# Patient Record
Sex: MALE | Race: ASIAN | NOT HISPANIC OR LATINO | Employment: OTHER | URBAN - METROPOLITAN AREA
[De-identification: names, ages, dates, MRNs, and addresses within clinical notes are randomized per-mention and may not be internally consistent; named-entity substitution may affect disease eponyms.]

---

## 2018-02-20 ENCOUNTER — AMBULATORY - HEALTHEAST (OUTPATIENT)
Dept: CARDIOLOGY | Facility: CLINIC | Age: 63
End: 2018-02-20

## 2018-02-22 ENCOUNTER — RECORDS - HEALTHEAST (OUTPATIENT)
Dept: ADMINISTRATIVE | Facility: OTHER | Age: 63
End: 2018-02-22

## 2018-03-15 ENCOUNTER — RECORDS - HEALTHEAST (OUTPATIENT)
Dept: ADMINISTRATIVE | Facility: OTHER | Age: 63
End: 2018-03-15

## 2018-03-21 ENCOUNTER — OFFICE VISIT - HEALTHEAST (OUTPATIENT)
Dept: CARDIOLOGY | Facility: CLINIC | Age: 63
End: 2018-03-21

## 2018-03-21 ENCOUNTER — AMBULATORY - HEALTHEAST (OUTPATIENT)
Dept: CARDIOLOGY | Facility: CLINIC | Age: 63
End: 2018-03-21

## 2018-03-21 DIAGNOSIS — I48.91 ATRIAL FIBRILLATION WITH RAPID VENTRICULAR RESPONSE (H): ICD-10-CM

## 2018-03-21 DIAGNOSIS — N17.9 AKI (ACUTE KIDNEY INJURY) (H): ICD-10-CM

## 2018-03-21 DIAGNOSIS — I50.20 SYSTOLIC CONGESTIVE HEART FAILURE, UNSPECIFIED CONGESTIVE HEART FAILURE CHRONICITY: ICD-10-CM

## 2018-03-21 DIAGNOSIS — N18.30 CHRONIC KIDNEY DISEASE (CKD), STAGE III (MODERATE) (H): ICD-10-CM

## 2018-03-21 ASSESSMENT — MIFFLIN-ST. JEOR: SCORE: 1387.28

## 2018-03-22 LAB
ATRIAL RATE - MUSE: 88 BPM
DIASTOLIC BLOOD PRESSURE - MUSE: NORMAL MMHG
INTERPRETATION ECG - MUSE: NORMAL
P AXIS - MUSE: 54 DEGREES
PR INTERVAL - MUSE: 166 MS
QRS DURATION - MUSE: 88 MS
QT - MUSE: 402 MS
QTC - MUSE: 486 MS
R AXIS - MUSE: -9 DEGREES
SYSTOLIC BLOOD PRESSURE - MUSE: NORMAL MMHG
T AXIS - MUSE: 37 DEGREES
VENTRICULAR RATE- MUSE: 88 BPM

## 2018-04-09 ENCOUNTER — HOSPITAL ENCOUNTER (OUTPATIENT)
Dept: CARDIOLOGY | Facility: HOSPITAL | Age: 63
Discharge: HOME | End: 2018-04-09

## 2018-04-09 DIAGNOSIS — I50.20 SYSTOLIC CONGESTIVE HEART FAILURE, UNSPECIFIED CONGESTIVE HEART FAILURE CHRONICITY: ICD-10-CM

## 2018-04-09 ASSESSMENT — MIFFLIN-ST. JEOR: SCORE: 1387.28

## 2018-04-10 LAB
BSA FOR ECHO PROCEDURE: 1.78 M2
CV BLOOD PRESSURE: NORMAL MMHG
CV ECHO HEIGHT: 63 IN
CV ECHO WEIGHT: 157 LBS
EJECTION FRACTION: 17 % (ref 55–75)
LEFT VENTRICLE DIASTOLIC VOLUME INDEX: 71.3 CM3/M2 (ref 34–74)
LEFT VENTRICLE DIASTOLIC VOLUME: 127 CM3 (ref 62–150)
LEFT VENTRICLE HEART RATE: 132 BPM
LEFT VENTRICLE SYSTOLIC VOLUME INDEX: 59.6 CM3/M2 (ref 11–31)
LEFT VENTRICLE SYSTOLIC VOLUME: 106 CM3 (ref 21–61)
NUC REST DIASTOLIC VOLUME INDEX: 2512 LBS
NUC REST SYSTOLIC VOLUME INDEX: 63 IN

## 2018-04-12 ENCOUNTER — COMMUNICATION - HEALTHEAST (OUTPATIENT)
Dept: CARDIOLOGY | Facility: CLINIC | Age: 63
End: 2018-04-12

## 2018-04-12 ENCOUNTER — OFFICE VISIT - HEALTHEAST (OUTPATIENT)
Dept: CARDIOLOGY | Facility: CLINIC | Age: 63
End: 2018-04-12

## 2018-04-12 DIAGNOSIS — I48.91 ATRIAL FIBRILLATION WITH RAPID VENTRICULAR RESPONSE (H): ICD-10-CM

## 2018-04-12 DIAGNOSIS — N28.9 KIDNEY DISEASE: ICD-10-CM

## 2018-04-12 DIAGNOSIS — I42.9 CARDIOMYOPATHY (H): ICD-10-CM

## 2018-04-12 LAB
ANION GAP SERPL CALCULATED.3IONS-SCNC: 8 MMOL/L (ref 5–18)
ATRIAL RATE - MUSE: 344 BPM
BUN SERPL-MCNC: 29 MG/DL (ref 8–22)
CALCIUM SERPL-MCNC: 9.5 MG/DL (ref 8.5–10.5)
CHLORIDE BLD-SCNC: 109 MMOL/L (ref 98–107)
CO2 SERPL-SCNC: 23 MMOL/L (ref 22–31)
CREAT SERPL-MCNC: 2.17 MG/DL (ref 0.7–1.3)
DIASTOLIC BLOOD PRESSURE - MUSE: NORMAL MMHG
ERYTHROCYTE [DISTWIDTH] IN BLOOD BY AUTOMATED COUNT: 13.9 % (ref 11–14.5)
GFR SERPL CREATININE-BSD FRML MDRD: 31 ML/MIN/1.73M2
GLUCOSE BLD-MCNC: 92 MG/DL (ref 70–125)
HCT VFR BLD AUTO: 48.4 % (ref 40–54)
HGB BLD-MCNC: 16 G/DL (ref 14–18)
INR PPP: 1.11 (ref 0.9–1.1)
INTERPRETATION ECG - MUSE: NORMAL
MCH RBC QN AUTO: 31.3 PG (ref 27–34)
MCHC RBC AUTO-ENTMCNC: 33.1 G/DL (ref 32–36)
MCV RBC AUTO: 95 FL (ref 80–100)
P AXIS - MUSE: NORMAL DEGREES
PLATELET # BLD AUTO: 216 THOU/UL (ref 140–440)
PMV BLD AUTO: 11.5 FL (ref 8.5–12.5)
POTASSIUM BLD-SCNC: 4.1 MMOL/L (ref 3.5–5)
PR INTERVAL - MUSE: NORMAL MS
QRS DURATION - MUSE: 94 MS
QT - MUSE: 354 MS
QTC - MUSE: 520 MS
R AXIS - MUSE: 5 DEGREES
RBC # BLD AUTO: 5.12 MILL/UL (ref 4.4–6.2)
SODIUM SERPL-SCNC: 140 MMOL/L (ref 136–145)
SYSTOLIC BLOOD PRESSURE - MUSE: NORMAL MMHG
T AXIS - MUSE: 33 DEGREES
TROPONIN I SERPL-MCNC: 0.08 NG/ML (ref 0–0.29)
VENTRICULAR RATE- MUSE: 130 BPM
WBC: 7.7 THOU/UL (ref 4–11)

## 2018-04-12 ASSESSMENT — MIFFLIN-ST. JEOR
SCORE: 1394.08
SCORE: 1372.42

## 2018-04-13 ASSESSMENT — MIFFLIN-ST. JEOR
SCORE: 1392.26

## 2018-04-14 ASSESSMENT — MIFFLIN-ST. JEOR
SCORE: 1372

## 2018-04-15 ASSESSMENT — MIFFLIN-ST. JEOR
SCORE: 1377

## 2018-04-16 ASSESSMENT — MIFFLIN-ST. JEOR
SCORE: 1349

## 2018-04-17 ENCOUNTER — ANESTHESIA - HEALTHEAST (OUTPATIENT)
Dept: INTENSIVE CARE | Facility: CLINIC | Age: 63
End: 2018-04-17

## 2018-04-17 ENCOUNTER — RECORDS - HEALTHEAST (OUTPATIENT)
Dept: INTENSIVE CARE | Facility: CLINIC | Age: 63
End: 2018-04-17

## 2018-04-17 ASSESSMENT — MIFFLIN-ST. JEOR
SCORE: 1343.27

## 2018-04-18 ENCOUNTER — SURGERY - HEALTHEAST (OUTPATIENT)
Dept: CARDIOLOGY | Facility: CLINIC | Age: 63
End: 2018-04-18

## 2018-04-18 ASSESSMENT — MIFFLIN-ST. JEOR
SCORE: 1345.09

## 2018-04-19 ASSESSMENT — MIFFLIN-ST. JEOR
SCORE: 1356.88

## 2018-04-20 ENCOUNTER — SURGERY - HEALTHEAST (OUTPATIENT)
Dept: CARDIOLOGY | Facility: CLINIC | Age: 63
End: 2018-04-20

## 2018-04-20 ENCOUNTER — AMBULATORY - HEALTHEAST (OUTPATIENT)
Dept: CARDIOLOGY | Facility: CLINIC | Age: 63
End: 2018-04-20

## 2018-04-20 ENCOUNTER — RECORDS - HEALTHEAST (OUTPATIENT)
Dept: ADMINISTRATIVE | Facility: OTHER | Age: 63
End: 2018-04-20

## 2018-04-20 ENCOUNTER — ANESTHESIA - HEALTHEAST (OUTPATIENT)
Dept: SURGERY | Facility: CLINIC | Age: 63
End: 2018-04-20

## 2018-04-20 ASSESSMENT — MIFFLIN-ST. JEOR
SCORE: 1365.05

## 2018-04-21 ASSESSMENT — MIFFLIN-ST. JEOR
SCORE: 1345.99

## 2018-04-22 ENCOUNTER — SURGERY - HEALTHEAST (OUTPATIENT)
Dept: SURGERY | Facility: CLINIC | Age: 63
End: 2018-04-22

## 2018-04-22 ASSESSMENT — MIFFLIN-ST. JEOR
SCORE: 1385.91

## 2018-04-23 ENCOUNTER — SURGERY - HEALTHEAST (OUTPATIENT)
Dept: CARDIOLOGY | Facility: CLINIC | Age: 63
End: 2018-04-23

## 2018-04-24 ENCOUNTER — HOME CARE/HOSPICE - HEALTHEAST (OUTPATIENT)
Dept: HOME HEALTH SERVICES | Facility: HOME HEALTH | Age: 63
End: 2018-04-24

## 2018-04-24 ASSESSMENT — MIFFLIN-ST. JEOR
SCORE: 1381.83

## 2018-04-25 ASSESSMENT — MIFFLIN-ST. JEOR
SCORE: 1383.19

## 2018-04-27 ENCOUNTER — HOME CARE/HOSPICE - HEALTHEAST (OUTPATIENT)
Dept: HOME HEALTH SERVICES | Facility: HOME HEALTH | Age: 63
End: 2018-04-27

## 2018-04-28 ENCOUNTER — HOME CARE/HOSPICE - HEALTHEAST (OUTPATIENT)
Dept: HOME HEALTH SERVICES | Facility: HOME HEALTH | Age: 63
End: 2018-04-28

## 2018-04-29 ENCOUNTER — HOME CARE/HOSPICE - HEALTHEAST (OUTPATIENT)
Dept: HOME HEALTH SERVICES | Facility: HOME HEALTH | Age: 63
End: 2018-04-29

## 2018-05-01 ENCOUNTER — RECORDS - HEALTHEAST (OUTPATIENT)
Dept: ADMINISTRATIVE | Facility: OTHER | Age: 63
End: 2018-05-01

## 2018-05-01 ENCOUNTER — HOME CARE/HOSPICE - HEALTHEAST (OUTPATIENT)
Dept: HOME HEALTH SERVICES | Facility: HOME HEALTH | Age: 63
End: 2018-05-01

## 2018-05-02 ENCOUNTER — HOME CARE/HOSPICE - HEALTHEAST (OUTPATIENT)
Dept: HOME HEALTH SERVICES | Facility: HOME HEALTH | Age: 63
End: 2018-05-02

## 2018-05-03 ENCOUNTER — HOME CARE/HOSPICE - HEALTHEAST (OUTPATIENT)
Dept: HOME HEALTH SERVICES | Facility: HOME HEALTH | Age: 63
End: 2018-05-03

## 2018-05-04 ENCOUNTER — HOME CARE/HOSPICE - HEALTHEAST (OUTPATIENT)
Dept: HOME HEALTH SERVICES | Facility: HOME HEALTH | Age: 63
End: 2018-05-04

## 2018-05-06 ENCOUNTER — HOME CARE/HOSPICE - HEALTHEAST (OUTPATIENT)
Dept: HOME HEALTH SERVICES | Facility: HOME HEALTH | Age: 63
End: 2018-05-06

## 2018-05-07 ENCOUNTER — HOME CARE/HOSPICE - HEALTHEAST (OUTPATIENT)
Dept: HOME HEALTH SERVICES | Facility: HOME HEALTH | Age: 63
End: 2018-05-07

## 2018-05-07 ASSESSMENT — MIFFLIN-ST. JEOR: SCORE: 1355.97

## 2018-05-08 ENCOUNTER — HOME CARE/HOSPICE - HEALTHEAST (OUTPATIENT)
Dept: HOME HEALTH SERVICES | Facility: HOME HEALTH | Age: 63
End: 2018-05-08

## 2018-05-10 ENCOUNTER — HOME CARE/HOSPICE - HEALTHEAST (OUTPATIENT)
Dept: HOME HEALTH SERVICES | Facility: HOME HEALTH | Age: 63
End: 2018-05-10

## 2018-05-10 ENCOUNTER — RECORDS - HEALTHEAST (OUTPATIENT)
Dept: ADMINISTRATIVE | Facility: OTHER | Age: 63
End: 2018-05-10

## 2018-05-10 ENCOUNTER — AMBULATORY - HEALTHEAST (OUTPATIENT)
Dept: CARDIOLOGY | Facility: CLINIC | Age: 63
End: 2018-05-10

## 2018-05-14 ENCOUNTER — HOME CARE/HOSPICE - HEALTHEAST (OUTPATIENT)
Dept: HOME HEALTH SERVICES | Facility: HOME HEALTH | Age: 63
End: 2018-05-14

## 2018-05-16 ENCOUNTER — HOME CARE/HOSPICE - HEALTHEAST (OUTPATIENT)
Dept: HOME HEALTH SERVICES | Facility: HOME HEALTH | Age: 63
End: 2018-05-16

## 2018-05-17 ENCOUNTER — HOME CARE/HOSPICE - HEALTHEAST (OUTPATIENT)
Dept: HOME HEALTH SERVICES | Facility: HOME HEALTH | Age: 63
End: 2018-05-17

## 2018-05-18 ENCOUNTER — HOME CARE/HOSPICE - HEALTHEAST (OUTPATIENT)
Dept: HOME HEALTH SERVICES | Facility: HOME HEALTH | Age: 63
End: 2018-05-18

## 2018-05-21 ENCOUNTER — HOME CARE/HOSPICE - HEALTHEAST (OUTPATIENT)
Dept: HOME HEALTH SERVICES | Facility: HOME HEALTH | Age: 63
End: 2018-05-21

## 2018-05-22 ENCOUNTER — OFFICE VISIT - HEALTHEAST (OUTPATIENT)
Dept: CARDIOLOGY | Facility: CLINIC | Age: 63
End: 2018-05-22

## 2018-05-22 DIAGNOSIS — I42.0 DILATED CARDIOMYOPATHY (H): ICD-10-CM

## 2018-05-22 DIAGNOSIS — I48.19 PERSISTENT ATRIAL FIBRILLATION (H): ICD-10-CM

## 2018-05-22 LAB
ANION GAP SERPL CALCULATED.3IONS-SCNC: 13 MMOL/L (ref 5–18)
BUN SERPL-MCNC: 41 MG/DL (ref 8–22)
CALCIUM SERPL-MCNC: 9.3 MG/DL (ref 8.5–10.5)
CHLORIDE BLD-SCNC: 103 MMOL/L (ref 98–107)
CO2 SERPL-SCNC: 25 MMOL/L (ref 22–31)
CREAT SERPL-MCNC: 2.09 MG/DL (ref 0.7–1.3)
GFR SERPL CREATININE-BSD FRML MDRD: 32 ML/MIN/1.73M2
GLUCOSE BLD-MCNC: 108 MG/DL (ref 70–125)
MAGNESIUM SERPL-MCNC: 1.7 MG/DL (ref 1.8–2.6)
POTASSIUM BLD-SCNC: 4 MMOL/L (ref 3.5–5)
SODIUM SERPL-SCNC: 141 MMOL/L (ref 136–145)

## 2018-05-22 ASSESSMENT — MIFFLIN-ST. JEOR: SCORE: 1318.81

## 2018-05-23 ENCOUNTER — HOME CARE/HOSPICE - HEALTHEAST (OUTPATIENT)
Dept: HOME HEALTH SERVICES | Facility: HOME HEALTH | Age: 63
End: 2018-05-23

## 2018-05-24 ENCOUNTER — HOME CARE/HOSPICE - HEALTHEAST (OUTPATIENT)
Dept: HOME HEALTH SERVICES | Facility: HOME HEALTH | Age: 63
End: 2018-05-24

## 2018-05-25 ENCOUNTER — AMBULATORY - HEALTHEAST (OUTPATIENT)
Dept: CARDIOLOGY | Facility: CLINIC | Age: 63
End: 2018-05-25

## 2018-05-25 DIAGNOSIS — Z95.810 ICD (IMPLANTABLE CARDIOVERTER-DEFIBRILLATOR) IN PLACE: ICD-10-CM

## 2018-05-25 ASSESSMENT — MIFFLIN-ST. JEOR: SCORE: 1316.97

## 2018-05-29 LAB
HCC DEVICE COMMENTS: NORMAL
HCC DEVICE IMPLANTING PROVIDER: NORMAL
HCC DEVICE MANUFACTURE: NORMAL
HCC DEVICE MODEL: NORMAL
HCC DEVICE SERIAL NUMBER: NORMAL
HCC DEVICE TYPE: NORMAL

## 2018-05-31 ENCOUNTER — HOME CARE/HOSPICE - HEALTHEAST (OUTPATIENT)
Dept: HOME HEALTH SERVICES | Facility: HOME HEALTH | Age: 63
End: 2018-05-31

## 2018-06-03 ENCOUNTER — HOME CARE/HOSPICE - HEALTHEAST (OUTPATIENT)
Dept: HOME HEALTH SERVICES | Facility: HOME HEALTH | Age: 63
End: 2018-06-03

## 2018-06-04 ENCOUNTER — HOME CARE/HOSPICE - HEALTHEAST (OUTPATIENT)
Dept: HOME HEALTH SERVICES | Facility: HOME HEALTH | Age: 63
End: 2018-06-04

## 2018-06-04 ENCOUNTER — COMMUNICATION - HEALTHEAST (OUTPATIENT)
Dept: CARDIOLOGY | Facility: CLINIC | Age: 63
End: 2018-06-04

## 2018-06-04 ENCOUNTER — AMBULATORY - HEALTHEAST (OUTPATIENT)
Dept: CARDIOLOGY | Facility: CLINIC | Age: 63
End: 2018-06-04

## 2018-06-04 DIAGNOSIS — Z95.810 ICD (IMPLANTABLE CARDIOVERTER-DEFIBRILLATOR) IN PLACE: ICD-10-CM

## 2018-06-09 ENCOUNTER — AMBULATORY - HEALTHEAST (OUTPATIENT)
Dept: CARDIOLOGY | Facility: CLINIC | Age: 63
End: 2018-06-09

## 2018-06-09 DIAGNOSIS — Z95.810 ICD (IMPLANTABLE CARDIOVERTER-DEFIBRILLATOR) IN PLACE: ICD-10-CM

## 2018-06-11 ENCOUNTER — HOME CARE/HOSPICE - HEALTHEAST (OUTPATIENT)
Dept: HOME HEALTH SERVICES | Facility: HOME HEALTH | Age: 63
End: 2018-06-11

## 2018-06-11 ENCOUNTER — COMMUNICATION - HEALTHEAST (OUTPATIENT)
Dept: CARDIOLOGY | Facility: CLINIC | Age: 63
End: 2018-06-11

## 2018-06-18 ENCOUNTER — HOME CARE/HOSPICE - HEALTHEAST (OUTPATIENT)
Dept: HOME HEALTH SERVICES | Facility: HOME HEALTH | Age: 63
End: 2018-06-18

## 2018-06-18 ENCOUNTER — AMBULATORY - HEALTHEAST (OUTPATIENT)
Dept: CARDIOLOGY | Facility: CLINIC | Age: 63
End: 2018-06-18

## 2018-06-18 DIAGNOSIS — Z95.810 ICD (IMPLANTABLE CARDIOVERTER-DEFIBRILLATOR) IN PLACE: ICD-10-CM

## 2018-06-20 ENCOUNTER — RECORDS - HEALTHEAST (OUTPATIENT)
Dept: ADMINISTRATIVE | Facility: OTHER | Age: 63
End: 2018-06-20

## 2018-06-21 ENCOUNTER — AMBULATORY - HEALTHEAST (OUTPATIENT)
Dept: CARDIOLOGY | Facility: CLINIC | Age: 63
End: 2018-06-21

## 2018-06-21 ENCOUNTER — OFFICE VISIT - HEALTHEAST (OUTPATIENT)
Dept: CARDIOLOGY | Facility: CLINIC | Age: 63
End: 2018-06-21

## 2018-06-21 DIAGNOSIS — N18.30 CKD (CHRONIC KIDNEY DISEASE) STAGE 3, GFR 30-59 ML/MIN (H): ICD-10-CM

## 2018-06-21 DIAGNOSIS — I25.10 CORONARY ARTERY DISEASE INVOLVING NATIVE CORONARY ARTERY OF NATIVE HEART WITHOUT ANGINA PECTORIS: ICD-10-CM

## 2018-06-21 DIAGNOSIS — Z95.810 ICD (IMPLANTABLE CARDIOVERTER-DEFIBRILLATOR) IN PLACE: ICD-10-CM

## 2018-06-21 DIAGNOSIS — I48.0 PAROXYSMAL ATRIAL FIBRILLATION (H): ICD-10-CM

## 2018-06-21 DIAGNOSIS — E78.5 DYSLIPIDEMIA, GOAL LDL BELOW 70: ICD-10-CM

## 2018-06-21 DIAGNOSIS — I50.22 CHRONIC SYSTOLIC HEART FAILURE (H): ICD-10-CM

## 2018-06-21 DIAGNOSIS — I10 ESSENTIAL HYPERTENSION: ICD-10-CM

## 2018-06-21 DIAGNOSIS — I25.5 ISCHEMIC CARDIOMYOPATHY: ICD-10-CM

## 2018-06-21 ASSESSMENT — MIFFLIN-ST. JEOR: SCORE: 1388.63

## 2018-06-25 ENCOUNTER — HOME CARE/HOSPICE - HEALTHEAST (OUTPATIENT)
Dept: HOME HEALTH SERVICES | Facility: HOME HEALTH | Age: 63
End: 2018-06-25

## 2018-06-26 ENCOUNTER — COMMUNICATION - HEALTHEAST (OUTPATIENT)
Dept: CARDIOLOGY | Facility: CLINIC | Age: 63
End: 2018-06-26

## 2018-06-28 ENCOUNTER — HOME CARE/HOSPICE - HEALTHEAST (OUTPATIENT)
Dept: HOME HEALTH SERVICES | Facility: HOME HEALTH | Age: 63
End: 2018-06-28

## 2018-06-30 ENCOUNTER — AMBULATORY - HEALTHEAST (OUTPATIENT)
Dept: CARDIOLOGY | Facility: CLINIC | Age: 63
End: 2018-06-30

## 2018-06-30 DIAGNOSIS — Z95.810 ICD (IMPLANTABLE CARDIOVERTER-DEFIBRILLATOR) IN PLACE: ICD-10-CM

## 2018-07-02 ENCOUNTER — HOME CARE/HOSPICE - HEALTHEAST (OUTPATIENT)
Dept: HOME HEALTH SERVICES | Facility: HOME HEALTH | Age: 63
End: 2018-07-02

## 2018-07-03 ENCOUNTER — COMMUNICATION - HEALTHEAST (OUTPATIENT)
Dept: OTHER | Facility: CLINIC | Age: 63
End: 2018-07-03

## 2018-07-03 ENCOUNTER — HOME CARE/HOSPICE - HEALTHEAST (OUTPATIENT)
Dept: HOME HEALTH SERVICES | Facility: HOME HEALTH | Age: 63
End: 2018-07-03

## 2018-07-05 ENCOUNTER — HOME CARE/HOSPICE - HEALTHEAST (OUTPATIENT)
Dept: HOME HEALTH SERVICES | Facility: HOME HEALTH | Age: 63
End: 2018-07-05

## 2018-07-10 ENCOUNTER — OFFICE VISIT - HEALTHEAST (OUTPATIENT)
Dept: CARDIOLOGY | Facility: CLINIC | Age: 63
End: 2018-07-10

## 2018-07-10 DIAGNOSIS — I48.19 PERSISTENT ATRIAL FIBRILLATION (H): ICD-10-CM

## 2018-07-10 DIAGNOSIS — I25.10 CAD (CORONARY ARTERY DISEASE): ICD-10-CM

## 2018-07-10 DIAGNOSIS — Z95.5 S/P CORONARY ARTERY STENT PLACEMENT: ICD-10-CM

## 2018-07-10 DIAGNOSIS — I42.0 DILATED CARDIOMYOPATHY (H): ICD-10-CM

## 2018-07-10 ASSESSMENT — MIFFLIN-ST. JEOR: SCORE: 1380.47

## 2018-07-11 ENCOUNTER — HOSPITAL ENCOUNTER (OUTPATIENT)
Dept: CARDIOLOGY | Facility: HOSPITAL | Age: 63
Discharge: HOME | End: 2018-07-11
Attending: INTERNAL MEDICINE

## 2018-07-11 DIAGNOSIS — I25.5 ISCHEMIC CARDIOMYOPATHY: ICD-10-CM

## 2018-07-11 ASSESSMENT — MIFFLIN-ST. JEOR: SCORE: 1380.47

## 2018-07-12 LAB
BSA FOR ECHO PROCEDURE: 1.76 M2
CV BLOOD PRESSURE: NORMAL MMHG
CV ECHO HEIGHT: 64 IN
CV ECHO WEIGHT: 152 LBS
DOP CALC LVOT AREA: 3.14 CM2
DOP CALC LVOT DIAMETER: 2 CM
DOP CALC LVOT PEAK VEL: 96.8 CM/S
DOP CALC LVOT STROKE VOLUME: 49.6 CM3
DOP CALCLVOT PEAK VEL VTI: 15.8 CM
FRACTIONAL SHORTENING: 23 % (ref 28–44)
INTERVENTRICULAR SEPTUM IN END DIASTOLE: 1.26 CM (ref 0.6–1)
IVS/PW RATIO: 1
LA AREA 1: 18.5 CM2
LA AREA 2: 15.5 CM2
LEFT ATRIUM LENGTH: 5.5 CM
LEFT ATRIUM VOLUME INDEX: 25.2 ML/M2
LEFT ATRIUM VOLUME: 44.3 ML
LEFT VENTRICLE MASS INDEX: 157.1 G/M2
LEFT VENTRICULAR INTERNAL DIMENSION IN DIASTOLE: 5.25 CM (ref 4.2–5.8)
LEFT VENTRICULAR INTERNAL DIMENSION IN SYSTOLE: 4.04 CM (ref 2.5–4)
LEFT VENTRICULAR MASS: 276.6 G
LEFT VENTRICULAR OUTFLOW TRACT MEAN GRADIENT: 2 MMHG
LEFT VENTRICULAR OUTFLOW TRACT MEAN VELOCITY: 70.3 CM/S
LEFT VENTRICULAR OUTFLOW TRACT PEAK GRADIENT: 4 MMHG
LEFT VENTRICULAR POSTERIOR WALL IN END DIASTOLE: 1.3 CM (ref 0.6–1)
LV STROKE VOLUME INDEX: 28.2 ML/M2
MITRAL VALVE E/A RATIO: 2.8
MV AVERAGE E/E' RATIO: 10.5 CM/S
MV DECELERATION TIME: 215 MS
MV E'TISSUE VEL-LAT: 9.96 CM/S
MV E'TISSUE VEL-MED: 7.74 CM/S
MV LATERAL E/E' RATIO: 9.3
MV MEDIAL E/E' RATIO: 12
MV PEAK A VELOCITY: 32.5 CM/S
MV PEAK E VELOCITY: 92.6 CM/S
NUC REST DIASTOLIC VOLUME INDEX: 2432 LBS
NUC REST SYSTOLIC VOLUME INDEX: 64 IN
TRICUSPID REGURGITATION PEAK PRESSURE GRADIENT: 23.8 MMHG
TRICUSPID VALVE ANULAR PLANE SYSTOLIC EXCURSION: 1.8 CM
TRICUSPID VALVE PEAK REGURGITANT VELOCITY: 244 CM/S

## 2018-07-16 ENCOUNTER — HOME CARE/HOSPICE - HEALTHEAST (OUTPATIENT)
Dept: HOME HEALTH SERVICES | Facility: HOME HEALTH | Age: 63
End: 2018-07-16

## 2018-07-19 ENCOUNTER — HOME CARE/HOSPICE - HEALTHEAST (OUTPATIENT)
Dept: HOME HEALTH SERVICES | Facility: HOME HEALTH | Age: 63
End: 2018-07-19

## 2018-07-24 ENCOUNTER — HOME CARE/HOSPICE - HEALTHEAST (OUTPATIENT)
Dept: HOME HEALTH SERVICES | Facility: HOME HEALTH | Age: 63
End: 2018-07-24

## 2018-07-30 ENCOUNTER — HOME CARE/HOSPICE - HEALTHEAST (OUTPATIENT)
Dept: HOME HEALTH SERVICES | Facility: HOME HEALTH | Age: 63
End: 2018-07-30

## 2018-08-13 ENCOUNTER — HOME CARE/HOSPICE - HEALTHEAST (OUTPATIENT)
Dept: HOME HEALTH SERVICES | Facility: HOME HEALTH | Age: 63
End: 2018-08-13

## 2018-08-14 ENCOUNTER — HOME CARE/HOSPICE - HEALTHEAST (OUTPATIENT)
Dept: HOME HEALTH SERVICES | Facility: HOME HEALTH | Age: 63
End: 2018-08-14

## 2018-08-16 ENCOUNTER — AMBULATORY - HEALTHEAST (OUTPATIENT)
Dept: CARDIOLOGY | Facility: CLINIC | Age: 63
End: 2018-08-16

## 2018-08-16 ENCOUNTER — RECORDS - HEALTHEAST (OUTPATIENT)
Dept: ADMINISTRATIVE | Facility: OTHER | Age: 63
End: 2018-08-16

## 2018-08-17 ENCOUNTER — OFFICE VISIT - HEALTHEAST (OUTPATIENT)
Dept: CARDIOLOGY | Facility: CLINIC | Age: 63
End: 2018-08-17

## 2018-08-17 ENCOUNTER — AMBULATORY - HEALTHEAST (OUTPATIENT)
Dept: CARDIOLOGY | Facility: CLINIC | Age: 63
End: 2018-08-17

## 2018-08-17 DIAGNOSIS — I50.20 SYSTOLIC CONGESTIVE HEART FAILURE, UNSPECIFIED CONGESTIVE HEART FAILURE CHRONICITY: ICD-10-CM

## 2018-08-17 DIAGNOSIS — Z95.810 ICD (IMPLANTABLE CARDIOVERTER-DEFIBRILLATOR) IN PLACE: ICD-10-CM

## 2018-08-17 DIAGNOSIS — I48.19 PERSISTENT ATRIAL FIBRILLATION (H): ICD-10-CM

## 2018-08-17 DIAGNOSIS — I47.20 VENTRICULAR TACHYCARDIA (H): ICD-10-CM

## 2018-08-17 DIAGNOSIS — I10 ESSENTIAL HYPERTENSION: ICD-10-CM

## 2018-08-17 DIAGNOSIS — I25.10 CAD (CORONARY ARTERY DISEASE): ICD-10-CM

## 2018-08-17 DIAGNOSIS — Z95.5 S/P CORONARY ARTERY STENT PLACEMENT: ICD-10-CM

## 2018-08-17 LAB
ANION GAP SERPL CALCULATED.3IONS-SCNC: 7 MMOL/L (ref 5–18)
BUN SERPL-MCNC: 32 MG/DL (ref 8–22)
CALCIUM SERPL-MCNC: 9 MG/DL (ref 8.5–10.5)
CHLORIDE BLD-SCNC: 111 MMOL/L (ref 98–107)
CHOLEST SERPL-MCNC: 156 MG/DL
CO2 SERPL-SCNC: 25 MMOL/L (ref 22–31)
CREAT SERPL-MCNC: 1.78 MG/DL (ref 0.7–1.3)
FASTING STATUS PATIENT QL REPORTED: ABNORMAL
GFR SERPL CREATININE-BSD FRML MDRD: 39 ML/MIN/1.73M2
GLUCOSE BLD-MCNC: 77 MG/DL (ref 70–125)
HCC DEVICE COMMENTS: NORMAL
HCC DEVICE IMPLANTING PROVIDER: NORMAL
HCC DEVICE MANUFACTURE: NORMAL
HCC DEVICE MODEL: NORMAL
HCC DEVICE SERIAL NUMBER: NORMAL
HCC DEVICE TYPE: NORMAL
HDLC SERPL-MCNC: 43 MG/DL
LDLC SERPL CALC-MCNC: 73 MG/DL
MAGNESIUM SERPL-MCNC: 2.2 MG/DL (ref 1.8–2.6)
POTASSIUM BLD-SCNC: 4.4 MMOL/L (ref 3.5–5)
SODIUM SERPL-SCNC: 143 MMOL/L (ref 136–145)
TRIGL SERPL-MCNC: 201 MG/DL
TSH SERPL DL<=0.005 MIU/L-ACNC: 0.39 UIU/ML (ref 0.3–5)

## 2018-08-17 ASSESSMENT — MIFFLIN-ST. JEOR: SCORE: 1443.97

## 2018-08-20 ENCOUNTER — COMMUNICATION - HEALTHEAST (OUTPATIENT)
Dept: CARDIOLOGY | Facility: CLINIC | Age: 63
End: 2018-08-20

## 2018-08-20 LAB
ATRIAL RATE - MUSE: 70 BPM
DIASTOLIC BLOOD PRESSURE - MUSE: NORMAL MMHG
INTERPRETATION ECG - MUSE: NORMAL
P AXIS - MUSE: -19 DEGREES
PR INTERVAL - MUSE: 122 MS
QRS DURATION - MUSE: 94 MS
QT - MUSE: 436 MS
QTC - MUSE: 470 MS
R AXIS - MUSE: -16 DEGREES
SYSTOLIC BLOOD PRESSURE - MUSE: NORMAL MMHG
T AXIS - MUSE: 5 DEGREES
VENTRICULAR RATE- MUSE: 70 BPM

## 2018-08-23 ENCOUNTER — HOME CARE/HOSPICE - HEALTHEAST (OUTPATIENT)
Dept: HOME HEALTH SERVICES | Facility: HOME HEALTH | Age: 63
End: 2018-08-23

## 2018-08-23 ENCOUNTER — COMMUNICATION - HEALTHEAST (OUTPATIENT)
Dept: HOME HEALTH SERVICES | Facility: HOME HEALTH | Age: 63
End: 2018-08-23

## 2018-08-23 ENCOUNTER — RECORDS - HEALTHEAST (OUTPATIENT)
Dept: ADMINISTRATIVE | Facility: OTHER | Age: 63
End: 2018-08-23

## 2018-08-23 DIAGNOSIS — I25.10 CAD (CORONARY ARTERY DISEASE): ICD-10-CM

## 2018-08-23 RX ORDER — LEVOTHYROXINE SODIUM 88 UG/1
88 TABLET ORAL DAILY
Status: SHIPPED | COMMUNITY
Start: 2018-08-23

## 2018-08-24 ENCOUNTER — HOME CARE/HOSPICE - HEALTHEAST (OUTPATIENT)
Dept: HOME HEALTH SERVICES | Facility: HOME HEALTH | Age: 63
End: 2018-08-24

## 2018-08-24 RX ORDER — NITROGLYCERIN 0.4 MG/1
0.4 TABLET SUBLINGUAL EVERY 5 MIN PRN
Qty: 1 BOTTLE | Refills: 3 | Status: SHIPPED | OUTPATIENT
Start: 2018-08-24 | End: 2021-12-21

## 2018-08-27 ENCOUNTER — HOME CARE/HOSPICE - HEALTHEAST (OUTPATIENT)
Dept: HOME HEALTH SERVICES | Facility: HOME HEALTH | Age: 63
End: 2018-08-27

## 2018-08-28 ENCOUNTER — HOME CARE/HOSPICE - HEALTHEAST (OUTPATIENT)
Dept: HOME HEALTH SERVICES | Facility: HOME HEALTH | Age: 63
End: 2018-08-28

## 2018-08-29 ENCOUNTER — AMBULATORY - HEALTHEAST (OUTPATIENT)
Dept: CARDIOLOGY | Facility: CLINIC | Age: 63
End: 2018-08-29

## 2018-08-29 DIAGNOSIS — Z95.810 ICD (IMPLANTABLE CARDIOVERTER-DEFIBRILLATOR) IN PLACE: ICD-10-CM

## 2018-08-30 ENCOUNTER — HOSPITAL ENCOUNTER (OUTPATIENT)
Dept: ULTRASOUND IMAGING | Facility: HOSPITAL | Age: 63
Discharge: HOME | End: 2018-08-30
Attending: INTERNAL MEDICINE

## 2018-08-30 DIAGNOSIS — I10 ESSENTIAL (PRIMARY) HYPERTENSION: ICD-10-CM

## 2018-08-30 DIAGNOSIS — N18.30 CHRONIC KIDNEY DISEASE, STAGE III (MODERATE) (H): ICD-10-CM

## 2018-08-30 DIAGNOSIS — I48.20 CHRONIC A-FIB (H): ICD-10-CM

## 2018-09-10 ENCOUNTER — HOME CARE/HOSPICE - HEALTHEAST (OUTPATIENT)
Dept: HOME HEALTH SERVICES | Facility: HOME HEALTH | Age: 63
End: 2018-09-10

## 2018-09-23 ENCOUNTER — AMBULATORY - HEALTHEAST (OUTPATIENT)
Dept: CARDIOLOGY | Facility: CLINIC | Age: 63
End: 2018-09-23

## 2018-09-23 DIAGNOSIS — Z95.810 ICD (IMPLANTABLE CARDIOVERTER-DEFIBRILLATOR) IN PLACE: ICD-10-CM

## 2018-09-24 ENCOUNTER — COMMUNICATION - HEALTHEAST (OUTPATIENT)
Dept: CARDIOLOGY | Facility: CLINIC | Age: 63
End: 2018-09-24

## 2018-09-24 ENCOUNTER — HOME CARE/HOSPICE - HEALTHEAST (OUTPATIENT)
Dept: HOME HEALTH SERVICES | Facility: HOME HEALTH | Age: 63
End: 2018-09-24

## 2018-09-25 ENCOUNTER — HOME CARE/HOSPICE - HEALTHEAST (OUTPATIENT)
Dept: HOME HEALTH SERVICES | Facility: HOME HEALTH | Age: 63
End: 2018-09-25

## 2018-09-27 ENCOUNTER — HOME CARE/HOSPICE - HEALTHEAST (OUTPATIENT)
Dept: HOME HEALTH SERVICES | Facility: HOME HEALTH | Age: 63
End: 2018-09-27

## 2018-09-30 ENCOUNTER — AMBULATORY - HEALTHEAST (OUTPATIENT)
Dept: CARDIOLOGY | Facility: CLINIC | Age: 63
End: 2018-09-30

## 2018-09-30 DIAGNOSIS — Z95.810 ICD (IMPLANTABLE CARDIOVERTER-DEFIBRILLATOR) IN PLACE: ICD-10-CM

## 2018-10-01 ENCOUNTER — AMBULATORY - HEALTHEAST (OUTPATIENT)
Dept: CARDIOLOGY | Facility: CLINIC | Age: 63
End: 2018-10-01

## 2018-10-04 ENCOUNTER — AMBULATORY - HEALTHEAST (OUTPATIENT)
Dept: CARDIOLOGY | Facility: CLINIC | Age: 63
End: 2018-10-04

## 2018-10-04 DIAGNOSIS — I48.19 PERSISTENT ATRIAL FIBRILLATION (H): ICD-10-CM

## 2018-10-04 DIAGNOSIS — I25.10 CAD (CORONARY ARTERY DISEASE): ICD-10-CM

## 2018-10-04 DIAGNOSIS — I42.0 DILATED CARDIOMYOPATHY (H): ICD-10-CM

## 2018-10-04 DIAGNOSIS — Z95.810 ICD (IMPLANTABLE CARDIOVERTER-DEFIBRILLATOR) IN PLACE: ICD-10-CM

## 2018-10-04 DIAGNOSIS — I50.22 CHRONIC SYSTOLIC HEART FAILURE (H): ICD-10-CM

## 2018-10-04 DIAGNOSIS — I47.20 VENTRICULAR TACHYCARDIA (H): ICD-10-CM

## 2018-10-04 ASSESSMENT — MIFFLIN-ST. JEOR: SCORE: 1425.83

## 2018-10-08 ENCOUNTER — HOME CARE/HOSPICE - HEALTHEAST (OUTPATIENT)
Dept: HOME HEALTH SERVICES | Facility: HOME HEALTH | Age: 63
End: 2018-10-08

## 2018-10-10 ENCOUNTER — AMBULATORY - HEALTHEAST (OUTPATIENT)
Dept: CARDIOLOGY | Facility: CLINIC | Age: 63
End: 2018-10-10

## 2018-10-10 DIAGNOSIS — Z95.810 ICD (IMPLANTABLE CARDIOVERTER-DEFIBRILLATOR) IN PLACE: ICD-10-CM

## 2018-10-15 ENCOUNTER — AMBULATORY - HEALTHEAST (OUTPATIENT)
Dept: CARDIOLOGY | Facility: CLINIC | Age: 63
End: 2018-10-15

## 2018-10-15 DIAGNOSIS — Z95.810 ICD (IMPLANTABLE CARDIOVERTER-DEFIBRILLATOR) IN PLACE: ICD-10-CM

## 2018-10-18 ENCOUNTER — OFFICE VISIT - HEALTHEAST (OUTPATIENT)
Dept: CARDIOLOGY | Facility: CLINIC | Age: 63
End: 2018-10-18

## 2018-10-18 DIAGNOSIS — I50.22 CHRONIC SYSTOLIC HEART FAILURE (H): ICD-10-CM

## 2018-10-18 DIAGNOSIS — E78.5 DYSLIPIDEMIA, GOAL LDL BELOW 70: ICD-10-CM

## 2018-10-18 DIAGNOSIS — Z95.810 ICD (IMPLANTABLE CARDIOVERTER-DEFIBRILLATOR) IN PLACE: ICD-10-CM

## 2018-10-18 DIAGNOSIS — I42.0 DILATED CARDIOMYOPATHY (H): ICD-10-CM

## 2018-10-18 DIAGNOSIS — I25.10 CORONARY ARTERY DISEASE INVOLVING NATIVE CORONARY ARTERY OF NATIVE HEART WITHOUT ANGINA PECTORIS: ICD-10-CM

## 2018-10-18 ASSESSMENT — MIFFLIN-ST. JEOR: SCORE: 1412.22

## 2018-10-22 ENCOUNTER — HOME CARE/HOSPICE - HEALTHEAST (OUTPATIENT)
Dept: HOME HEALTH SERVICES | Facility: HOME HEALTH | Age: 63
End: 2018-10-22

## 2018-10-31 ENCOUNTER — AMBULATORY - HEALTHEAST (OUTPATIENT)
Dept: CARDIOLOGY | Facility: CLINIC | Age: 63
End: 2018-10-31

## 2018-10-31 ENCOUNTER — COMMUNICATION - HEALTHEAST (OUTPATIENT)
Dept: CARDIOLOGY | Facility: CLINIC | Age: 63
End: 2018-10-31

## 2018-10-31 DIAGNOSIS — Z95.810 ICD (IMPLANTABLE CARDIOVERTER-DEFIBRILLATOR) IN PLACE: ICD-10-CM

## 2018-11-05 ENCOUNTER — HOME CARE/HOSPICE - HEALTHEAST (OUTPATIENT)
Dept: HOME HEALTH SERVICES | Facility: HOME HEALTH | Age: 63
End: 2018-11-05

## 2018-11-06 ENCOUNTER — HOME CARE/HOSPICE - HEALTHEAST (OUTPATIENT)
Dept: HOME HEALTH SERVICES | Facility: HOME HEALTH | Age: 63
End: 2018-11-06

## 2018-11-07 ENCOUNTER — COMMUNICATION - HEALTHEAST (OUTPATIENT)
Dept: CARDIOLOGY | Facility: CLINIC | Age: 63
End: 2018-11-07

## 2018-11-07 ENCOUNTER — HOME CARE/HOSPICE - HEALTHEAST (OUTPATIENT)
Dept: HOME HEALTH SERVICES | Facility: HOME HEALTH | Age: 63
End: 2018-11-07

## 2018-11-09 ENCOUNTER — HOME CARE/HOSPICE - HEALTHEAST (OUTPATIENT)
Dept: HOME HEALTH SERVICES | Facility: HOME HEALTH | Age: 63
End: 2018-11-09

## 2018-11-19 ENCOUNTER — HOME CARE/HOSPICE - HEALTHEAST (OUTPATIENT)
Dept: HOME HEALTH SERVICES | Facility: HOME HEALTH | Age: 63
End: 2018-11-19

## 2018-11-20 ENCOUNTER — RECORDS - HEALTHEAST (OUTPATIENT)
Dept: ADMINISTRATIVE | Facility: OTHER | Age: 63
End: 2018-11-20

## 2018-11-20 ENCOUNTER — AMBULATORY - HEALTHEAST (OUTPATIENT)
Dept: CARDIOLOGY | Facility: CLINIC | Age: 63
End: 2018-11-20

## 2018-11-21 ENCOUNTER — OFFICE VISIT - HEALTHEAST (OUTPATIENT)
Dept: CARDIOLOGY | Facility: CLINIC | Age: 63
End: 2018-11-21

## 2018-11-21 DIAGNOSIS — N18.30 CHRONIC KIDNEY DISEASE (CKD), STAGE III (MODERATE) (H): ICD-10-CM

## 2018-11-21 DIAGNOSIS — I48.19 PERSISTENT ATRIAL FIBRILLATION (H): ICD-10-CM

## 2018-11-21 DIAGNOSIS — I42.0 DILATED CARDIOMYOPATHY (H): ICD-10-CM

## 2018-11-21 DIAGNOSIS — I50.22 CHRONIC SYSTOLIC HEART FAILURE (H): ICD-10-CM

## 2018-11-21 ASSESSMENT — MIFFLIN-ST. JEOR: SCORE: 1425.83

## 2018-12-03 ENCOUNTER — HOME CARE/HOSPICE - HEALTHEAST (OUTPATIENT)
Dept: HOME HEALTH SERVICES | Facility: HOME HEALTH | Age: 63
End: 2018-12-03

## 2018-12-06 ENCOUNTER — AMBULATORY - HEALTHEAST (OUTPATIENT)
Dept: CARDIOLOGY | Facility: CLINIC | Age: 63
End: 2018-12-06

## 2018-12-06 DIAGNOSIS — I49.01 VF (VENTRICULAR FIBRILLATION) (H): ICD-10-CM

## 2018-12-06 DIAGNOSIS — N18.30 CHRONIC KIDNEY DISEASE (CKD), STAGE III (MODERATE) (H): ICD-10-CM

## 2018-12-06 DIAGNOSIS — I48.19 PERSISTENT ATRIAL FIBRILLATION (H): ICD-10-CM

## 2018-12-06 DIAGNOSIS — I42.0 DILATED CARDIOMYOPATHY (H): ICD-10-CM

## 2018-12-06 DIAGNOSIS — Z95.810 ICD (IMPLANTABLE CARDIOVERTER-DEFIBRILLATOR) IN PLACE: ICD-10-CM

## 2018-12-06 ASSESSMENT — MIFFLIN-ST. JEOR: SCORE: 1425.83

## 2018-12-17 ENCOUNTER — HOME CARE/HOSPICE - HEALTHEAST (OUTPATIENT)
Dept: HOME HEALTH SERVICES | Facility: HOME HEALTH | Age: 63
End: 2018-12-17

## 2018-12-20 ENCOUNTER — HOME CARE/HOSPICE - HEALTHEAST (OUTPATIENT)
Dept: HOME HEALTH SERVICES | Facility: HOME HEALTH | Age: 63
End: 2018-12-20

## 2018-12-31 ENCOUNTER — HOME CARE/HOSPICE - HEALTHEAST (OUTPATIENT)
Dept: HOME HEALTH SERVICES | Facility: HOME HEALTH | Age: 63
End: 2018-12-31

## 2019-01-14 ENCOUNTER — HOME CARE/HOSPICE - HEALTHEAST (OUTPATIENT)
Dept: HOME HEALTH SERVICES | Facility: HOME HEALTH | Age: 64
End: 2019-01-14

## 2019-01-16 ENCOUNTER — OFFICE VISIT - HEALTHEAST (OUTPATIENT)
Dept: CARDIOLOGY | Facility: CLINIC | Age: 64
End: 2019-01-16

## 2019-01-16 DIAGNOSIS — N18.30 CHRONIC KIDNEY DISEASE (CKD), STAGE III (MODERATE) (H): ICD-10-CM

## 2019-01-16 DIAGNOSIS — I48.19 PERSISTENT ATRIAL FIBRILLATION (H): ICD-10-CM

## 2019-01-16 DIAGNOSIS — I50.22 CHRONIC SYSTOLIC HEART FAILURE (H): ICD-10-CM

## 2019-01-16 ASSESSMENT — MIFFLIN-ST. JEOR: SCORE: 1407.69

## 2019-01-17 ENCOUNTER — AMBULATORY - HEALTHEAST (OUTPATIENT)
Dept: CARDIOLOGY | Facility: CLINIC | Age: 64
End: 2019-01-17

## 2019-01-22 ENCOUNTER — HOME CARE/HOSPICE - HEALTHEAST (OUTPATIENT)
Dept: HOME HEALTH SERVICES | Facility: HOME HEALTH | Age: 64
End: 2019-01-22

## 2019-01-28 ENCOUNTER — HOME CARE/HOSPICE - HEALTHEAST (OUTPATIENT)
Dept: HOME HEALTH SERVICES | Facility: HOME HEALTH | Age: 64
End: 2019-01-28

## 2019-02-04 ENCOUNTER — HOME CARE/HOSPICE - HEALTHEAST (OUTPATIENT)
Dept: HOME HEALTH SERVICES | Facility: HOME HEALTH | Age: 64
End: 2019-02-04

## 2019-02-05 ENCOUNTER — HOME CARE/HOSPICE - HEALTHEAST (OUTPATIENT)
Dept: HOME HEALTH SERVICES | Facility: HOME HEALTH | Age: 64
End: 2019-02-05

## 2019-02-11 ENCOUNTER — HOME CARE/HOSPICE - HEALTHEAST (OUTPATIENT)
Dept: HOME HEALTH SERVICES | Facility: HOME HEALTH | Age: 64
End: 2019-02-11

## 2019-02-19 ENCOUNTER — HOME CARE/HOSPICE - HEALTHEAST (OUTPATIENT)
Dept: HOME HEALTH SERVICES | Facility: HOME HEALTH | Age: 64
End: 2019-02-19

## 2019-02-25 ENCOUNTER — HOME CARE/HOSPICE - HEALTHEAST (OUTPATIENT)
Dept: HOME HEALTH SERVICES | Facility: HOME HEALTH | Age: 64
End: 2019-02-25

## 2019-03-07 ENCOUNTER — HOME CARE/HOSPICE - HEALTHEAST (OUTPATIENT)
Dept: HOME HEALTH SERVICES | Facility: HOME HEALTH | Age: 64
End: 2019-03-07

## 2019-03-11 ENCOUNTER — HOME CARE/HOSPICE - HEALTHEAST (OUTPATIENT)
Dept: HOME HEALTH SERVICES | Facility: HOME HEALTH | Age: 64
End: 2019-03-11

## 2019-03-14 ENCOUNTER — HOME CARE/HOSPICE - HEALTHEAST (OUTPATIENT)
Dept: HOME HEALTH SERVICES | Facility: HOME HEALTH | Age: 64
End: 2019-03-14

## 2019-03-15 ENCOUNTER — HOME CARE/HOSPICE - HEALTHEAST (OUTPATIENT)
Dept: HOME HEALTH SERVICES | Facility: HOME HEALTH | Age: 64
End: 2019-03-15

## 2019-03-15 ENCOUNTER — AMBULATORY - HEALTHEAST (OUTPATIENT)
Dept: CARDIOLOGY | Facility: CLINIC | Age: 64
End: 2019-03-15

## 2019-03-15 ENCOUNTER — COMMUNICATION - HEALTHEAST (OUTPATIENT)
Dept: CARDIOLOGY | Facility: CLINIC | Age: 64
End: 2019-03-15

## 2019-03-15 DIAGNOSIS — Z95.810 ICD (IMPLANTABLE CARDIOVERTER-DEFIBRILLATOR) IN PLACE: ICD-10-CM

## 2019-03-15 RX ORDER — ALLOPURINOL 300 MG/1
300 TABLET ORAL DAILY
Status: SHIPPED | COMMUNITY
Start: 2019-03-15

## 2019-03-18 ENCOUNTER — HOME CARE/HOSPICE - HEALTHEAST (OUTPATIENT)
Dept: HOME HEALTH SERVICES | Facility: HOME HEALTH | Age: 64
End: 2019-03-18

## 2019-03-20 ENCOUNTER — HOME CARE/HOSPICE - HEALTHEAST (OUTPATIENT)
Dept: HOME HEALTH SERVICES | Facility: HOME HEALTH | Age: 64
End: 2019-03-20

## 2019-03-21 ENCOUNTER — HOME CARE/HOSPICE - HEALTHEAST (OUTPATIENT)
Dept: HOME HEALTH SERVICES | Facility: HOME HEALTH | Age: 64
End: 2019-03-21

## 2019-03-25 ENCOUNTER — HOME CARE/HOSPICE - HEALTHEAST (OUTPATIENT)
Dept: HOME HEALTH SERVICES | Facility: HOME HEALTH | Age: 64
End: 2019-03-25

## 2019-03-26 ENCOUNTER — HOME CARE/HOSPICE - HEALTHEAST (OUTPATIENT)
Dept: HOME HEALTH SERVICES | Facility: HOME HEALTH | Age: 64
End: 2019-03-26

## 2019-03-28 ENCOUNTER — HOME CARE/HOSPICE - HEALTHEAST (OUTPATIENT)
Dept: HOME HEALTH SERVICES | Facility: HOME HEALTH | Age: 64
End: 2019-03-28

## 2019-04-01 ENCOUNTER — HOME CARE/HOSPICE - HEALTHEAST (OUTPATIENT)
Dept: HOME HEALTH SERVICES | Facility: HOME HEALTH | Age: 64
End: 2019-04-01

## 2019-04-08 ENCOUNTER — HOME CARE/HOSPICE - HEALTHEAST (OUTPATIENT)
Dept: HOME HEALTH SERVICES | Facility: HOME HEALTH | Age: 64
End: 2019-04-08

## 2019-04-19 ENCOUNTER — HOME CARE/HOSPICE - HEALTHEAST (OUTPATIENT)
Dept: HOME HEALTH SERVICES | Facility: HOME HEALTH | Age: 64
End: 2019-04-19

## 2019-04-21 ENCOUNTER — HOME CARE/HOSPICE - HEALTHEAST (OUTPATIENT)
Dept: HOME HEALTH SERVICES | Facility: HOME HEALTH | Age: 64
End: 2019-04-21

## 2019-04-23 ENCOUNTER — HOME CARE/HOSPICE - HEALTHEAST (OUTPATIENT)
Dept: HOME HEALTH SERVICES | Facility: HOME HEALTH | Age: 64
End: 2019-04-23

## 2019-04-30 ENCOUNTER — HOME CARE/HOSPICE - HEALTHEAST (OUTPATIENT)
Dept: HOME HEALTH SERVICES | Facility: HOME HEALTH | Age: 64
End: 2019-04-30

## 2019-05-03 ENCOUNTER — HOME CARE/HOSPICE - HEALTHEAST (OUTPATIENT)
Dept: HOME HEALTH SERVICES | Facility: HOME HEALTH | Age: 64
End: 2019-05-03

## 2019-05-04 ENCOUNTER — HOME CARE/HOSPICE - HEALTHEAST (OUTPATIENT)
Dept: HOME HEALTH SERVICES | Facility: HOME HEALTH | Age: 64
End: 2019-05-04

## 2019-05-08 ENCOUNTER — RECORDS - HEALTHEAST (OUTPATIENT)
Dept: ADMINISTRATIVE | Facility: OTHER | Age: 64
End: 2019-05-08

## 2019-05-10 ENCOUNTER — HOSPITAL ENCOUNTER (OUTPATIENT)
Dept: RADIOLOGY | Facility: HOSPITAL | Age: 64
Discharge: HOME | End: 2019-05-10
Attending: FAMILY MEDICINE

## 2019-05-10 DIAGNOSIS — R09.89 OTHER SPECIFIED SYMPTOMS AND SIGNS INVOLVING THE CIRCULATORY AND RESPIRATORY SYSTEMS: ICD-10-CM

## 2019-05-10 DIAGNOSIS — R09.89 RESPIRATORY CRACKLES: ICD-10-CM

## 2019-05-17 ENCOUNTER — HOME CARE/HOSPICE - HEALTHEAST (OUTPATIENT)
Dept: HOME HEALTH SERVICES | Facility: HOME HEALTH | Age: 64
End: 2019-05-17

## 2019-05-21 ENCOUNTER — RECORDS - HEALTHEAST (OUTPATIENT)
Dept: ADMINISTRATIVE | Facility: OTHER | Age: 64
End: 2019-05-21

## 2019-05-24 ENCOUNTER — AMBULATORY - HEALTHEAST (OUTPATIENT)
Dept: CARDIOLOGY | Facility: CLINIC | Age: 64
End: 2019-05-24

## 2019-05-24 ENCOUNTER — OFFICE VISIT - HEALTHEAST (OUTPATIENT)
Dept: CARDIOLOGY | Facility: CLINIC | Age: 64
End: 2019-05-24

## 2019-05-24 DIAGNOSIS — E78.5 DYSLIPIDEMIA, GOAL LDL BELOW 70: ICD-10-CM

## 2019-05-24 DIAGNOSIS — I25.10 CORONARY ARTERY DISEASE INVOLVING NATIVE CORONARY ARTERY OF NATIVE HEART WITHOUT ANGINA PECTORIS: ICD-10-CM

## 2019-05-24 DIAGNOSIS — I42.0 DILATED CARDIOMYOPATHY (H): ICD-10-CM

## 2019-05-24 DIAGNOSIS — I50.22 CHRONIC SYSTOLIC HEART FAILURE (H): ICD-10-CM

## 2019-05-24 DIAGNOSIS — Z95.810 ICD (IMPLANTABLE CARDIOVERTER-DEFIBRILLATOR) IN PLACE: ICD-10-CM

## 2019-05-24 DIAGNOSIS — I25.10 CAD (CORONARY ARTERY DISEASE): ICD-10-CM

## 2019-05-24 DIAGNOSIS — I48.19 PERSISTENT ATRIAL FIBRILLATION (H): ICD-10-CM

## 2019-05-24 DIAGNOSIS — N18.30 CHRONIC KIDNEY DISEASE (CKD), STAGE III (MODERATE) (H): ICD-10-CM

## 2019-05-24 ASSESSMENT — MIFFLIN-ST. JEOR: SCORE: 1407.69

## 2019-05-29 ENCOUNTER — COMMUNICATION - HEALTHEAST (OUTPATIENT)
Dept: CARDIOLOGY | Facility: CLINIC | Age: 64
End: 2019-05-29

## 2019-05-31 ENCOUNTER — HOME CARE/HOSPICE - HEALTHEAST (OUTPATIENT)
Dept: HOME HEALTH SERVICES | Facility: HOME HEALTH | Age: 64
End: 2019-05-31

## 2019-06-12 ENCOUNTER — HOSPITAL ENCOUNTER (OUTPATIENT)
Dept: CARDIOLOGY | Facility: HOSPITAL | Age: 64
Discharge: HOME | End: 2019-06-12
Attending: INTERNAL MEDICINE

## 2019-06-12 DIAGNOSIS — I25.10 CAD (CORONARY ARTERY DISEASE): ICD-10-CM

## 2019-06-13 LAB
AORTIC ROOT: 3.9 CM
ASCENDING AORTA: 3.9 CM
DOP CALC LVOT AREA: 2.83 CM2
DOP CALC LVOT DIAMETER: 1.9 CM
DOP CALC LVOT PEAK VEL: 60.6 CM/S
DOP CALC LVOT STROKE VOLUME: 28.9 CM3
DOP CALCLVOT PEAK VEL VTI: 10.2 CM
EJECTION FRACTION: 33 % (ref 55–75)
FRACTIONAL SHORTENING: 31.7 % (ref 28–44)
INTERVENTRICULAR SEPTUM IN END DIASTOLE: 1.03 CM (ref 0.6–1)
IVS/PW RATIO: 1.1
LA AREA 1: 18 CM2
LA AREA 2: 20 CM2
LEFT ATRIUM LENGTH: 5 CM
LEFT ATRIUM SIZE: 3.8 CM
LEFT ATRIUM VOLUME: 61.2 ML
LEFT VENTRICLE DIASTOLIC VOLUME: 73.6 CM3 (ref 62–150)
LEFT VENTRICLE SYSTOLIC VOLUME: 49.3 CM3 (ref 21–61)
LEFT VENTRICULAR INTERNAL DIMENSION IN DIASTOLE: 5.37 CM (ref 4.2–5.8)
LEFT VENTRICULAR INTERNAL DIMENSION IN SYSTOLE: 3.67 CM (ref 2.5–4)
LEFT VENTRICULAR MASS: 203.5 G
LEFT VENTRICULAR OUTFLOW TRACT MEAN GRADIENT: 1 MMHG
LEFT VENTRICULAR OUTFLOW TRACT MEAN VELOCITY: 36.8 CM/S
LEFT VENTRICULAR OUTFLOW TRACT PEAK GRADIENT: 1 MMHG
LEFT VENTRICULAR POSTERIOR WALL IN END DIASTOLE: 0.96 CM (ref 0.6–1)
MITRAL REGURGITANT VELOCITY TIME INTEGRAL: 129 CM
MITRAL VALVE DECELERATION SLOPE: 6850 MM/S2
MITRAL VALVE PRESSURE HALF-TIME: 42 MS
MR FLOW: 20 CM3
MR MEAN GRADIENT: 41 MMHG
MR MEAN VELOCITY: 337 CM/S
MR PEAK GRADIENT: 79.6 MMHG
MR PISA EROA: 0.1 CM2
MR PISA RADIUS: 0.5 CM
MR PISA VN NYQUIST: 38.5 CM/S
MV AVERAGE E/E' RATIO: 11.4 CM/S
MV DECELERATION TIME: 145 MS
MV E'TISSUE VEL-LAT: 11 CM/S
MV E'TISSUE VEL-MED: 6.42 CM/S
MV LATERAL E/E' RATIO: 9
MV MEDIAL E/E' RATIO: 15.5
MV PEAK E VELOCITY: 99.3 CM/S
MV REGURGITANT VOLUME: 17.5 CC
MV VALVE AREA PRESSURE 1/2 METHOD: 5.2 CM2
PISA MR PEAK VEL: 446 CM/S
PR MAX PG: 4 MMHG
PR PEAK VELOCITY: 96.5 CM/S
TRICUSPID REGURGITATION PEAK PRESSURE GRADIENT: 24.4 MMHG
TRICUSPID VALVE ANULAR PLANE SYSTOLIC EXCURSION: 1.7 CM
TRICUSPID VALVE PEAK REGURGITANT VELOCITY: 247 CM/S

## 2019-06-14 ENCOUNTER — HOME CARE/HOSPICE - HEALTHEAST (OUTPATIENT)
Dept: HOME HEALTH SERVICES | Facility: HOME HEALTH | Age: 64
End: 2019-06-14

## 2019-06-15 ENCOUNTER — HOME CARE/HOSPICE - HEALTHEAST (OUTPATIENT)
Dept: HOME HEALTH SERVICES | Facility: HOME HEALTH | Age: 64
End: 2019-06-15

## 2019-06-20 ENCOUNTER — HOME CARE/HOSPICE - HEALTHEAST (OUTPATIENT)
Dept: HOME HEALTH SERVICES | Facility: HOME HEALTH | Age: 64
End: 2019-06-20

## 2019-06-27 ENCOUNTER — HOME CARE/HOSPICE - HEALTHEAST (OUTPATIENT)
Dept: HOME HEALTH SERVICES | Facility: HOME HEALTH | Age: 64
End: 2019-06-27

## 2019-07-11 ENCOUNTER — HOME CARE/HOSPICE - HEALTHEAST (OUTPATIENT)
Dept: HOME HEALTH SERVICES | Facility: HOME HEALTH | Age: 64
End: 2019-07-11

## 2019-07-19 ENCOUNTER — OFFICE VISIT - HEALTHEAST (OUTPATIENT)
Dept: CARDIOLOGY | Facility: CLINIC | Age: 64
End: 2019-07-19

## 2019-07-19 DIAGNOSIS — I25.10 CORONARY ARTERY DISEASE INVOLVING NATIVE CORONARY ARTERY OF NATIVE HEART WITHOUT ANGINA PECTORIS: ICD-10-CM

## 2019-07-19 DIAGNOSIS — I42.0 DILATED CARDIOMYOPATHY (H): ICD-10-CM

## 2019-07-19 DIAGNOSIS — I50.22 CHRONIC SYSTOLIC HEART FAILURE (H): ICD-10-CM

## 2019-07-19 DIAGNOSIS — I48.19 PERSISTENT ATRIAL FIBRILLATION (H): ICD-10-CM

## 2019-07-19 ASSESSMENT — MIFFLIN-ST. JEOR: SCORE: 1439.85

## 2019-07-25 ENCOUNTER — HOME CARE/HOSPICE - HEALTHEAST (OUTPATIENT)
Dept: HOME HEALTH SERVICES | Facility: HOME HEALTH | Age: 64
End: 2019-07-25

## 2019-08-07 ENCOUNTER — HOME CARE/HOSPICE - HEALTHEAST (OUTPATIENT)
Dept: HOME HEALTH SERVICES | Facility: HOME HEALTH | Age: 64
End: 2019-08-07

## 2019-08-19 ENCOUNTER — HOME CARE/HOSPICE - HEALTHEAST (OUTPATIENT)
Dept: HOME HEALTH SERVICES | Facility: HOME HEALTH | Age: 64
End: 2019-08-19

## 2019-08-26 ENCOUNTER — AMBULATORY - HEALTHEAST (OUTPATIENT)
Dept: CARDIOLOGY | Facility: CLINIC | Age: 64
End: 2019-08-26

## 2019-08-26 DIAGNOSIS — Z95.810 ICD (IMPLANTABLE CARDIOVERTER-DEFIBRILLATOR) IN PLACE: ICD-10-CM

## 2019-09-03 ENCOUNTER — HOME CARE/HOSPICE - HEALTHEAST (OUTPATIENT)
Dept: HOME HEALTH SERVICES | Facility: HOME HEALTH | Age: 64
End: 2019-09-03

## 2019-09-10 ENCOUNTER — OFFICE VISIT - HEALTHEAST (OUTPATIENT)
Dept: CARDIOLOGY | Facility: CLINIC | Age: 64
End: 2019-09-10

## 2019-09-10 ENCOUNTER — COMMUNICATION - HEALTHEAST (OUTPATIENT)
Dept: CARDIOLOGY | Facility: CLINIC | Age: 64
End: 2019-09-10

## 2019-09-10 DIAGNOSIS — I42.0 DILATED CARDIOMYOPATHY (H): ICD-10-CM

## 2019-09-10 DIAGNOSIS — I10 ESSENTIAL HYPERTENSION: ICD-10-CM

## 2019-09-10 DIAGNOSIS — I48.19 PERSISTENT ATRIAL FIBRILLATION (H): ICD-10-CM

## 2019-09-10 DIAGNOSIS — I25.10 CORONARY ARTERY DISEASE INVOLVING NATIVE CORONARY ARTERY OF NATIVE HEART WITHOUT ANGINA PECTORIS: ICD-10-CM

## 2019-09-10 DIAGNOSIS — E78.5 DYSLIPIDEMIA, GOAL LDL BELOW 70: ICD-10-CM

## 2019-09-10 DIAGNOSIS — I25.5 ISCHEMIC CARDIOMYOPATHY: ICD-10-CM

## 2019-09-10 ASSESSMENT — MIFFLIN-ST. JEOR: SCORE: 1425.83

## 2019-09-11 ENCOUNTER — HOME CARE/HOSPICE - HEALTHEAST (OUTPATIENT)
Dept: HOME HEALTH SERVICES | Facility: HOME HEALTH | Age: 64
End: 2019-09-11

## 2019-09-17 ENCOUNTER — HOME CARE/HOSPICE - HEALTHEAST (OUTPATIENT)
Dept: HOME HEALTH SERVICES | Facility: HOME HEALTH | Age: 64
End: 2019-09-17

## 2019-09-18 ENCOUNTER — RECORDS - HEALTHEAST (OUTPATIENT)
Dept: ADMINISTRATIVE | Facility: OTHER | Age: 64
End: 2019-09-18

## 2019-09-30 ENCOUNTER — HOME CARE/HOSPICE - HEALTHEAST (OUTPATIENT)
Dept: HOME HEALTH SERVICES | Facility: HOME HEALTH | Age: 64
End: 2019-09-30

## 2019-10-14 ENCOUNTER — HOME CARE/HOSPICE - HEALTHEAST (OUTPATIENT)
Dept: HOME HEALTH SERVICES | Facility: HOME HEALTH | Age: 64
End: 2019-10-14

## 2019-10-28 ENCOUNTER — AMBULATORY - HEALTHEAST (OUTPATIENT)
Dept: CARDIOLOGY | Facility: CLINIC | Age: 64
End: 2019-10-28

## 2019-10-28 ENCOUNTER — RECORDS - HEALTHEAST (OUTPATIENT)
Dept: ADMINISTRATIVE | Facility: OTHER | Age: 64
End: 2019-10-28

## 2019-10-28 ENCOUNTER — HOME CARE/HOSPICE - HEALTHEAST (OUTPATIENT)
Dept: HOME HEALTH SERVICES | Facility: HOME HEALTH | Age: 64
End: 2019-10-28

## 2019-10-29 ENCOUNTER — OFFICE VISIT - HEALTHEAST (OUTPATIENT)
Dept: CARDIOLOGY | Facility: CLINIC | Age: 64
End: 2019-10-29

## 2019-10-29 DIAGNOSIS — Z95.810 ICD (IMPLANTABLE CARDIOVERTER-DEFIBRILLATOR) IN PLACE: ICD-10-CM

## 2019-10-29 DIAGNOSIS — I50.22 CHRONIC SYSTOLIC HEART FAILURE (H): ICD-10-CM

## 2019-10-29 DIAGNOSIS — I48.19 PERSISTENT ATRIAL FIBRILLATION (H): ICD-10-CM

## 2019-10-29 DIAGNOSIS — I95.9 HYPOTENSION, UNSPECIFIED HYPOTENSION TYPE: ICD-10-CM

## 2019-10-29 DIAGNOSIS — N18.30 CHRONIC KIDNEY DISEASE (CKD), STAGE III (MODERATE) (H): ICD-10-CM

## 2019-10-29 DIAGNOSIS — I42.0 DILATED CARDIOMYOPATHY (H): ICD-10-CM

## 2019-10-29 ASSESSMENT — MIFFLIN-ST. JEOR: SCORE: 1434.9

## 2019-11-11 ENCOUNTER — HOME CARE/HOSPICE - HEALTHEAST (OUTPATIENT)
Dept: HOME HEALTH SERVICES | Facility: HOME HEALTH | Age: 64
End: 2019-11-11

## 2019-11-25 ENCOUNTER — HOME CARE/HOSPICE - HEALTHEAST (OUTPATIENT)
Dept: HOME HEALTH SERVICES | Facility: HOME HEALTH | Age: 64
End: 2019-11-25

## 2019-12-02 ENCOUNTER — AMBULATORY - HEALTHEAST (OUTPATIENT)
Dept: CARDIOLOGY | Facility: CLINIC | Age: 64
End: 2019-12-02

## 2019-12-02 DIAGNOSIS — Z95.810 ICD (IMPLANTABLE CARDIOVERTER-DEFIBRILLATOR) IN PLACE: ICD-10-CM

## 2019-12-02 DIAGNOSIS — I25.10 CORONARY ARTERY DISEASE INVOLVING NATIVE CORONARY ARTERY OF NATIVE HEART WITHOUT ANGINA PECTORIS: ICD-10-CM

## 2019-12-02 DIAGNOSIS — I49.01 VF (VENTRICULAR FIBRILLATION) (H): ICD-10-CM

## 2019-12-02 DIAGNOSIS — I42.0 DILATED CARDIOMYOPATHY (H): ICD-10-CM

## 2019-12-02 DIAGNOSIS — I10 ESSENTIAL HYPERTENSION: ICD-10-CM

## 2019-12-02 DIAGNOSIS — N18.30 CHRONIC KIDNEY DISEASE (CKD), STAGE III (MODERATE) (H): ICD-10-CM

## 2019-12-02 DIAGNOSIS — I48.19 PERSISTENT ATRIAL FIBRILLATION (H): ICD-10-CM

## 2019-12-02 ASSESSMENT — MIFFLIN-ST. JEOR: SCORE: 1471.19

## 2019-12-09 ENCOUNTER — HOME CARE/HOSPICE - HEALTHEAST (OUTPATIENT)
Dept: HOME HEALTH SERVICES | Facility: HOME HEALTH | Age: 64
End: 2019-12-09

## 2019-12-16 ENCOUNTER — HOME CARE/HOSPICE - HEALTHEAST (OUTPATIENT)
Dept: HOME HEALTH SERVICES | Facility: HOME HEALTH | Age: 64
End: 2019-12-16

## 2019-12-23 ENCOUNTER — HOME CARE/HOSPICE - HEALTHEAST (OUTPATIENT)
Dept: HOME HEALTH SERVICES | Facility: HOME HEALTH | Age: 64
End: 2019-12-23

## 2020-01-06 ENCOUNTER — HOME CARE/HOSPICE - HEALTHEAST (OUTPATIENT)
Dept: HOME HEALTH SERVICES | Facility: HOME HEALTH | Age: 65
End: 2020-01-06

## 2020-01-20 ENCOUNTER — HOME CARE/HOSPICE - HEALTHEAST (OUTPATIENT)
Dept: HOME HEALTH SERVICES | Facility: HOME HEALTH | Age: 65
End: 2020-01-20

## 2020-02-03 ENCOUNTER — COMMUNICATION - HEALTHEAST (OUTPATIENT)
Dept: CARDIOLOGY | Facility: CLINIC | Age: 65
End: 2020-02-03

## 2020-02-03 ENCOUNTER — HOME CARE/HOSPICE - HEALTHEAST (OUTPATIENT)
Dept: HOME HEALTH SERVICES | Facility: HOME HEALTH | Age: 65
End: 2020-02-03

## 2020-02-03 DIAGNOSIS — I10 ESSENTIAL HYPERTENSION: ICD-10-CM

## 2020-02-03 DIAGNOSIS — I25.5 ISCHEMIC CARDIOMYOPATHY: ICD-10-CM

## 2020-02-05 ENCOUNTER — HOME CARE/HOSPICE - HEALTHEAST (OUTPATIENT)
Dept: HOME HEALTH SERVICES | Facility: HOME HEALTH | Age: 65
End: 2020-02-05

## 2020-02-06 ENCOUNTER — COMMUNICATION - HEALTHEAST (OUTPATIENT)
Dept: CARDIOLOGY | Facility: CLINIC | Age: 65
End: 2020-02-06

## 2020-02-06 DIAGNOSIS — I48.19 PERSISTENT ATRIAL FIBRILLATION (H): ICD-10-CM

## 2020-02-06 DIAGNOSIS — Z95.810 ICD (IMPLANTABLE CARDIOVERTER-DEFIBRILLATOR) IN PLACE: ICD-10-CM

## 2020-02-06 DIAGNOSIS — I47.20 VENTRICULAR TACHYCARDIA (H): ICD-10-CM

## 2020-02-12 ENCOUNTER — HOME CARE/HOSPICE - HEALTHEAST (OUTPATIENT)
Dept: HOME HEALTH SERVICES | Facility: HOME HEALTH | Age: 65
End: 2020-02-12

## 2020-02-13 ENCOUNTER — RECORDS - HEALTHEAST (OUTPATIENT)
Dept: ADMINISTRATIVE | Facility: OTHER | Age: 65
End: 2020-02-13

## 2020-02-17 ENCOUNTER — HOME CARE/HOSPICE - HEALTHEAST (OUTPATIENT)
Dept: HOME HEALTH SERVICES | Facility: HOME HEALTH | Age: 65
End: 2020-02-17

## 2020-02-18 ENCOUNTER — HOME CARE/HOSPICE - HEALTHEAST (OUTPATIENT)
Dept: HOME HEALTH SERVICES | Facility: HOME HEALTH | Age: 65
End: 2020-02-18

## 2020-02-19 ENCOUNTER — AMBULATORY - HEALTHEAST (OUTPATIENT)
Dept: CARDIOLOGY | Facility: CLINIC | Age: 65
End: 2020-02-19

## 2020-02-19 ENCOUNTER — OFFICE VISIT - HEALTHEAST (OUTPATIENT)
Dept: CARDIOLOGY | Facility: CLINIC | Age: 65
End: 2020-02-19

## 2020-02-19 DIAGNOSIS — I50.22 CHRONIC SYSTOLIC HEART FAILURE (H): ICD-10-CM

## 2020-02-19 DIAGNOSIS — R06.00 DYSPNEA: ICD-10-CM

## 2020-02-19 DIAGNOSIS — Z95.810 ICD (IMPLANTABLE CARDIOVERTER-DEFIBRILLATOR) IN PLACE: ICD-10-CM

## 2020-02-19 DIAGNOSIS — I25.10 CORONARY ARTERY DISEASE INVOLVING NATIVE CORONARY ARTERY OF NATIVE HEART WITHOUT ANGINA PECTORIS: ICD-10-CM

## 2020-02-19 DIAGNOSIS — I42.0 DILATED CARDIOMYOPATHY (H): ICD-10-CM

## 2020-02-19 DIAGNOSIS — N18.30 CHRONIC KIDNEY DISEASE (CKD), STAGE III (MODERATE) (H): ICD-10-CM

## 2020-02-19 DIAGNOSIS — I48.19 PERSISTENT ATRIAL FIBRILLATION (H): ICD-10-CM

## 2020-02-19 DIAGNOSIS — E78.5 DYSLIPIDEMIA, GOAL LDL BELOW 70: ICD-10-CM

## 2020-02-19 DIAGNOSIS — R05.9 COUGH: ICD-10-CM

## 2020-02-19 RX ORDER — CETIRIZINE HYDROCHLORIDE 10 MG/1
1 TABLET ORAL DAILY
Refills: 0 | Status: SHIPPED | COMMUNITY
Start: 2019-12-04

## 2020-02-19 ASSESSMENT — MIFFLIN-ST. JEOR: SCORE: 1457.58

## 2020-02-20 ENCOUNTER — AMBULATORY - HEALTHEAST (OUTPATIENT)
Dept: CARDIOLOGY | Facility: CLINIC | Age: 65
End: 2020-02-20

## 2020-02-20 ENCOUNTER — HOME CARE/HOSPICE - HEALTHEAST (OUTPATIENT)
Dept: HOME HEALTH SERVICES | Facility: HOME HEALTH | Age: 65
End: 2020-02-20

## 2020-02-20 DIAGNOSIS — I42.0 DILATED CARDIOMYOPATHY (H): ICD-10-CM

## 2020-02-20 DIAGNOSIS — I48.19 PERSISTENT ATRIAL FIBRILLATION (H): ICD-10-CM

## 2020-02-20 LAB
ATRIAL RATE - MUSE: 159 BPM
DIASTOLIC BLOOD PRESSURE - MUSE: NORMAL
INTERPRETATION ECG - MUSE: NORMAL
P AXIS - MUSE: NORMAL
PR INTERVAL - MUSE: NORMAL
QRS DURATION - MUSE: 86 MS
QT - MUSE: 394 MS
QTC - MUSE: 449 MS
R AXIS - MUSE: 3 DEGREES
SYSTOLIC BLOOD PRESSURE - MUSE: NORMAL
T AXIS - MUSE: 21 DEGREES
VENTRICULAR RATE- MUSE: 78 BPM

## 2020-03-04 ENCOUNTER — HOME CARE/HOSPICE - HEALTHEAST (OUTPATIENT)
Dept: HOME HEALTH SERVICES | Facility: HOME HEALTH | Age: 65
End: 2020-03-04

## 2020-03-05 ENCOUNTER — RECORDS - HEALTHEAST (OUTPATIENT)
Dept: ADMINISTRATIVE | Facility: OTHER | Age: 65
End: 2020-03-05

## 2020-03-06 ENCOUNTER — HOME CARE/HOSPICE - HEALTHEAST (OUTPATIENT)
Dept: HOME HEALTH SERVICES | Facility: HOME HEALTH | Age: 65
End: 2020-03-06

## 2020-03-08 ENCOUNTER — HOME CARE/HOSPICE - HEALTHEAST (OUTPATIENT)
Dept: HOME HEALTH SERVICES | Facility: HOME HEALTH | Age: 65
End: 2020-03-08

## 2020-03-13 ENCOUNTER — HOME CARE/HOSPICE - HEALTHEAST (OUTPATIENT)
Dept: HOME HEALTH SERVICES | Facility: HOME HEALTH | Age: 65
End: 2020-03-13

## 2020-03-20 ENCOUNTER — HOME CARE/HOSPICE - HEALTHEAST (OUTPATIENT)
Dept: HOME HEALTH SERVICES | Facility: HOME HEALTH | Age: 65
End: 2020-03-20

## 2020-04-03 ENCOUNTER — HOME CARE/HOSPICE - HEALTHEAST (OUTPATIENT)
Dept: HOME HEALTH SERVICES | Facility: HOME HEALTH | Age: 65
End: 2020-04-03

## 2020-04-13 ENCOUNTER — AMBULATORY - HEALTHEAST (OUTPATIENT)
Dept: CARDIOLOGY | Facility: CLINIC | Age: 65
End: 2020-04-13

## 2020-04-13 ENCOUNTER — COMMUNICATION - HEALTHEAST (OUTPATIENT)
Dept: CARDIOLOGY | Facility: CLINIC | Age: 65
End: 2020-04-13

## 2020-04-13 DIAGNOSIS — I48.19 PERSISTENT ATRIAL FIBRILLATION (H): ICD-10-CM

## 2020-04-13 DIAGNOSIS — I42.0 DILATED CARDIOMYOPATHY (H): ICD-10-CM

## 2020-04-13 DIAGNOSIS — Z95.810 ICD (IMPLANTABLE CARDIOVERTER-DEFIBRILLATOR) IN PLACE: ICD-10-CM

## 2020-04-15 ENCOUNTER — AMBULATORY - HEALTHEAST (OUTPATIENT)
Dept: CARDIOLOGY | Facility: CLINIC | Age: 65
End: 2020-04-15

## 2020-04-15 DIAGNOSIS — Z95.810 ICD (IMPLANTABLE CARDIOVERTER-DEFIBRILLATOR) IN PLACE: ICD-10-CM

## 2020-04-15 DIAGNOSIS — I49.01 VF (VENTRICULAR FIBRILLATION) (H): ICD-10-CM

## 2020-04-15 DIAGNOSIS — I48.21 PERMANENT ATRIAL FIBRILLATION (H): ICD-10-CM

## 2020-04-15 DIAGNOSIS — Z59.41 FOOD INSECURITY: ICD-10-CM

## 2020-04-15 DIAGNOSIS — Z73.6 LIMITATION OF ACTIVITIES DUE TO DISABILITY: ICD-10-CM

## 2020-04-15 DIAGNOSIS — I50.22 CHRONIC SYSTOLIC HEART FAILURE (H): ICD-10-CM

## 2020-04-15 DIAGNOSIS — I42.0 DILATED CARDIOMYOPATHY (H): ICD-10-CM

## 2020-04-15 DIAGNOSIS — I47.20 VENTRICULAR TACHYCARDIA (H): ICD-10-CM

## 2020-04-15 SDOH — ECONOMIC STABILITY - FOOD INSECURITY: FOOD INSECURITY: Z59.41

## 2020-04-15 ASSESSMENT — MIFFLIN-ST. JEOR: SCORE: 1459.85

## 2020-04-17 ENCOUNTER — HOME CARE/HOSPICE - HEALTHEAST (OUTPATIENT)
Dept: HOME HEALTH SERVICES | Facility: HOME HEALTH | Age: 65
End: 2020-04-17

## 2020-04-21 ENCOUNTER — COMMUNICATION - HEALTHEAST (OUTPATIENT)
Dept: CARDIOLOGY | Facility: CLINIC | Age: 65
End: 2020-04-21

## 2020-04-27 ENCOUNTER — RECORDS - HEALTHEAST (OUTPATIENT)
Dept: ADMINISTRATIVE | Facility: OTHER | Age: 65
End: 2020-04-27

## 2020-04-29 ENCOUNTER — OFFICE VISIT - HEALTHEAST (OUTPATIENT)
Dept: CARDIOLOGY | Facility: CLINIC | Age: 65
End: 2020-04-29

## 2020-04-29 DIAGNOSIS — I42.0 DILATED CARDIOMYOPATHY (H): ICD-10-CM

## 2020-04-29 DIAGNOSIS — I50.22 CHRONIC SYSTOLIC HEART FAILURE (H): ICD-10-CM

## 2020-04-29 DIAGNOSIS — I25.10 CAD (CORONARY ARTERY DISEASE): ICD-10-CM

## 2020-04-29 DIAGNOSIS — I48.21 PERMANENT ATRIAL FIBRILLATION (H): ICD-10-CM

## 2020-04-29 DIAGNOSIS — I25.10 CORONARY ARTERY DISEASE INVOLVING NATIVE CORONARY ARTERY OF NATIVE HEART WITHOUT ANGINA PECTORIS: ICD-10-CM

## 2020-04-29 DIAGNOSIS — Z95.5 S/P CORONARY ARTERY STENT PLACEMENT: ICD-10-CM

## 2020-04-30 ENCOUNTER — HOME CARE/HOSPICE - HEALTHEAST (OUTPATIENT)
Dept: HOME HEALTH SERVICES | Facility: HOME HEALTH | Age: 65
End: 2020-04-30

## 2020-04-30 ENCOUNTER — AMBULATORY - HEALTHEAST (OUTPATIENT)
Dept: CARDIOLOGY | Facility: CLINIC | Age: 65
End: 2020-04-30

## 2020-04-30 DIAGNOSIS — I42.0 DILATED CARDIOMYOPATHY (H): ICD-10-CM

## 2020-04-30 DIAGNOSIS — Z95.810 ICD (IMPLANTABLE CARDIOVERTER-DEFIBRILLATOR) IN PLACE: ICD-10-CM

## 2020-05-01 ENCOUNTER — OFFICE VISIT - HEALTHEAST (OUTPATIENT)
Dept: CARDIOLOGY | Facility: CLINIC | Age: 65
End: 2020-05-01

## 2020-05-01 ENCOUNTER — COMMUNICATION - HEALTHEAST (OUTPATIENT)
Dept: CARDIOLOGY | Facility: CLINIC | Age: 65
End: 2020-05-01

## 2020-05-01 DIAGNOSIS — Z95.810 DUAL ICD (IMPLANTABLE CARDIOVERTER-DEFIBRILLATOR) IN PLACE: ICD-10-CM

## 2020-05-01 DIAGNOSIS — I25.5 ISCHEMIC CARDIOMYOPATHY: ICD-10-CM

## 2020-05-01 DIAGNOSIS — Z79.01 CURRENT USE OF LONG TERM ANTICOAGULATION: ICD-10-CM

## 2020-05-01 DIAGNOSIS — I48.21 PERMANENT ATRIAL FIBRILLATION (H): ICD-10-CM

## 2020-05-01 DIAGNOSIS — N18.4 STAGE 4 CHRONIC KIDNEY DISEASE (H): ICD-10-CM

## 2020-05-01 DIAGNOSIS — I50.22 CHRONIC SYSTOLIC CONGESTIVE HEART FAILURE (H): ICD-10-CM

## 2020-05-01 ASSESSMENT — MIFFLIN-ST. JEOR: SCORE: 1455.32

## 2020-05-11 ENCOUNTER — COMMUNICATION - HEALTHEAST (OUTPATIENT)
Dept: CARDIOLOGY | Facility: CLINIC | Age: 65
End: 2020-05-11

## 2020-05-11 ENCOUNTER — AMBULATORY - HEALTHEAST (OUTPATIENT)
Dept: CARDIOLOGY | Facility: CLINIC | Age: 65
End: 2020-05-11

## 2020-05-11 DIAGNOSIS — I42.0 DILATED CARDIOMYOPATHY (H): ICD-10-CM

## 2020-05-11 DIAGNOSIS — I48.19 PERSISTENT ATRIAL FIBRILLATION (H): ICD-10-CM

## 2020-05-14 ENCOUNTER — HOME CARE/HOSPICE - HEALTHEAST (OUTPATIENT)
Dept: HOME HEALTH SERVICES | Facility: HOME HEALTH | Age: 65
End: 2020-05-14

## 2020-05-14 ENCOUNTER — COMMUNICATION - HEALTHEAST (OUTPATIENT)
Dept: CARDIOLOGY | Facility: CLINIC | Age: 65
End: 2020-05-14

## 2020-05-14 DIAGNOSIS — I10 ESSENTIAL HYPERTENSION: ICD-10-CM

## 2020-05-14 DIAGNOSIS — I25.5 ISCHEMIC CARDIOMYOPATHY: ICD-10-CM

## 2020-05-28 ENCOUNTER — HOME CARE/HOSPICE - HEALTHEAST (OUTPATIENT)
Dept: HOME HEALTH SERVICES | Facility: HOME HEALTH | Age: 65
End: 2020-05-28

## 2020-05-29 ENCOUNTER — RECORDS - HEALTHEAST (OUTPATIENT)
Dept: ADMINISTRATIVE | Facility: OTHER | Age: 65
End: 2020-05-29

## 2020-06-04 ENCOUNTER — OFFICE VISIT - HEALTHEAST (OUTPATIENT)
Dept: CARDIOLOGY | Facility: CLINIC | Age: 65
End: 2020-06-04

## 2020-06-04 ENCOUNTER — COMMUNICATION - HEALTHEAST (OUTPATIENT)
Dept: CARDIOLOGY | Facility: CLINIC | Age: 65
End: 2020-06-04

## 2020-06-04 DIAGNOSIS — I25.10 CAD (CORONARY ARTERY DISEASE): ICD-10-CM

## 2020-06-05 ENCOUNTER — RECORDS - HEALTHEAST (OUTPATIENT)
Dept: ADMINISTRATIVE | Facility: OTHER | Age: 65
End: 2020-06-05

## 2020-06-05 ENCOUNTER — COMMUNICATION - HEALTHEAST (OUTPATIENT)
Dept: CARDIOLOGY | Facility: CLINIC | Age: 65
End: 2020-06-05

## 2020-06-05 DIAGNOSIS — I25.10 CAD (CORONARY ARTERY DISEASE): ICD-10-CM

## 2020-06-11 ENCOUNTER — HOME CARE/HOSPICE - HEALTHEAST (OUTPATIENT)
Dept: HOME HEALTH SERVICES | Facility: HOME HEALTH | Age: 65
End: 2020-06-11

## 2020-06-11 ENCOUNTER — COMMUNICATION - HEALTHEAST (OUTPATIENT)
Dept: CARDIOLOGY | Facility: CLINIC | Age: 65
End: 2020-06-11

## 2020-06-25 ENCOUNTER — HOME CARE/HOSPICE - HEALTHEAST (OUTPATIENT)
Dept: HOME HEALTH SERVICES | Facility: HOME HEALTH | Age: 65
End: 2020-06-25

## 2020-07-02 ENCOUNTER — HOME CARE/HOSPICE - HEALTHEAST (OUTPATIENT)
Dept: HOME HEALTH SERVICES | Facility: HOME HEALTH | Age: 65
End: 2020-07-02

## 2020-07-15 ENCOUNTER — HOME CARE/HOSPICE - HEALTHEAST (OUTPATIENT)
Dept: HOME HEALTH SERVICES | Facility: HOME HEALTH | Age: 65
End: 2020-07-15

## 2020-07-21 ENCOUNTER — HOSPITAL ENCOUNTER (OUTPATIENT)
Dept: CARDIOLOGY | Facility: HOSPITAL | Age: 65
Discharge: HOME | End: 2020-07-21
Attending: INTERNAL MEDICINE

## 2020-07-21 DIAGNOSIS — R06.00 DYSPNEA: ICD-10-CM

## 2020-07-21 LAB
AORTIC ROOT: 3.7 CM
AORTIC VALVE MEAN VELOCITY: 80.9 CM/S
ASCENDING AORTA: 3.9 CM
AV DIMENSIONLESS INDEX VTI: 0.5
AV MEAN GRADIENT: 3 MMHG
AV PEAK GRADIENT: 5.3 MMHG
AV VALVE AREA: 2 CM2
BSA FOR ECHO PROCEDURE: 1.87 M2
CV BLOOD PRESSURE: ABNORMAL MMHG
CV ECHO HEIGHT: 63 IN
CV ECHO WEIGHT: 174 LBS
DOP CALC AO PEAK VEL: 115 CM/S
DOP CALC AO VTI: 23.4 CM
DOP CALC LVOT AREA: 3.8 CM2
DOP CALC LVOT DIAMETER: 2.2 CM
DOP CALC LVOT STROKE VOLUME: 46.7 CM3
DOP CALCLVOT PEAK VEL VTI: 12.3 CM
EJECTION FRACTION: 53 % (ref 55–75)
FRACTIONAL SHORTENING: 31 % (ref 28–44)
INTERVENTRICULAR SEPTUM IN END DIASTOLE: 1.1 CM (ref 0.6–1)
IVS/PW RATIO: 1.1
LA AREA 1: 21.2 CM2
LA AREA 2: 25.8 CM2
LEFT ATRIUM LENGTH: 5.42 CM
LEFT ATRIUM SIZE: 3.5 CM
LEFT ATRIUM VOLUME INDEX: 45.9 ML/M2
LEFT ATRIUM VOLUME: 85.8 ML
LEFT VENTRICLE DIASTOLIC VOLUME INDEX: 45.5 CM3/M2 (ref 34–74)
LEFT VENTRICLE DIASTOLIC VOLUME: 85 CM3 (ref 62–150)
LEFT VENTRICLE MASS INDEX: 78.6 G/M2
LEFT VENTRICLE SYSTOLIC VOLUME INDEX: 21.4 CM3/M2 (ref 11–31)
LEFT VENTRICLE SYSTOLIC VOLUME: 40 CM3 (ref 21–61)
LEFT VENTRICULAR INTERNAL DIMENSION IN DIASTOLE: 4.2 CM (ref 4.2–5.8)
LEFT VENTRICULAR INTERNAL DIMENSION IN SYSTOLE: 2.9 CM (ref 2.5–4)
LEFT VENTRICULAR MASS: 147 G
LEFT VENTRICULAR OUTFLOW TRACT MEAN GRADIENT: 1 MMHG
LEFT VENTRICULAR OUTFLOW TRACT MEAN VELOCITY: 47.4 CM/S
LEFT VENTRICULAR POSTERIOR WALL IN END DIASTOLE: 1 CM (ref 0.6–1)
LV STROKE VOLUME INDEX: 25 ML/M2
MITRAL VALVE E/A RATIO: 2
MV AVERAGE E/E' RATIO: 10.9 CM/S
MV DECELERATION TIME: 204 MS
MV E'TISSUE VEL-LAT: 9.65 CM/S
MV E'TISSUE VEL-MED: 5.56 CM/S
MV LATERAL E/E' RATIO: 8.6
MV MEDIAL E/E' RATIO: 15
MV PEAK A VELOCITY: 42.4 CM/S
MV PEAK E VELOCITY: 83.2 CM/S
NUC REST DIASTOLIC VOLUME INDEX: 2784 LBS
NUC REST SYSTOLIC VOLUME INDEX: 63 IN
PR MAX PG: 3 MMHG
PR PEAK VELOCITY: 108 CM/S
PR PEAK VELOCITY: 72.7 CM/S
PR PEAK VELOCITY: 90.4 CM/S
TRICUSPID REGURGITATION PEAK PRESSURE GRADIENT: 15.5 MMHG
TRICUSPID VALVE ANULAR PLANE SYSTOLIC EXCURSION: 2.2 CM
TRICUSPID VALVE PEAK REGURGITANT VELOCITY: 197 CM/S

## 2020-07-21 ASSESSMENT — MIFFLIN-ST. JEOR: SCORE: 1464.39

## 2020-07-28 ENCOUNTER — AMBULATORY - HEALTHEAST (OUTPATIENT)
Dept: CARDIOLOGY | Facility: CLINIC | Age: 65
End: 2020-07-28

## 2020-07-28 DIAGNOSIS — I47.20 VENTRICULAR TACHYCARDIA (H): ICD-10-CM

## 2020-07-28 DIAGNOSIS — Z95.810 ICD (IMPLANTABLE CARDIOVERTER-DEFIBRILLATOR) IN PLACE: ICD-10-CM

## 2020-07-28 DIAGNOSIS — I50.22 CHRONIC SYSTOLIC HEART FAILURE (H): ICD-10-CM

## 2020-07-28 DIAGNOSIS — I42.0 DILATED CARDIOMYOPATHY (H): ICD-10-CM

## 2020-07-28 DIAGNOSIS — R06.00 DYSPNEA: ICD-10-CM

## 2020-07-28 DIAGNOSIS — I25.5 ISCHEMIC CARDIOMYOPATHY: ICD-10-CM

## 2020-07-29 ENCOUNTER — COMMUNICATION - HEALTHEAST (OUTPATIENT)
Dept: CARDIOLOGY | Facility: CLINIC | Age: 65
End: 2020-07-29

## 2020-07-30 ENCOUNTER — HOME CARE/HOSPICE - HEALTHEAST (OUTPATIENT)
Dept: HOME HEALTH SERVICES | Facility: HOME HEALTH | Age: 65
End: 2020-07-30

## 2020-08-10 ENCOUNTER — COMMUNICATION - HEALTHEAST (OUTPATIENT)
Dept: CARDIOLOGY | Facility: CLINIC | Age: 65
End: 2020-08-10

## 2020-08-10 DIAGNOSIS — I25.5 ISCHEMIC CARDIOMYOPATHY: ICD-10-CM

## 2020-08-10 DIAGNOSIS — I10 ESSENTIAL HYPERTENSION: ICD-10-CM

## 2020-08-13 ENCOUNTER — HOME CARE/HOSPICE - HEALTHEAST (OUTPATIENT)
Dept: HOME HEALTH SERVICES | Facility: HOME HEALTH | Age: 65
End: 2020-08-13

## 2020-08-13 ENCOUNTER — COMMUNICATION - HEALTHEAST (OUTPATIENT)
Dept: CARDIOLOGY | Facility: CLINIC | Age: 65
End: 2020-08-13

## 2020-08-13 DIAGNOSIS — I25.5 ISCHEMIC CARDIOMYOPATHY: ICD-10-CM

## 2020-08-13 DIAGNOSIS — I10 ESSENTIAL HYPERTENSION: ICD-10-CM

## 2020-08-13 RX ORDER — HYDRALAZINE HYDROCHLORIDE 25 MG/1
TABLET, FILM COATED ORAL
Qty: 270 TABLET | Refills: 2 | Status: SHIPPED | OUTPATIENT
Start: 2020-08-13

## 2020-08-28 ENCOUNTER — HOME CARE/HOSPICE - HEALTHEAST (OUTPATIENT)
Dept: HOME HEALTH SERVICES | Facility: HOME HEALTH | Age: 65
End: 2020-08-28

## 2020-09-11 ENCOUNTER — HOME CARE/HOSPICE - HEALTHEAST (OUTPATIENT)
Dept: HOME HEALTH SERVICES | Facility: HOME HEALTH | Age: 65
End: 2020-09-11

## 2020-09-25 ENCOUNTER — HOME CARE/HOSPICE - HEALTHEAST (OUTPATIENT)
Dept: HOME HEALTH SERVICES | Facility: HOME HEALTH | Age: 65
End: 2020-09-25

## 2020-10-02 ENCOUNTER — OFFICE VISIT - HEALTHEAST (OUTPATIENT)
Dept: INTERPRETER SERVICES | Facility: CLINIC | Age: 65
End: 2020-10-02

## 2020-10-08 ENCOUNTER — RECORDS - HEALTHEAST (OUTPATIENT)
Dept: LAB | Facility: CLINIC | Age: 65
End: 2020-10-08

## 2020-10-08 ENCOUNTER — HOME CARE/HOSPICE - HEALTHEAST (OUTPATIENT)
Dept: HOME HEALTH SERVICES | Facility: HOME HEALTH | Age: 65
End: 2020-10-08

## 2020-10-08 LAB
ALBUMIN SERPL-MCNC: 3.5 G/DL (ref 3.5–5)
ALP SERPL-CCNC: 120 U/L (ref 45–120)
ALT SERPL W P-5'-P-CCNC: 20 U/L (ref 0–45)
ANION GAP SERPL CALCULATED.3IONS-SCNC: 10 MMOL/L (ref 5–18)
AST SERPL W P-5'-P-CCNC: 27 U/L (ref 0–40)
BILIRUB SERPL-MCNC: 0.8 MG/DL (ref 0–1)
BUN SERPL-MCNC: 22 MG/DL (ref 8–22)
CALCIUM SERPL-MCNC: 9 MG/DL (ref 8.5–10.5)
CHLORIDE BLD-SCNC: 111 MMOL/L (ref 98–107)
CHOLEST SERPL-MCNC: 127 MG/DL
CO2 SERPL-SCNC: 22 MMOL/L (ref 22–31)
CREAT SERPL-MCNC: 1.59 MG/DL (ref 0.7–1.3)
FASTING STATUS PATIENT QL REPORTED: ABNORMAL
GFR SERPL CREATININE-BSD FRML MDRD: 44 ML/MIN/1.73M2
GLUCOSE BLD-MCNC: 78 MG/DL (ref 70–125)
HDLC SERPL-MCNC: 38 MG/DL
LDLC SERPL CALC-MCNC: 61 MG/DL
POTASSIUM BLD-SCNC: 3.9 MMOL/L (ref 3.5–5)
PROT SERPL-MCNC: 6.9 G/DL (ref 6–8)
SODIUM SERPL-SCNC: 143 MMOL/L (ref 136–145)
TRIGL SERPL-MCNC: 139 MG/DL

## 2020-10-15 ENCOUNTER — COMMUNICATION - HEALTHEAST (OUTPATIENT)
Dept: CARDIOLOGY | Facility: CLINIC | Age: 65
End: 2020-10-15

## 2020-10-23 ENCOUNTER — HOME CARE/HOSPICE - HEALTHEAST (OUTPATIENT)
Dept: HOME HEALTH SERVICES | Facility: HOME HEALTH | Age: 65
End: 2020-10-23

## 2020-10-28 ENCOUNTER — AMBULATORY - HEALTHEAST (OUTPATIENT)
Dept: CARDIOLOGY | Facility: CLINIC | Age: 65
End: 2020-10-28

## 2020-10-28 DIAGNOSIS — Z95.810 ICD (IMPLANTABLE CARDIOVERTER-DEFIBRILLATOR) IN PLACE: ICD-10-CM

## 2020-10-28 DIAGNOSIS — I25.5 ISCHEMIC CARDIOMYOPATHY: ICD-10-CM

## 2020-10-30 ENCOUNTER — HOME CARE/HOSPICE - HEALTHEAST (OUTPATIENT)
Dept: HOME HEALTH SERVICES | Facility: HOME HEALTH | Age: 65
End: 2020-10-30

## 2020-11-05 ENCOUNTER — COMMUNICATION - HEALTHEAST (OUTPATIENT)
Dept: CARDIOLOGY | Facility: CLINIC | Age: 65
End: 2020-11-05

## 2020-11-05 DIAGNOSIS — I48.19 PERSISTENT ATRIAL FIBRILLATION (H): ICD-10-CM

## 2020-11-05 DIAGNOSIS — I42.0 DILATED CARDIOMYOPATHY (H): ICD-10-CM

## 2020-11-05 RX ORDER — DILTIAZEM HYDROCHLORIDE 180 MG/1
180 CAPSULE, COATED, EXTENDED RELEASE ORAL DAILY
Qty: 90 CAPSULE | Refills: 0 | Status: SHIPPED | OUTPATIENT
Start: 2020-11-05 | End: 2021-07-19

## 2020-11-06 ENCOUNTER — RECORDS - HEALTHEAST (OUTPATIENT)
Dept: ADMINISTRATIVE | Facility: OTHER | Age: 65
End: 2020-11-06

## 2020-11-10 ENCOUNTER — COMMUNICATION - HEALTHEAST (OUTPATIENT)
Dept: CARDIOLOGY | Facility: CLINIC | Age: 65
End: 2020-11-10

## 2020-11-11 ENCOUNTER — OFFICE VISIT - HEALTHEAST (OUTPATIENT)
Dept: CARDIOLOGY | Facility: CLINIC | Age: 65
End: 2020-11-11

## 2020-11-11 DIAGNOSIS — N18.30 CHRONIC KIDNEY DISEASE (CKD), STAGE III (MODERATE) (H): ICD-10-CM

## 2020-11-11 DIAGNOSIS — Z95.810 ICD (IMPLANTABLE CARDIOVERTER-DEFIBRILLATOR) IN PLACE: ICD-10-CM

## 2020-11-11 DIAGNOSIS — I48.21 PERMANENT ATRIAL FIBRILLATION (H): ICD-10-CM

## 2020-11-11 DIAGNOSIS — I47.20 VENTRICULAR TACHYCARDIA (H): ICD-10-CM

## 2020-11-11 DIAGNOSIS — E78.5 DYSLIPIDEMIA, GOAL LDL BELOW 70: ICD-10-CM

## 2020-11-11 DIAGNOSIS — I25.10 CORONARY ARTERY DISEASE INVOLVING NATIVE CORONARY ARTERY OF NATIVE HEART WITHOUT ANGINA PECTORIS: ICD-10-CM

## 2020-11-11 DIAGNOSIS — I25.5 ISCHEMIC CARDIOMYOPATHY: ICD-10-CM

## 2020-11-11 DIAGNOSIS — I42.0 DILATED CARDIOMYOPATHY (H): ICD-10-CM

## 2020-11-11 ASSESSMENT — MIFFLIN-ST. JEOR: SCORE: 1455.32

## 2020-11-13 ENCOUNTER — HOME CARE/HOSPICE - HEALTHEAST (OUTPATIENT)
Dept: HOME HEALTH SERVICES | Facility: HOME HEALTH | Age: 65
End: 2020-11-13

## 2020-11-24 ENCOUNTER — AMBULATORY - HEALTHEAST (OUTPATIENT)
Dept: CARDIOLOGY | Facility: CLINIC | Age: 65
End: 2020-11-24

## 2020-11-27 ENCOUNTER — HOME CARE/HOSPICE - HEALTHEAST (OUTPATIENT)
Dept: HOME HEALTH SERVICES | Facility: HOME HEALTH | Age: 65
End: 2020-11-27

## 2020-12-01 ENCOUNTER — COMMUNICATION - HEALTHEAST (OUTPATIENT)
Dept: CARDIOLOGY | Facility: CLINIC | Age: 65
End: 2020-12-01

## 2020-12-01 ENCOUNTER — AMBULATORY - HEALTHEAST (OUTPATIENT)
Dept: CARDIOLOGY | Facility: CLINIC | Age: 65
End: 2020-12-01

## 2020-12-07 ENCOUNTER — COMMUNICATION - HEALTHEAST (OUTPATIENT)
Dept: CARDIOLOGY | Facility: CLINIC | Age: 65
End: 2020-12-07

## 2020-12-07 DIAGNOSIS — I48.19 PERSISTENT ATRIAL FIBRILLATION (H): ICD-10-CM

## 2020-12-07 DIAGNOSIS — I42.0 DILATED CARDIOMYOPATHY (H): ICD-10-CM

## 2020-12-08 ENCOUNTER — AMBULATORY - HEALTHEAST (OUTPATIENT)
Dept: CARDIOLOGY | Facility: CLINIC | Age: 65
End: 2020-12-08

## 2020-12-08 DIAGNOSIS — I42.0 DILATED CARDIOMYOPATHY (H): ICD-10-CM

## 2020-12-08 DIAGNOSIS — Z95.810 ICD (IMPLANTABLE CARDIOVERTER-DEFIBRILLATOR) IN PLACE: ICD-10-CM

## 2020-12-08 RX ORDER — METOPROLOL SUCCINATE 100 MG/1
TABLET, EXTENDED RELEASE ORAL
Qty: 180 TABLET | Refills: 1 | Status: SHIPPED | OUTPATIENT
Start: 2020-12-08

## 2020-12-09 ENCOUNTER — RECORDS - HEALTHEAST (OUTPATIENT)
Dept: ADMINISTRATIVE | Facility: OTHER | Age: 65
End: 2020-12-09

## 2020-12-09 ENCOUNTER — OFFICE VISIT - HEALTHEAST (OUTPATIENT)
Dept: CARDIOLOGY | Facility: CLINIC | Age: 65
End: 2020-12-09

## 2020-12-09 DIAGNOSIS — Z95.810 ICD (IMPLANTABLE CARDIOVERTER-DEFIBRILLATOR) IN PLACE: ICD-10-CM

## 2020-12-09 DIAGNOSIS — I50.22 CHRONIC SYSTOLIC HEART FAILURE (H): ICD-10-CM

## 2020-12-09 DIAGNOSIS — N18.32 STAGE 3B CHRONIC KIDNEY DISEASE (H): ICD-10-CM

## 2020-12-09 DIAGNOSIS — I48.21 PERMANENT ATRIAL FIBRILLATION (H): ICD-10-CM

## 2020-12-11 ENCOUNTER — HOME CARE/HOSPICE - HEALTHEAST (OUTPATIENT)
Dept: HOME HEALTH SERVICES | Facility: HOME HEALTH | Age: 65
End: 2020-12-11

## 2020-12-14 ENCOUNTER — HOME CARE/HOSPICE - HEALTHEAST (OUTPATIENT)
Dept: HOME HEALTH SERVICES | Facility: HOME HEALTH | Age: 65
End: 2020-12-14

## 2020-12-28 ENCOUNTER — HOME CARE/HOSPICE - HEALTHEAST (OUTPATIENT)
Dept: HOME HEALTH SERVICES | Facility: HOME HEALTH | Age: 65
End: 2020-12-28

## 2020-12-29 ENCOUNTER — AMBULATORY - HEALTHEAST (OUTPATIENT)
Dept: CARDIOLOGY | Facility: CLINIC | Age: 65
End: 2020-12-29

## 2020-12-29 ENCOUNTER — COMMUNICATION - HEALTHEAST (OUTPATIENT)
Dept: CARDIOLOGY | Facility: CLINIC | Age: 65
End: 2020-12-29

## 2021-01-05 ENCOUNTER — COMMUNICATION - HEALTHEAST (OUTPATIENT)
Dept: CARDIOLOGY | Facility: CLINIC | Age: 66
End: 2021-01-05

## 2021-01-05 DIAGNOSIS — I47.20 VENTRICULAR TACHYCARDIA (H): ICD-10-CM

## 2021-01-05 DIAGNOSIS — I48.19 PERSISTENT ATRIAL FIBRILLATION (H): ICD-10-CM

## 2021-01-05 DIAGNOSIS — Z95.810 ICD (IMPLANTABLE CARDIOVERTER-DEFIBRILLATOR) IN PLACE: ICD-10-CM

## 2021-01-05 RX ORDER — RIVAROXABAN 15 MG/1
TABLET, FILM COATED ORAL
Qty: 90 TABLET | Refills: 1 | Status: SHIPPED | OUTPATIENT
Start: 2021-01-05

## 2021-01-12 ENCOUNTER — HOME CARE/HOSPICE - HEALTHEAST (OUTPATIENT)
Dept: HOME HEALTH SERVICES | Facility: HOME HEALTH | Age: 66
End: 2021-01-12

## 2021-01-25 ENCOUNTER — HOME CARE/HOSPICE - HEALTHEAST (OUTPATIENT)
Dept: HOME HEALTH SERVICES | Facility: HOME HEALTH | Age: 66
End: 2021-01-25

## 2021-01-29 ENCOUNTER — OFFICE VISIT - HEALTHEAST (OUTPATIENT)
Dept: FAMILY MEDICINE | Facility: CLINIC | Age: 66
End: 2021-01-29

## 2021-01-29 DIAGNOSIS — Z79.01 LONG TERM CURRENT USE OF ANTICOAGULANT THERAPY: ICD-10-CM

## 2021-01-29 DIAGNOSIS — H72.91 PERFORATION OF RIGHT TYMPANIC MEMBRANE: ICD-10-CM

## 2021-01-29 DIAGNOSIS — I10 ESSENTIAL HYPERTENSION, MALIGNANT: ICD-10-CM

## 2021-02-09 ENCOUNTER — HOME CARE/HOSPICE - HEALTHEAST (OUTPATIENT)
Dept: HOME HEALTH SERVICES | Facility: HOME HEALTH | Age: 66
End: 2021-02-09

## 2021-03-09 ENCOUNTER — AMBULATORY - HEALTHEAST (OUTPATIENT)
Dept: CARDIOLOGY | Facility: CLINIC | Age: 66
End: 2021-03-09

## 2021-03-09 DIAGNOSIS — Z95.810 ICD (IMPLANTABLE CARDIOVERTER-DEFIBRILLATOR) IN PLACE: ICD-10-CM

## 2021-03-09 DIAGNOSIS — I42.0 DILATED CARDIOMYOPATHY (H): ICD-10-CM

## 2021-03-12 ENCOUNTER — HOME CARE/HOSPICE - HEALTHEAST (OUTPATIENT)
Dept: HOME HEALTH SERVICES | Facility: HOME HEALTH | Age: 66
End: 2021-03-12

## 2021-03-25 ENCOUNTER — COMMUNICATION - HEALTHEAST (OUTPATIENT)
Dept: CARDIOLOGY | Facility: CLINIC | Age: 66
End: 2021-03-25

## 2021-03-25 DIAGNOSIS — Z95.5 S/P CORONARY ARTERY STENT PLACEMENT: ICD-10-CM

## 2021-03-25 DIAGNOSIS — N17.9 AKI (ACUTE KIDNEY INJURY) (H): ICD-10-CM

## 2021-03-25 DIAGNOSIS — I25.10 CAD (CORONARY ARTERY DISEASE): ICD-10-CM

## 2021-03-25 RX ORDER — ATORVASTATIN CALCIUM 80 MG/1
80 TABLET, FILM COATED ORAL DAILY
Qty: 90 TABLET | Refills: 0 | Status: SHIPPED | OUTPATIENT
Start: 2021-03-25

## 2021-03-25 RX ORDER — FUROSEMIDE 20 MG
20 TABLET ORAL DAILY
Qty: 90 TABLET | Refills: 0 | Status: SHIPPED | OUTPATIENT
Start: 2021-03-25

## 2021-04-22 ENCOUNTER — AMBULATORY - HEALTHEAST (OUTPATIENT)
Dept: CARDIOLOGY | Facility: CLINIC | Age: 66
End: 2021-04-22

## 2021-04-22 ENCOUNTER — RECORDS - HEALTHEAST (OUTPATIENT)
Dept: ADMINISTRATIVE | Facility: OTHER | Age: 66
End: 2021-04-22

## 2021-04-23 ENCOUNTER — COMMUNICATION - HEALTHEAST (OUTPATIENT)
Dept: CARDIOLOGY | Facility: CLINIC | Age: 66
End: 2021-04-23

## 2021-04-23 DIAGNOSIS — Z95.5 S/P CORONARY ARTERY STENT PLACEMENT: ICD-10-CM

## 2021-04-23 DIAGNOSIS — I25.10 CAD (CORONARY ARTERY DISEASE): ICD-10-CM

## 2021-04-23 RX ORDER — CLOPIDOGREL BISULFATE 75 MG/1
TABLET ORAL
Qty: 90 TABLET | Refills: 2 | Status: SHIPPED | OUTPATIENT
Start: 2021-04-23 | End: 2022-01-18

## 2021-04-26 ENCOUNTER — OFFICE VISIT - HEALTHEAST (OUTPATIENT)
Dept: CARDIOLOGY | Facility: CLINIC | Age: 66
End: 2021-04-26

## 2021-04-26 DIAGNOSIS — Z95.810 ICD (IMPLANTABLE CARDIOVERTER-DEFIBRILLATOR) IN PLACE: ICD-10-CM

## 2021-04-26 DIAGNOSIS — I25.5 ISCHEMIC CARDIOMYOPATHY: ICD-10-CM

## 2021-04-26 DIAGNOSIS — I50.22 CHRONIC SYSTOLIC HEART FAILURE (H): ICD-10-CM

## 2021-04-26 DIAGNOSIS — I48.21 PERMANENT ATRIAL FIBRILLATION (H): ICD-10-CM

## 2021-04-26 DIAGNOSIS — E78.5 DYSLIPIDEMIA, GOAL LDL BELOW 70: ICD-10-CM

## 2021-04-26 RX ORDER — ACETAMINOPHEN 325 MG/1
650 TABLET ORAL EVERY 6 HOURS PRN
Status: SHIPPED | COMMUNITY
Start: 2021-04-26

## 2021-05-26 VITALS
HEART RATE: 80 BPM | TEMPERATURE: 98.9 F | DIASTOLIC BLOOD PRESSURE: 84 MMHG | RESPIRATION RATE: 12 BRPM | OXYGEN SATURATION: 98 % | SYSTOLIC BLOOD PRESSURE: 128 MMHG

## 2021-05-26 VITALS
SYSTOLIC BLOOD PRESSURE: 104 MMHG | OXYGEN SATURATION: 98 % | DIASTOLIC BLOOD PRESSURE: 76 MMHG | RESPIRATION RATE: 18 BRPM | HEART RATE: 77 BPM | TEMPERATURE: 98.3 F

## 2021-05-26 VITALS
HEART RATE: 88 BPM | OXYGEN SATURATION: 96 % | DIASTOLIC BLOOD PRESSURE: 72 MMHG | TEMPERATURE: 98.2 F | RESPIRATION RATE: 18 BRPM | SYSTOLIC BLOOD PRESSURE: 126 MMHG

## 2021-05-26 VITALS
HEART RATE: 68 BPM | SYSTOLIC BLOOD PRESSURE: 121 MMHG | DIASTOLIC BLOOD PRESSURE: 88 MMHG | OXYGEN SATURATION: 98 % | RESPIRATION RATE: 12 BRPM | TEMPERATURE: 98 F

## 2021-05-26 VITALS
TEMPERATURE: 97.5 F | OXYGEN SATURATION: 96 % | HEART RATE: 75 BPM | RESPIRATION RATE: 18 BRPM | SYSTOLIC BLOOD PRESSURE: 130 MMHG | DIASTOLIC BLOOD PRESSURE: 98 MMHG

## 2021-05-26 VITALS
OXYGEN SATURATION: 97 % | HEART RATE: 65 BPM | RESPIRATION RATE: 16 BRPM | TEMPERATURE: 97.8 F | SYSTOLIC BLOOD PRESSURE: 110 MMHG | DIASTOLIC BLOOD PRESSURE: 80 MMHG

## 2021-05-26 VITALS
OXYGEN SATURATION: 98 % | DIASTOLIC BLOOD PRESSURE: 83 MMHG | TEMPERATURE: 98.4 F | SYSTOLIC BLOOD PRESSURE: 124 MMHG | HEART RATE: 79 BPM | RESPIRATION RATE: 12 BRPM

## 2021-05-26 VITALS
DIASTOLIC BLOOD PRESSURE: 78 MMHG | TEMPERATURE: 97.4 F | RESPIRATION RATE: 18 BRPM | OXYGEN SATURATION: 97 % | HEART RATE: 72 BPM | SYSTOLIC BLOOD PRESSURE: 118 MMHG

## 2021-05-26 VITALS
OXYGEN SATURATION: 98 % | SYSTOLIC BLOOD PRESSURE: 145 MMHG | DIASTOLIC BLOOD PRESSURE: 80 MMHG | TEMPERATURE: 95.5 F | RESPIRATION RATE: 12 BRPM | HEART RATE: 68 BPM

## 2021-05-26 VITALS — OXYGEN SATURATION: 97 % | RESPIRATION RATE: 12 BRPM

## 2021-05-26 VITALS
RESPIRATION RATE: 12 BRPM | TEMPERATURE: 98.1 F | SYSTOLIC BLOOD PRESSURE: 123 MMHG | DIASTOLIC BLOOD PRESSURE: 80 MMHG | OXYGEN SATURATION: 98 %

## 2021-05-26 VITALS
DIASTOLIC BLOOD PRESSURE: 81 MMHG | TEMPERATURE: 97.8 F | HEART RATE: 80 BPM | OXYGEN SATURATION: 96 % | SYSTOLIC BLOOD PRESSURE: 120 MMHG | RESPIRATION RATE: 16 BRPM

## 2021-05-26 VITALS
HEART RATE: 72 BPM | DIASTOLIC BLOOD PRESSURE: 82 MMHG | TEMPERATURE: 97.5 F | RESPIRATION RATE: 18 BRPM | OXYGEN SATURATION: 99 % | SYSTOLIC BLOOD PRESSURE: 128 MMHG

## 2021-05-26 VITALS
HEART RATE: 70 BPM | DIASTOLIC BLOOD PRESSURE: 97 MMHG | SYSTOLIC BLOOD PRESSURE: 154 MMHG | TEMPERATURE: 98.6 F | RESPIRATION RATE: 12 BRPM | OXYGEN SATURATION: 98 %

## 2021-05-26 VITALS
DIASTOLIC BLOOD PRESSURE: 93 MMHG | TEMPERATURE: 98.6 F | OXYGEN SATURATION: 98 % | SYSTOLIC BLOOD PRESSURE: 140 MMHG | HEART RATE: 76 BPM | RESPIRATION RATE: 12 BRPM

## 2021-05-26 VITALS
RESPIRATION RATE: 12 BRPM | SYSTOLIC BLOOD PRESSURE: 110 MMHG | DIASTOLIC BLOOD PRESSURE: 85 MMHG | OXYGEN SATURATION: 98 % | TEMPERATURE: 96.8 F | HEART RATE: 78 BPM

## 2021-05-26 VITALS
SYSTOLIC BLOOD PRESSURE: 143 MMHG | HEART RATE: 66 BPM | TEMPERATURE: 96 F | OXYGEN SATURATION: 98 % | DIASTOLIC BLOOD PRESSURE: 99 MMHG | RESPIRATION RATE: 12 BRPM

## 2021-05-26 VITALS
OXYGEN SATURATION: 97 % | SYSTOLIC BLOOD PRESSURE: 117 MMHG | HEART RATE: 74 BPM | RESPIRATION RATE: 12 BRPM | DIASTOLIC BLOOD PRESSURE: 80 MMHG | TEMPERATURE: 98 F

## 2021-05-26 VITALS
DIASTOLIC BLOOD PRESSURE: 90 MMHG | SYSTOLIC BLOOD PRESSURE: 128 MMHG | HEART RATE: 78 BPM | TEMPERATURE: 97.3 F | OXYGEN SATURATION: 96 % | RESPIRATION RATE: 16 BRPM

## 2021-05-26 VITALS
HEART RATE: 100 BPM | TEMPERATURE: 98 F | DIASTOLIC BLOOD PRESSURE: 90 MMHG | RESPIRATION RATE: 12 BRPM | SYSTOLIC BLOOD PRESSURE: 142 MMHG | OXYGEN SATURATION: 98 %

## 2021-05-26 VITALS
SYSTOLIC BLOOD PRESSURE: 142 MMHG | OXYGEN SATURATION: 98 % | DIASTOLIC BLOOD PRESSURE: 90 MMHG | HEART RATE: 70 BPM | TEMPERATURE: 98 F | RESPIRATION RATE: 12 BRPM

## 2021-05-26 VITALS
SYSTOLIC BLOOD PRESSURE: 128 MMHG | RESPIRATION RATE: 12 BRPM | HEART RATE: 71 BPM | TEMPERATURE: 98.7 F | OXYGEN SATURATION: 98 % | DIASTOLIC BLOOD PRESSURE: 88 MMHG

## 2021-05-26 VITALS
RESPIRATION RATE: 14 BRPM | SYSTOLIC BLOOD PRESSURE: 142 MMHG | TEMPERATURE: 96.6 F | DIASTOLIC BLOOD PRESSURE: 97 MMHG | HEART RATE: 60 BPM

## 2021-05-26 VITALS
TEMPERATURE: 99.3 F | DIASTOLIC BLOOD PRESSURE: 93 MMHG | SYSTOLIC BLOOD PRESSURE: 125 MMHG | HEART RATE: 61 BPM | RESPIRATION RATE: 16 BRPM | OXYGEN SATURATION: 96 %

## 2021-05-27 VITALS
SYSTOLIC BLOOD PRESSURE: 128 MMHG | OXYGEN SATURATION: 96 % | RESPIRATION RATE: 16 BRPM | DIASTOLIC BLOOD PRESSURE: 66 MMHG | HEART RATE: 64 BPM | TEMPERATURE: 97.2 F

## 2021-05-27 VITALS
SYSTOLIC BLOOD PRESSURE: 138 MMHG | HEART RATE: 61 BPM | RESPIRATION RATE: 18 BRPM | TEMPERATURE: 97.9 F | OXYGEN SATURATION: 97 % | DIASTOLIC BLOOD PRESSURE: 90 MMHG

## 2021-05-27 VITALS
OXYGEN SATURATION: 97 % | DIASTOLIC BLOOD PRESSURE: 80 MMHG | TEMPERATURE: 97.7 F | SYSTOLIC BLOOD PRESSURE: 134 MMHG | RESPIRATION RATE: 18 BRPM | HEART RATE: 60 BPM

## 2021-05-27 VITALS
TEMPERATURE: 95.8 F | SYSTOLIC BLOOD PRESSURE: 121 MMHG | OXYGEN SATURATION: 98 % | HEART RATE: 64 BPM | DIASTOLIC BLOOD PRESSURE: 80 MMHG | RESPIRATION RATE: 12 BRPM

## 2021-05-27 VITALS
TEMPERATURE: 97.8 F | SYSTOLIC BLOOD PRESSURE: 141 MMHG | OXYGEN SATURATION: 96 % | DIASTOLIC BLOOD PRESSURE: 88 MMHG | HEART RATE: 70 BPM | RESPIRATION RATE: 16 BRPM

## 2021-05-27 VITALS
HEART RATE: 69 BPM | OXYGEN SATURATION: 99 % | RESPIRATION RATE: 16 BRPM | DIASTOLIC BLOOD PRESSURE: 98 MMHG | SYSTOLIC BLOOD PRESSURE: 134 MMHG | TEMPERATURE: 98 F

## 2021-05-27 VITALS
SYSTOLIC BLOOD PRESSURE: 129 MMHG | OXYGEN SATURATION: 98 % | HEART RATE: 70 BPM | DIASTOLIC BLOOD PRESSURE: 97 MMHG | TEMPERATURE: 96 F | RESPIRATION RATE: 12 BRPM

## 2021-05-27 VITALS
OXYGEN SATURATION: 97 % | RESPIRATION RATE: 16 BRPM | DIASTOLIC BLOOD PRESSURE: 86 MMHG | SYSTOLIC BLOOD PRESSURE: 121 MMHG | TEMPERATURE: 98.1 F | HEART RATE: 76 BPM

## 2021-05-27 VITALS
TEMPERATURE: 98.7 F | RESPIRATION RATE: 12 BRPM | DIASTOLIC BLOOD PRESSURE: 80 MMHG | OXYGEN SATURATION: 97 % | SYSTOLIC BLOOD PRESSURE: 110 MMHG | HEART RATE: 70 BPM

## 2021-05-27 VITALS
TEMPERATURE: 98.1 F | RESPIRATION RATE: 14 BRPM | HEART RATE: 78 BPM | OXYGEN SATURATION: 98 % | SYSTOLIC BLOOD PRESSURE: 132 MMHG | DIASTOLIC BLOOD PRESSURE: 84 MMHG

## 2021-05-27 VITALS
HEART RATE: 65 BPM | OXYGEN SATURATION: 97 % | DIASTOLIC BLOOD PRESSURE: 92 MMHG | SYSTOLIC BLOOD PRESSURE: 131 MMHG | TEMPERATURE: 97.9 F | RESPIRATION RATE: 16 BRPM

## 2021-05-27 VITALS
DIASTOLIC BLOOD PRESSURE: 88 MMHG | TEMPERATURE: 96.8 F | OXYGEN SATURATION: 98 % | RESPIRATION RATE: 18 BRPM | HEART RATE: 67 BPM | SYSTOLIC BLOOD PRESSURE: 114 MMHG

## 2021-05-27 VITALS
OXYGEN SATURATION: 97 % | TEMPERATURE: 98.4 F | DIASTOLIC BLOOD PRESSURE: 78 MMHG | SYSTOLIC BLOOD PRESSURE: 124 MMHG | RESPIRATION RATE: 18 BRPM | HEART RATE: 72 BPM

## 2021-05-27 VITALS
OXYGEN SATURATION: 97 % | HEART RATE: 73 BPM | TEMPERATURE: 97.4 F | SYSTOLIC BLOOD PRESSURE: 124 MMHG | RESPIRATION RATE: 18 BRPM | DIASTOLIC BLOOD PRESSURE: 78 MMHG

## 2021-05-27 VITALS
RESPIRATION RATE: 14 BRPM | DIASTOLIC BLOOD PRESSURE: 86 MMHG | SYSTOLIC BLOOD PRESSURE: 118 MMHG | TEMPERATURE: 96.7 F | HEART RATE: 57 BPM

## 2021-05-27 VITALS
SYSTOLIC BLOOD PRESSURE: 125 MMHG | OXYGEN SATURATION: 98 % | RESPIRATION RATE: 12 BRPM | TEMPERATURE: 97.7 F | DIASTOLIC BLOOD PRESSURE: 86 MMHG | HEART RATE: 66 BPM

## 2021-05-27 VITALS
DIASTOLIC BLOOD PRESSURE: 80 MMHG | SYSTOLIC BLOOD PRESSURE: 126 MMHG | OXYGEN SATURATION: 97 % | RESPIRATION RATE: 18 BRPM | TEMPERATURE: 97.3 F | HEART RATE: 60 BPM

## 2021-05-27 VITALS
HEART RATE: 70 BPM | OXYGEN SATURATION: 98 % | RESPIRATION RATE: 18 BRPM | DIASTOLIC BLOOD PRESSURE: 78 MMHG | TEMPERATURE: 98.1 F | SYSTOLIC BLOOD PRESSURE: 118 MMHG

## 2021-05-27 VITALS
HEART RATE: 60 BPM | TEMPERATURE: 97.9 F | SYSTOLIC BLOOD PRESSURE: 136 MMHG | DIASTOLIC BLOOD PRESSURE: 96 MMHG | OXYGEN SATURATION: 98 % | RESPIRATION RATE: 16 BRPM

## 2021-05-27 VITALS
RESPIRATION RATE: 16 BRPM | SYSTOLIC BLOOD PRESSURE: 144 MMHG | DIASTOLIC BLOOD PRESSURE: 100 MMHG | HEART RATE: 80 BPM | TEMPERATURE: 97.3 F | OXYGEN SATURATION: 96 %

## 2021-05-29 NOTE — PROGRESS NOTES
In clinic device check with Dr. Russo.  Please see link for full device report.  Patient was informed of results and next follow up during today's visit.  Type: In clinic ICD check. Seen with Dr. Russo.   Presenting: Atrial fibrillation with VS 70-130s  Lead/Battery Status: Stable.   Atrial Arrhythmias: 2.4K mode switches, burden 100%. V-rates >/=120bpm 40%.   Vent Arrhythmias: 624 NSVT and 1K other untreated episodes. All EGMs show AF with RVR. Most recent treated episode was 4/1/2019 showing AF with RVR 200bpm, ATP x1, unsuccessful.   Anticoagulant: Xarelto.   Comments: Normal device function. No changes made. Dr. Russo made aware of device findings. BK    Reviewed fast heart rates with patient and medication compliance.   Pt reports he often misses his am doses and pm doses of medications.   He didn't seem to realize the importance of them.   Discussed the device treating his RVR and the possibility of resulting in an inappropriate shock.   His zones include a VT zone at 185 with ATP and shocks and a VF zone at 240. His RVR gets up to 200-210bpm at times.       During discussion, he noted his primary MD did a xray for a cough that he had and noted a slightly kinked device wire.     Leads are stable today. Routed to Dr. Rivera for his notification and to review the xray and review of device parameters to see if any therapy zone changes should be made.     Kim Hunt RN BSN PHN  Device Nurse   [facility]

## 2021-05-29 NOTE — PROGRESS NOTES
Reviewed fast heart rates with patient and medication compliance.   Pt reports he often misses his am doses and pm doses of medications.   He didn't seem to realize the importance of them.   Discussed the device treating his RVR and the possibility of resulting in an inappropriate shock.   His zones include a VT zone at 185 with ATP and shocks and a VF zone at 240. His RVR gets up to 200-210bpm at times.     Chest x-raywas reviewed   is satisfactory  Lead function satisfactory  Defer A. fib management to Dr. Russo

## 2021-05-29 NOTE — PROGRESS NOTES
[facility] Progress Note    Assessment:  1.  Ischemic cardiomyopathy functional class II with multivessel stenting in April 2018.  He describes no anginal chest discomfort.  He did have some documentation of improvement in systolic function and we are going to plan repeat echocardiogram to reevaluate left ventricular function.    2.  Atrial fibrillation.  Device interrogation today does demonstrate at times higher heart rates.  There has been some difficulty with him taking the metoprolol on a regular basis.  We discussed this at length.  I am going to ask that my nurse try to contact the nurse that sets out his medication so that we can try to make a more complete solution.  When he was seen in the atrial fibrillation clinic the metoprolol was changed to bedtime dosing secondary to fatigue but need to be taken during the day.    3.  Dyslipidemia.  LDL cholesterol and lipids recently obtained through his primary care physician appeared at goal.  He is currently on 80 mg of atorvastatin.      Plan: As outlined above with follow-up in 1 month and CHF clinic pending echocardiogram and discussion with his nurse who puts out his medications.    1. CAD (coronary artery disease)  Echo Complete   2. Chronic kidney disease (CKD), stage III (moderate) (H)     3. Dilated cardiomyopathy (H)     4. Persistent atrial fibrillation (H)     5. Coronary artery disease involving native coronary artery of native heart without angina pectoris     6. Chronic systolic heart failure (H)     7. Dyslipidemia, goal LDL below 70     8. ICD (implantable cardioverter-defibrillator) in place, dual chamber           An After Visit Summary was printed and given to the patient.    Subjective:    Dillon Pennington is a 64 y.o. male who returned for a planned  follow up visit.  I am pleased to hear that he is been feeling very well.  He states that he has had no complaints of chest discomfort.  He has been active and can climb a flight of  stairs without specific difficulty and reports he is feeling much better than he had been previously.  I have reviewed recent laboratory studies from his primary care physician where his mean was 1.58.  This is improved.  Cholesterol 122 LDL 55, normal CBC, BUN 31, creatinine 1.58, sodium 143, potassium 3.9, chloride 108, CO2 22.    He has a history of ischemic cardia myopathy and underwent prior ICD implantation and coronary artery disease with PCI to diagonal/mid LAD/circumflex in April 2018.  He has been on a combination of Plavix and Xarelto.  He has had no shoes with respect to bleeding.    One primary issue is that he has not been taking his metoprolol as regularly.  This is been changed to evening dosing and we spent some time discussing the importance of taking the metoprolol.    Ice interrogation does demonstrate some occasional more rapid atrial fibrillation events we related this to the reasons why taking the metoprolol is so important.  Reviewed all of this with the help of the .    Review of Systems:   General: WNL  Eyes: WNL  Ears/Nose/Throat: Hearing Loss  Lungs: Cough, Wheezing  Heart: WNL  Stomach: WNL  Bladder: WNL  Muscle/Joints: Joint Pain  Skin: WNL  Nervous System: Daytime Sleepiness  Mental Health: WNL     Blood: WNL      Problem List:    Patient Active Problem List   Diagnosis     Chronic kidney disease (CKD), stage III (moderate) (H)     Chronic systolic heart failure (H)     Dilated cardiomyopathy (H)     Persistent atrial fibrillation (H)     VF (ventricular fibrillation) (H)     Coronary artery disease involving native coronary artery of native heart without angina pectoris     Ventricular tachycardia (H)     Anemia     Dyslipidemia, goal LDL below 70     ICD (implantable cardioverter-defibrillator) in place, dual chamber       Social History     Socioeconomic History     Marital status:      Spouse name: Soto Rivera     Number of children: Not on file     Years of  education: Not on file     Highest education level: Not on file   Occupational History     Not on file   Social Needs     Financial resource strain: Not on file     Food insecurity:     Worry: Not on file     Inability: Not on file     Transportation needs:     Medical: Not on file     Non-medical: Not on file   Tobacco Use     Smoking status: Never Smoker     Smokeless tobacco: Never Used   Substance and Sexual Activity     Alcohol use: Yes     Comment: very rare      Drug use: No     Sexual activity: Not on file   Lifestyle     Physical activity:     Days per week: Not on file     Minutes per session: Not on file     Stress: Not on file   Relationships     Social connections:     Talks on phone: Not on file     Gets together: Not on file     Attends Denominational service: Not on file     Active member of club or organization: Not on file     Attends meetings of clubs or organizations: Not on file     Relationship status: Not on file     Intimate partner violence:     Fear of current or ex partner: Not on file     Emotionally abused: Not on file     Physically abused: Not on file     Forced sexual activity: Not on file   Other Topics Concern     Not on file   Social History Narrative    The patient's son was present to provide the HPI.       No family history on file.    Current Outpatient Medications   Medication Sig Dispense Refill     allopurinol (ZYLOPRIM) 300 MG tablet Take 300 mg by mouth daily.       atorvastatin (LIPITOR) 80 MG tablet Take 1 tablet (80 mg total) by mouth daily. 90 tablet 3     cetirizine (ZYRTEC) 10 MG tablet Take 10 mg by mouth daily.  1     cholecalciferol, vitamin D3, (VITAMIN D3) 5,000 unit Tab Take 5,000 Units by mouth Daily at 5 pm.       clopidogrel (PLAVIX) 75 mg tablet Take 1 tablet (75 mg total) by mouth daily. 90 tablet 3     furosemide (LASIX) 20 MG tablet Take 1 tablet (20 mg total) by mouth daily. 90 tablet 3     hydrALAZINE (APRESOLINE) 50 MG tablet Take 1 tablet (50 mg total) by  "mouth 3 (three) times a day. 270 tablet 3     isosorbide mononitrate (IMDUR) 30 MG 24 hr tablet Take 1 tablet (30 mg total) by mouth daily. 90 tablet 3     levothyroxine (SYNTHROID) 88 MCG tablet Take 88 mcg by mouth Daily at 6:00 am. Indications: hypothyroidism       metoprolol succinate (TOPROL-XL) 100 MG 24 hr tablet Take 3 tablets (300 mg total) by mouth every evening. 270 tablet 3     nitroglycerin (NITROSTAT) 0.4 MG SL tablet Place 1 tablet (0.4 mg total) under the tongue every 5 (five) minutes as needed for chest pain. 1 Bottle 3     rivaroxaban 15 mg Tab Take 1 tablet (15 mg total) by mouth daily with supper. 30 tablet 3     acetaminophen (TYLENOL EXTRA STRENGTH) 500 MG tablet Take 500 mg by mouth every 6 (six) hours as needed for pain. Indications: Pain       acetaminophen-codeine (TYLENOL #3) 300-30 mg per tablet Take 1 tablet by mouth every 6 (six) hours as needed for pain. Take 1 pill every 6 hours as needed for pain.       No current facility-administered medications for this visit.        Objective:     /86 (Patient Site: Left Arm, Patient Position: Sitting, Cuff Size: Adult Regular)   Pulse 62   Resp 22   Ht 5' 2\" (1.575 m)   Wt 165 lb (74.8 kg)   BMI 30.18 kg/m    165 lb (74.8 kg)   160 2018    Physical Exam:    GENERAL APPEARANCE: alert, no apparent distress  HEENT: no scleral icterus or xanthelasma  NECK: jugular venous pressure is within normal limits.  CHEST: symmetric, the lungs are clear to auscultation, device appears well-healed.  CARDIOVASCULAR: Irregular, soft systolic murmur; no carotid bruits  Abdomen: No Organomegaly, masses, bruits, or tenderness. Bowels sounds are present      EXTREMITIES: no cyanosis, clubbing or edema    Cardiac Testing:  Patient Information     Patient Name  Dillon Pennington MRN  [id number] Sex  Male              Age  1955 (64 y.o.)   Indications     Ischemic heart disease   Dx: Ischemic cardiomyopathy [I25.5 (ICD-10-CM)]   Summary "       Left ventricular systolic function is moderately decreased with an LVEF of 30-35%    Normal right ventricular size and systolic function.    ICD lead is present in the ventricle.    Mild to moderate mitral regurgitation.    When compared to the previous study dated 4/20/2018, the left ventricular systolic function has increased from 20 to 30-35% and the degre of mitral regurgitation has decreased   Conclusion          Estimated blood loss was <20 ml.    1st Diag lesion 85% stenosed.    A drug eluting stent was successfully placed.    Mid LAD lesion 99% stenosed.    A drug eluting stent was successfully placed.    Mid Cx lesion 90% stenosed.    A stent was successfully placed.    The LM vessel was moderate.     Pt with ischemic CM CKD with long discussion with hte family today re CABG vs PCI and family wished to have PCI     Procedure performed with Dr. Cheung and myself     Access:  Left radial for coronary access  Right femoral access with US preclose placed 14 F and impella insertion prior to PCI     Angiography:  LM min dz ostium  LAD severe dz bolivar glesion  Mid and in diagonal, far distal severe diffuse dz  Circ mid severe dz     PCI:  1. Deployed two DEJUAN to diagonal  2. Deployed two DEJUAN to mid LAD (long lesion), post IVUS confirmed aposition  3. Deployed DEJUAN to mid Circ     Removed impella with use perclose sutures with 3+ dorsalis pulse post     Imp/plan:  1. Ischemic CM - s/p PCI LAD/Circ with DEJUAN - asp/plavix, stop heparin, carvedilol, statin (titrate up carvedilol as tolerated)  2. VT/VF - possible polymorphic VT from ischemic and/or low magnesium on amiodarone with preexisiting risk - discuss with EP re secondary prevention with ICD  3. Afib - in SR on amiodoane, cont for now iv over night then change to oral tomorrow.  4. MR - moderate - repeat echo   5. Severe CKD - 202mL iodixanol -cont hydration today,nephrology following              Lab Results:    Lab Results   Component Value Date      08/17/2018    K 4.4 08/17/2018     (H) 08/17/2018    CO2 25 08/17/2018    BUN 32 (H) 08/17/2018    CREATININE 1.78 (H) 08/17/2018    CALCIUM 9.0 08/17/2018     Lab Results   Component Value Date    CHOL 156 08/17/2018    TRIG 201 (H) 08/17/2018    HDL 43 08/17/2018     BNP (pg/mL)   Date Value   05/16/2018 81 (H)   04/17/2018 335 (H)   04/12/2018 956 (H)     Creatinine (mg/dL)   Date Value   08/17/2018 1.78 (H)   05/22/2018 2.09 (H)   05/16/2018 2.02 (H)   04/25/2018 1.71 (H)     LDL Calculated (mg/dL)   Date Value   08/17/2018 73   04/14/2018 129     Lab Results   Component Value Date    WBC 8.6 05/16/2018    HGB 11.5 (L) 05/16/2018    HCT 34.3 (L) 05/16/2018    MCV 90 05/16/2018     05/16/2018               This note has been dictated using voice recognition software. Any grammatical or context distortions are unintentional and inherent to the software.      Parth Russo M.D., F.A.C.C.  [facility]  [phone]

## 2021-05-29 NOTE — PATIENT INSTRUCTIONS - HE
Please try to remember to take your medicine as scheduled.We talked about how important it is to take the metoprolol in the evening and to take your medications.We are going to plan a heart ultrasound and will call with results.My nurse is Viji and her number is [phone]

## 2021-05-29 NOTE — TELEPHONE ENCOUNTER
----- Message -----  From: Liana Guillen RN  Sent: 5/29/2019   3:08 PM  To: Viji Marks RN, Parth Russo MD  Subject: RE:                                              Will call out specific assessement of metoprolol compliance. Unlikely that he is missing it unless he intentionally removes it and discards it when he takes his other evvening meds. I will keep you up to date if he is not taking it consistently.  Thanks  Tameka

## 2021-05-29 NOTE — TELEPHONE ENCOUNTER
----- Message from Parth Russo MD sent at 5/28/2019  8:01 PM CDT -----  Regarding: FW:  Thanks for the update, the only medicine that he states he forgets is the metoprolol. Can we please track it?  Thanks  MAYCO Russo  ----- Message -----  From: Liana Guillen RN  Sent: 5/28/2019   3:28 PM  To: Viji Marks RN, Parth Russo MD  Subject: RE:                                              1) Allopurinol 300mg QAM (INCREASED FROM 50 QD ON 3/11)   2) Atorvastatin 80mg QHS  3) Plavix 75mg QAM  4) Lasix 20mg QAM  5) Hydralazine 50mg AM, NOON, and HS  6) Imdur 30mg QAM  7) Levothyrozine 88mcg QAM   9) Metoprolol 300mg Q HS - 300mg (3) tabs QHS  10) rivaroxaban 15mg HS   11) VIT D3 - 5 000 u daily - NEW RX 3/11     Medications are set up every 2 weeks as note above. Patient does not often miss medications at all. Usually compliant unless he is dumping his med box before we arrive. The past month has been a problem because his primary DrNanda Farah decided that she did not think he needed any further homecare and refused ongoing orders. Patient waited a week and finally got an appointment and she approved restarting it. In the midst of all that confusion- he did go about 3 weeks without assistance from nursing with managing his medications. Should be all straightened out now. Let me know if there are any further questions.  Tameka Guillen RN  ----- Message -----  From: Viji Marks RN  Sent: 5/28/2019  11:19 AM  To: AMBER Portillo,    Are you still following this patient?  Dr Russo would like to clarify what dose of metoprolol pt is taking because pt was unsure and told Dr Russo that he sometimes forgets the evening dose.    Thank you,    Viji Marks RN for Dr Russo.    ----- Message -----  From: Parth Russo MD  Sent: 5/24/2019   9:44 AM  To: Viji Marks RN    Can we touch base with the nurse that sets up his medicines so we can confirm what he is taking. ? How much  metoprolol.He apparently forgets the metoprolol in the evening. Can we talk to her to get some insight.ty mdg

## 2021-05-30 ENCOUNTER — RECORDS - HEALTHEAST (OUTPATIENT)
Dept: ADMINISTRATIVE | Facility: CLINIC | Age: 66
End: 2021-05-30

## 2021-05-30 NOTE — PATIENT INSTRUCTIONS - HE
Dillon Pennington,    It was a pleasure to see you today at the [facility].     My recommendations after this visit include:  - Please follow up with Dr Russo in September    - Please follow up with Marion Blunt in 6 months  - No changes to your medications    Marion Blunt, CNP

## 2021-06-01 VITALS — HEIGHT: 62 IN | BODY MASS INDEX: 26.5 KG/M2 | WEIGHT: 144 LBS

## 2021-06-01 VITALS
BODY MASS INDEX: 26.05 KG/M2 | BODY MASS INDEX: 26.05 KG/M2 | BODY MASS INDEX: 26.05 KG/M2 | HEIGHT: 64 IN | WEIGHT: 152.6 LBS | HEIGHT: 64 IN | WEIGHT: 152.6 LBS | WEIGHT: 152.6 LBS | HEIGHT: 64 IN | WEIGHT: 152.6 LBS | HEIGHT: 64 IN | BODY MASS INDEX: 26.05 KG/M2

## 2021-06-01 VITALS — WEIGHT: 157 LBS | BODY MASS INDEX: 27.82 KG/M2 | HEIGHT: 63 IN

## 2021-06-01 VITALS — HEIGHT: 64 IN | BODY MASS INDEX: 25.95 KG/M2 | WEIGHT: 152 LBS

## 2021-06-01 VITALS — WEIGHT: 146.8 LBS | BODY MASS INDEX: 25.2 KG/M2

## 2021-06-01 VITALS — HEIGHT: 63 IN | BODY MASS INDEX: 27.82 KG/M2 | WEIGHT: 157 LBS

## 2021-06-01 VITALS — WEIGHT: 166 LBS | HEIGHT: 64 IN | BODY MASS INDEX: 28.34 KG/M2

## 2021-06-01 VITALS — HEIGHT: 64 IN | BODY MASS INDEX: 25.03 KG/M2 | WEIGHT: 146.6 LBS

## 2021-06-01 VITALS — WEIGHT: 152 LBS | HEIGHT: 64 IN | BODY MASS INDEX: 25.95 KG/M2

## 2021-06-01 VITALS — BODY MASS INDEX: 26.26 KG/M2 | HEIGHT: 64 IN | WEIGHT: 153.8 LBS

## 2021-06-01 VITALS — HEIGHT: 63 IN | WEIGHT: 154.6 LBS | BODY MASS INDEX: 27.39 KG/M2

## 2021-06-01 VITALS — WEIGHT: 149 LBS | BODY MASS INDEX: 25.58 KG/M2

## 2021-06-01 VITALS — BODY MASS INDEX: 26.68 KG/M2 | HEIGHT: 62 IN | WEIGHT: 145 LBS

## 2021-06-01 NOTE — TELEPHONE ENCOUNTER
FLP / AST / ALT ordered - to be drawn at nephrology appt in October.    Hydralazine decreased to 25 mg three times a day.    Updates sent to RN Bree Acevedo.  Recommendations also called to Bree.  -yovany

## 2021-06-01 NOTE — PATIENT INSTRUCTIONS - HE
Please call my nurse Viji with any questions.Please monitor for chest discomfort, dizziness or shortness of breath with this change.Her number is [phone].Please have your blood pressure monitored and call with results.  Please decrease the hydralazine to 25mg three times a day instead of 50mg.Please continue with the current dose of metoprolol at 300mg

## 2021-06-01 NOTE — TELEPHONE ENCOUNTER
----- Message from Parth Russo MD sent at 9/10/2019  1:39 PM CDT -----  Can we please call his nurse Bree P [phone] and have her decrease the hydpralazine to 25mg three times a day and monitor blood pressure.MAYCO Russo    ----- Message from Parth Russo MD sent at 9/10/2019  1:26 PM CDT -----  He is going to there kidney doctor in October and likely will have blood work, id like to have a fasting lipid panel, ast and alt at that blood draw.Can we arrange? He knows that the kidney doctor is on [facility].The nurse that arranges his pills isbree,p [phone]

## 2021-06-01 NOTE — PROGRESS NOTES
[facility] Progress Note    Assessment:  1.  Ischemic cardia myopathy functional class II.  Multivessel stenting April 2018.  No complaints of anginal chest discomfort.  He remains on Plavix in combination with Xarelto.  Potentially could consider changing to aspirin but he is tolerating this combination.  Echocardiogram from a number of months ago revealed an ejection fraction of 33%.  mild to moderate mitral regurgitation.  April 2018 ejection fraction was as low as 20%.    2.  Atrial fibrillation.  Device interrogation continues to show some increased heart rate.  He is on 300 mg of metoprolol XL.  I would be hesitant to decrease the dose given the increased rate and will correspond with my colleague from  Dr. Rivera.    3.  Mild low blood pressure.  No associated symptoms.  I am going to ask that the hydralazine be decreased to 25 mg 3 times daily.  I have asked that he return to the clinic in 1 to 2 weeks to follow-up on his blood pressure.      4.  Dyslipidemia.  Total cholesterol 122 and LDL of 54 at his primary care physician's office May 2019.  At that time liver function tests were within normal limits and creatinine was stable at 1.58.    5.  Renal insufficiency.  He does have follow-up appointment scheduled with renal in the next few weeks.  He indicates that he will be having laboratory studies at that time.      Plan: Decrease hydralazine to 25 mg 3 times daily.          Follow up  7 to 10 days with nurse practitioner to follow-up on blood pressure.    1. Dilated cardiomyopathy (H)     2. Persistent atrial fibrillation (H)     3. Coronary artery disease involving native coronary artery of native heart without angina pectoris     4. Dyslipidemia, goal LDL below 70           An After Visit Summary was printed and given to the patient.    Subjective:    Dillon Pennington is a 64 y.o. male who returned for a planned  follow up visit.  He is seen with the help of the .  He is been  feeling well.  He specifically denies dizziness, chest discomfort, shortness of breath, orthopnea or PND.  He reports mild shortness of breath if he is walking briskly but otherwise feels very well.  His blood pressure was noted to be a little low here today.  His medications are set up through a nurse who arranges and monitors his medications.  He is on a high dose of metoprolol 300 mg daily which was recommended when he was seen in the atrial fibrillation clinic because of a persistent elevated heart rate and atrial fibrillation.  He had some inappropriate shock with higher heart rate therefore I am hesitant to decrease the metoprolol.  Recent device check demonstrates atrial fibrillation burden to be 100% with heart rates elevated approximately 25% of the time with 2 episodes of ventricular tachycardia.  An echocardiogram completed a few months ago demonstrated an ejection fraction of 33%.    He has a history of ischemic cardia myopathy with prior ICD implantation and coronary artery disease with PCI to a diagonal, mid LAD and circumflex in April 2018.  He has been on a combination of Plavix and Xarelto.  He does have underlying renal insufficiency and these medications will need to be monitored closely.  I note that in May 2019 he had a creatinine through his primary care physician of 1.58.    Review of Systems:   General: WNL  Eyes: WNL  Ears/Nose/Throat: WNL  Lungs: Shortness of Breath  Heart: WNL  Stomach: WNL  Bladder: WNL  Muscle/Joints: WNL  Skin: WNL  Nervous System: WNL  Mental Health: WNL     Blood: WNL      Problem List:    Patient Active Problem List   Diagnosis     Chronic kidney disease (CKD), stage III (moderate) (H)     Chronic systolic heart failure (H)     Dilated cardiomyopathy (H)     Persistent atrial fibrillation (H)     VF (ventricular fibrillation) (H)     Coronary artery disease involving native coronary artery of native heart without angina pectoris     Ventricular tachycardia (H)      Anemia     Dyslipidemia, goal LDL below 70     ICD (implantable cardioverter-defibrillator) in place, dual chamber       Social History     Socioeconomic History     Marital status:      Spouse name: Soto Rivera     Number of children: Not on file     Years of education: Not on file     Highest education level: Not on file   Occupational History     Not on file   Social Needs     Financial resource strain: Not on file     Food insecurity:     Worry: Not on file     Inability: Not on file     Transportation needs:     Medical: Not on file     Non-medical: Not on file   Tobacco Use     Smoking status: Never Smoker     Smokeless tobacco: Never Used   Substance and Sexual Activity     Alcohol use: Yes     Comment: very rare      Drug use: No     Sexual activity: Not on file   Lifestyle     Physical activity:     Days per week: Not on file     Minutes per session: Not on file     Stress: Not on file   Relationships     Social connections:     Talks on phone: Not on file     Gets together: Not on file     Attends Religion service: Not on file     Active member of club or organization: Not on file     Attends meetings of clubs or organizations: Not on file     Relationship status: Not on file     Intimate partner violence:     Fear of current or ex partner: Not on file     Emotionally abused: Not on file     Physically abused: Not on file     Forced sexual activity: Not on file   Other Topics Concern     Not on file   Social History Narrative    The patient's son was present to provide the HPI.       No family history on file.    Current Outpatient Medications   Medication Sig Dispense Refill     acetaminophen (TYLENOL EXTRA STRENGTH) 500 MG tablet Take 500 mg by mouth every 6 (six) hours as needed for pain. Indications: Pain       allopurinol (ZYLOPRIM) 300 MG tablet Take 300 mg by mouth daily.       atorvastatin (LIPITOR) 80 MG tablet Take 1 tablet (80 mg total) by mouth daily. 90 tablet 3     cholecalciferol,  "vitamin D3, (VITAMIN D3) 5,000 unit Tab Take 5,000 Units by mouth Daily at 5 pm.       clopidogrel (PLAVIX) 75 mg tablet Take 1 tablet (75 mg total) by mouth daily. 90 tablet 3     furosemide (LASIX) 20 MG tablet Take 1 tablet (20 mg total) by mouth daily. 90 tablet 3     hydrALAZINE (APRESOLINE) 50 MG tablet Take 1 tablet (50 mg total) by mouth 3 (three) times a day. 270 tablet 3     isosorbide mononitrate (IMDUR) 30 MG 24 hr tablet Take 1 tablet (30 mg total) by mouth daily. 90 tablet 3     levothyroxine (SYNTHROID) 88 MCG tablet Take 88 mcg by mouth Daily at 6:00 am. Indications: hypothyroidism       metoprolol succinate (TOPROL-XL) 100 MG 24 hr tablet Take 3 tablets (300 mg total) by mouth every evening. 270 tablet 3     nitroglycerin (NITROSTAT) 0.4 MG SL tablet Place 1 tablet (0.4 mg total) under the tongue every 5 (five) minutes as needed for chest pain. 1 Bottle 3     rivaroxaban 15 mg Tab Take 1 tablet (15 mg total) by mouth daily with supper. 30 tablet 3     No current facility-administered medications for this visit.        Objective:     BP 98/62 (Patient Site: Left Arm, Patient Position: Sitting, Cuff Size: Adult Regular)   Pulse 64   Resp 12   Ht 5' 2\" (1.575 m)   Wt 169 lb (76.7 kg)   BMI 30.91 kg/m    169 lb (76.7 kg)   165 pounds May 2019    Physical Exam:    GENERAL APPEARANCE: alert, no apparent distress  HEENT: no scleral icterus or xanthelasma  NECK: jugular venous pressure is not elevated.  CHEST: symmetric, the lungs are clear to auscultation, device appears well-healed  CARDIOVASCULAR: Irregular rhythm, soft systolic murmur, no appreciated carotid bruit  Abdomen: No Organomegaly, masses, bruits, or tenderness. Bowels sounds are present      EXTREMITIES: no cyanosis, clubbing or edema    Cardiac Testing:  cho Complete   Order# [id number]   Reading physician: Sawyer Ortiz MD Ordering physician: Parth Russo MD Study date: 6/12/19   Performing Date Performing Department   Jun 12, " 2019  CARDIAC TESTING [[id number]]    HE  SERVICES [[id number]]   Patient Information     Patient Name  Dillon Pennington MRN  [id number] Sex  Male              Age  1955 (64 y.o.)   Indications     Dx: CAD (coronary artery disease) [I25.10 (ICD-10-CM)]   Summary       When compared to the previous study dated 2018, no significant change.    Left ventricle ejection fraction is moderately decreased. The calculated left ventricular ejection fraction is 33%.    Normal left ventricular size.    Normal right ventricular size and systolic function.    Mild to moderate mitral regurgitation    Device lead in right heart chambers      Conclusion          Estimated blood loss was <20 ml.    1st Diag lesion 85% stenosed.    A drug eluting stent was successfully placed.    Mid LAD lesion 99% stenosed.    A drug eluting stent was successfully placed.    Mid Cx lesion 90% stenosed.    A stent was successfully placed.    The LM vessel was moderate.     Pt with ischemic CM CKD with long discussion with hte family today re CABG vs PCI and family wished to have PCI     Procedure performed with Dr. Cheung and myself     Access:  Left radial for coronary access  Right femoral access with US preclose placed 14 F and impella insertion prior to PCI     Angiography:  LM min dz ostium  LAD severe dz bolivar glesion  Mid and in diagonal, far distal severe diffuse dz  Circ mid severe dz     PCI:  1. Deployed two DEJUAN to diagonal  2. Deployed two DEJUAN to mid LAD (long lesion), post IVUS confirmed aposition  3. Deployed DEJUAN to mid Circ     Removed impella with use perclose sutures with 3+ dorsalis pulse post     Imp/plan:  1. Ischemic CM - s/p PCI LAD/Circ with DEJUAN - asp/plavix, stop heparin, carvedilol, statin (titrate up carvedilol as tolerated)  2. VT/VF - possible polymorphic VT from ischemic and/or low magnesium on amiodarone with preexisiting risk - discuss with EP re secondary prevention with ICD  3. Afib - in SR on  amiodoane, cont for now iv over night then change to oral tomorrow.  4. MR - moderate - repeat echo   5. Severe CKD - 202mL iodixanol -cont hydration today,nephrology following               Lab Results:    Lab Results   Component Value Date     08/17/2018    K 4.4 08/17/2018     (H) 08/17/2018    CO2 25 08/17/2018    BUN 32 (H) 08/17/2018    CREATININE 1.78 (H) 08/17/2018    CALCIUM 9.0 08/17/2018     Lab Results   Component Value Date    CHOL 156 08/17/2018    TRIG 201 (H) 08/17/2018    HDL 43 08/17/2018     BNP (pg/mL)   Date Value   05/16/2018 81 (H)   04/17/2018 335 (H)   04/12/2018 956 (H)     Creatinine (mg/dL)   Date Value   08/17/2018 1.78 (H)   05/22/2018 2.09 (H)   05/16/2018 2.02 (H)   04/25/2018 1.71 (H)     LDL Calculated (mg/dL)   Date Value   08/17/2018 73   04/14/2018 129     Lab Results   Component Value Date    WBC 8.6 05/16/2018    HGB 11.5 (L) 05/16/2018    HCT 34.3 (L) 05/16/2018    MCV 90 05/16/2018     05/16/2018               This note has been dictated using voice recognition software. Any grammatical or context distortions are unintentional and inherent to the software.      Parth Russo M.D., F.A.C.C.  [facility]  [phone]

## 2021-06-02 VITALS — BODY MASS INDEX: 30.18 KG/M2 | WEIGHT: 165 LBS

## 2021-06-02 VITALS — BODY MASS INDEX: 27.29 KG/M2 | WEIGHT: 159 LBS

## 2021-06-02 VITALS — HEIGHT: 62 IN | BODY MASS INDEX: 30.36 KG/M2 | WEIGHT: 165 LBS

## 2021-06-02 VITALS — WEIGHT: 162 LBS | BODY MASS INDEX: 27.66 KG/M2 | HEIGHT: 64 IN

## 2021-06-02 VITALS — HEIGHT: 64 IN | BODY MASS INDEX: 27.66 KG/M2 | WEIGHT: 162 LBS

## 2021-06-02 VITALS — BODY MASS INDEX: 27.98 KG/M2 | WEIGHT: 163 LBS

## 2021-06-02 VITALS — BODY MASS INDEX: 27.14 KG/M2 | WEIGHT: 159 LBS | HEIGHT: 64 IN

## 2021-06-02 VITALS — BODY MASS INDEX: 28.15 KG/M2 | WEIGHT: 164 LBS

## 2021-06-02 NOTE — PATIENT INSTRUCTIONS - HE
Dillon Pennington,    It was a pleasure to see you today at the [facility].     My recommendations after this visit include:    - No medications changes made today     - Follow up with Dr. Rivera in December as scheduled    - Follow with Dr. Russo in January per his recommendation.     - Follow up in Heart Failure Clinic with Marion Blunt CNP in 6 months    - Please call Carolyne Turner [phone], if you have any questions or concerns    Irlanda Edwards CNP

## 2021-06-03 VITALS — HEIGHT: 62 IN | WEIGHT: 165 LBS | BODY MASS INDEX: 30.36 KG/M2

## 2021-06-03 VITALS
SYSTOLIC BLOOD PRESSURE: 98 MMHG | HEIGHT: 62 IN | RESPIRATION RATE: 12 BRPM | HEART RATE: 64 BPM | WEIGHT: 169 LBS | DIASTOLIC BLOOD PRESSURE: 62 MMHG | BODY MASS INDEX: 31.1 KG/M2

## 2021-06-03 VITALS — BODY MASS INDEX: 30.12 KG/M2 | WEIGHT: 170 LBS | HEIGHT: 63 IN

## 2021-06-03 VITALS
HEIGHT: 62 IN | WEIGHT: 171 LBS | SYSTOLIC BLOOD PRESSURE: 118 MMHG | BODY MASS INDEX: 31.47 KG/M2 | HEART RATE: 79 BPM | DIASTOLIC BLOOD PRESSURE: 80 MMHG | RESPIRATION RATE: 14 BRPM

## 2021-06-03 VITALS — BODY MASS INDEX: 30 KG/M2 | WEIGHT: 164 LBS

## 2021-06-03 VITALS — BODY MASS INDEX: 29.81 KG/M2 | WEIGHT: 163 LBS

## 2021-06-03 NOTE — PATIENT INSTRUCTIONS - HE
Dillon Pennington    Thank you for coming to the [facility] today for a cardiac evaluation  It was my pleasure to see you today  A good contact for any questions would be Beverly Murphy  RN @ [phone]    Pacemaker defibrillator evaluation today shows chronic atrial fibrillation with  elevated ventricular response  Rate is above 120 bpm 25 to 30% of the time  Medicine adjustments  Should take hydralazine 25 mg 3 times daily  Reduce metoprolol to 200 mg daily  Discontinue sosorbide  Continue to have device check through transtelephonic monitoring every 3 months  Patient may be candidate for AV node ablation and upgrade of his device to a biventricular system if we are and unable to adequately control heart rate  Follow up with Dr Russo < 6 months

## 2021-06-03 NOTE — PROGRESS NOTES
In clinic device check with Device RN and follow-up with Dr. Rivera.  Please see link for full device report.  Patient was informed of results and next follow up during today's visit.

## 2021-06-03 NOTE — PROGRESS NOTES
[facility] Care Note    Assessment:  Recurrent episodes of ventricular fibrillation  Long QT  Dual-chamber ICD implanted April 24, 2018  Advanced ischemic cardiomyopathy  Echocardiogram July 11, 2018 showed improvement in ejection fraction; earlier 20% now 30-35% there is also less mitral regurgitation  f atrial fibrillation with elevated ventricular response-chronic atrial fibrillation  Chronic anticoagulation with Xarelto 15 mg; dose reduced because of renal insufficiency  Chronic renal failure; creatinine 1.78  Hyperlipidemia well-controlled on high-dose atorvastatin with recent cholesterol 156, LDL 73  Severe coronary artery disease with intervention 4-; critical mid LAD 99%; mid circumflex 90%; high-grade diagonal (Impella supported) PCI to 3 vessels      Plan:      Pacemaker defibrillator evaluation today shows chronic atrial fibrillation with  elevated ventricular response  Rate is above 120 bpm 25 to 30% of the time  Medicine adjustments  Should take hydralazine 25 mg 3 times daily  Reduce metoprolol to 200 mg daily  Discontinue sosorbide  Continue to have device check through transtelephonic monitoring every 3 months  Patient may be candidate for AV node ablation and upgrade of his device to a biventricular system if we are and unable to adequately control heart rate  Follow up with Dr Russo < 6 months         Subjective:    I had the opportunity to see.Dillon Pennington , who is a 64 y.o. male with a known history of advanced ischemic cardiomyopathy and ventricular fibrillation  Patient is with a long   Tells me he is been feeling well although does have some shortness of breath on exertion and cannot lift heavy objects  No chest pain  No awareness of palpitations or arrhythmias no syncopal or near syncopal episodes  Does not know his medications but apparently a home nurse comes and puts out his pills every 2 weeks and he tells me he takes his medicines on schedule  I looked through his  pillboxes and there was to hydralazine bottles 1 450 mg 3 times a day, 1 for 25 mg 3 times a day.  During his last visit with Dr. Russo he was recommended to reduce his hydralazine to 25 mg 3 times daily because of hypotension  He does take Xarelto 15 mg daily-dose reduced because of renal insufficiency  Echocardiogram June 12, 2019 showed ejection fraction of 33% with mild to moderate mitral regurgitation this was felt unchanged compared to July 11, 2018  Been in persistent atrial fibrillation.  During atrial fibrillation he does have elevated ventricular response and is above 120 bpm 25 to 30% of the time  Also on a high amount of metoprolol-300 mg daily        Problem List:  Patient Active Problem List   Diagnosis     Chronic kidney disease (CKD), stage III (moderate) (H)     Chronic systolic heart failure (H)     Dilated cardiomyopathy (H)     Persistent atrial fibrillation     VF (ventricular fibrillation) (H)     Coronary artery disease involving native coronary artery of native heart without angina pectoris     Ventricular tachycardia (H)     Anemia     Dyslipidemia, goal LDL below 70     ICD (implantable cardioverter-defibrillator) in place, dual chamber     Medical History:  Past Medical History:   Diagnosis Date     DMITRI (acute kidney injury) (H)      CKD (chronic kidney disease)      Dyslipidemia, goal LDL below 70 4/16/2018     Mushroom poisoning 9/11/2006     Nonischemic cardiomyopathy (H)      Paroxysmal atrial fibrillation (H)      Persistent atrial fibrillation 4/12/2018     Surgical History:  Past Surgical History:   Procedure Laterality Date     CHOLECYSTECTOMY       CV CORONARY ANGIOGRAM N/A 4/18/2018    Procedure: Coronary Angiogram;  Surgeon: Heriberto Cheung MD;  Location: [facility] Lab;  Service:      CV CORONARY ANGIOGRAM N/A 4/20/2018    Procedure: Coronary Angiogram;  Surgeon: Javon Carrington MD;  Location: [facility] Lab;  Service:      CV IMPELLA INSERT N/A 4/20/2018     Procedure: Impella Insert;  Surgeon: Javon Carrington MD;  Location: [facility];  Service:      CV LEFT HEART CATHETERIZATION WO LEFT VETRICULOGRAM Left 4/18/2018    Procedure: Left Heart Catheterization Without Left Ventriculogram;  Surgeon: Heriberto Cheung MD;  Location: [facility] Lab;  Service:      EP ICD INSERT N/A 4/23/2018    Procedure: EP ICD Insert;  Surgeon: Rakesh Rivera MD;  Location: [facility];  Service:      PICC  4/18/2018          Social History:  Social History     Socioeconomic History     Marital status:      Spouse name: Soto Rivera     Number of children: Not on file     Years of education: Not on file     Highest education level: Not on file   Occupational History     Not on file   Social Needs     Financial resource strain: Not on file     Food insecurity:     Worry: Not on file     Inability: Not on file     Transportation needs:     Medical: Not on file     Non-medical: Not on file   Tobacco Use     Smoking status: Never Smoker     Smokeless tobacco: Never Used   Substance and Sexual Activity     Alcohol use: Yes     Comment: very rare      Drug use: No     Sexual activity: Not on file   Lifestyle     Physical activity:     Days per week: Not on file     Minutes per session: Not on file     Stress: Not on file   Relationships     Social connections:     Talks on phone: Not on file     Gets together: Not on file     Attends Voodoo service: Not on file     Active member of club or organization: Not on file     Attends meetings of clubs or organizations: Not on file     Relationship status: Not on file     Intimate partner violence:     Fear of current or ex partner: Not on file     Emotionally abused: Not on file     Physically abused: Not on file     Forced sexual activity: Not on file   Other Topics Concern     Not on file   Social History Narrative    The patient's son was present to provide the HPI.       Review of Systems:      General:  WNL  Eyes: Visual Distubance  Ears/Nose/Throat: WNL  Lungs: Shortness of Breath  Heart: WNL  Stomach: WNL  Bladder: Frequent Urination at Night  Muscle/Joints: WNL  Skin: WNL  Nervous System: Daytime Sleepiness, Loss of Balance  Mental Health: WNL     Blood: WNL        Family History:  No family history on file.      Allergies:  No Known Allergies    Medications:  Current Outpatient Medications   Medication Sig Dispense Refill     allopurinol (ZYLOPRIM) 300 MG tablet Take 300 mg by mouth daily.       atorvastatin (LIPITOR) 80 MG tablet Take 1 tablet (80 mg total) by mouth daily. (Patient taking differently: Take 80 mg by mouth daily.       ) 90 tablet 3     cholecalciferol, vitamin D3, (VITAMIN D3) 5,000 unit Tab Take 5,000 Units by mouth Daily at 5 pm.       clopidogrel (PLAVIX) 75 mg tablet Take 1 tablet (75 mg total) by mouth daily. (Patient taking differently: Take 75 mg by mouth daily.       ) 90 tablet 3     fluticasone (VERAMYST) 27.5 mcg/actuation nasal spray 1 spray into each nostril at bedtime. Indications: inflammation of the nose due to an allergy       furosemide (LASIX) 20 MG tablet Take 1 tablet (20 mg total) by mouth daily. (Patient taking differently: Take 20 mg by mouth daily.       ) 90 tablet 3     isosorbide mononitrate (IMDUR) 30 MG 24 hr tablet Take 1 tablet (30 mg total) by mouth daily. (Patient taking differently: Take 30 mg by mouth daily.       ) 90 tablet 3     levothyroxine (SYNTHROID) 88 MCG tablet Take 88 mcg by mouth Daily at 6:00 am. Indications: hypothyroidism       metoprolol succinate (TOPROL-XL) 100 MG 24 hr tablet Take 3 tablets (300 mg total) by mouth every evening. (Patient taking differently: Take 300 mg by mouth every evening.       ) 270 tablet 3     nitroglycerin (NITROSTAT) 0.4 MG SL tablet Place 1 tablet (0.4 mg total) under the tongue every 5 (five) minutes as needed for chest pain. (Patient taking differently: Place 0.4 mg under the tongue every 5 (five) minutes as  "needed for chest pain.       ) 1 Bottle 3     rivaroxaban 15 mg Tab Take 1 tablet (15 mg total) by mouth daily with supper. (Patient taking differently: Take 15 mg by mouth daily with supper.       ) 30 tablet 3     acetaminophen (TYLENOL EXTRA STRENGTH) 500 MG tablet Take 500 mg by mouth every 6 (six) hours as needed for pain. Indications: Pain       hydrALAZINE (APRESOLINE) 25 MG tablet Take 1 tablet (25 mg total) by mouth 3 (three) times a day. (Patient taking differently: Take 25 mg by mouth 3 (three) times a day.       ) 270 tablet 0     No current facility-administered medications for this visit.          Surgical site  The ICD is well-healed to left subclavicular region    Device interrogation  Atrial fibrillation present with heart rate range being  bpm  20% ventricular pacing  Heart rate above 120 bpm 25 to 30% of the time  Elevated heart rates generally are atrial for with elevated ventricular response not clear-cut ventricular tachycardia  Battery good for another 10 years  Device programmed to VVIR lower rate equals 60    Objective:   Vital signs:  /86 (Patient Site: Left Arm, Patient Position: Sitting, Cuff Size: Adult Regular)   Pulse 63   Resp 14   Ht 5' 4\" (1.626 m)   Wt 172 lb (78 kg)   BMI 29.52 kg/m    Blood pressure 94 sitting, 104 standing  Heart rate is 80 irregular consistent with atrial fibrillation  Physical Exam:      GENERAL APPEARANCE: Alert, cooperative and in no acute distress.  HEENT: No scleral icterus. No Xanthelasma. Oral mucous membranes pink and moist.  NECK: JVP normal cm. No Hepatojugular reflux. Thyroid not  Palpable  CHEST: clear to auscultation and percussion  CARDIOVASCULAR: S1, S2 without murmur    Brachial, radial  pulses are intact and symmetric.   No carotid bruits noted.  ABDOMEN: Non tender. BS+. No bruits.  EXTREMITIES: No cyanosis, clubbing or edema.    Lab Results:  LIPIDS:  Lab Results   Component Value Date    CHOL 156 08/17/2018    CHOL 198 " 04/14/2018     Lab Results   Component Value Date    HDL 43 08/17/2018    HDL 44 04/14/2018     Lab Results   Component Value Date    LDLCALC 73 08/17/2018    LDLCALC 129 04/14/2018     Lab Results   Component Value Date    TRIG 201 (H) 08/17/2018    TRIG 127 04/14/2018     No components found for: CHOLHDL    BMP:  Lab Results   Component Value Date    CREATININE 1.78 (H) 08/17/2018    BUN 32 (H) 08/17/2018     08/17/2018    K 4.4 08/17/2018     (H) 08/17/2018    CO2 25 08/17/2018         This note has been dictated using voice recognition software. Any grammatical or context distortions are unintentional and inherent to the software.  Rakehs Rivera MD  [facility]  [phone]

## 2021-06-04 VITALS
BODY MASS INDEX: 30.65 KG/M2 | RESPIRATION RATE: 16 BRPM | WEIGHT: 173 LBS | DIASTOLIC BLOOD PRESSURE: 80 MMHG | HEIGHT: 63 IN | HEART RATE: 64 BPM | SYSTOLIC BLOOD PRESSURE: 100 MMHG

## 2021-06-04 VITALS
SYSTOLIC BLOOD PRESSURE: 110 MMHG | HEIGHT: 64 IN | HEART RATE: 63 BPM | RESPIRATION RATE: 14 BRPM | WEIGHT: 172 LBS | BODY MASS INDEX: 29.37 KG/M2 | DIASTOLIC BLOOD PRESSURE: 86 MMHG

## 2021-06-04 VITALS
RESPIRATION RATE: 16 BRPM | HEART RATE: 80 BPM | SYSTOLIC BLOOD PRESSURE: 122 MMHG | OXYGEN SATURATION: 98 % | WEIGHT: 169 LBS | TEMPERATURE: 97.8 F | DIASTOLIC BLOOD PRESSURE: 88 MMHG | BODY MASS INDEX: 29.01 KG/M2

## 2021-06-04 VITALS — BODY MASS INDEX: 30.83 KG/M2 | WEIGHT: 174 LBS | HEIGHT: 63 IN

## 2021-06-04 VITALS
WEIGHT: 172 LBS | HEIGHT: 63 IN | BODY MASS INDEX: 30.48 KG/M2 | HEART RATE: 60 BPM | SYSTOLIC BLOOD PRESSURE: 123 MMHG | DIASTOLIC BLOOD PRESSURE: 80 MMHG

## 2021-06-04 VITALS
WEIGHT: 172 LBS | SYSTOLIC BLOOD PRESSURE: 122 MMHG | DIASTOLIC BLOOD PRESSURE: 80 MMHG | BODY MASS INDEX: 30.47 KG/M2 | HEART RATE: 60 BPM

## 2021-06-04 VITALS
BODY MASS INDEX: 28.85 KG/M2 | HEIGHT: 64 IN | DIASTOLIC BLOOD PRESSURE: 84 MMHG | SYSTOLIC BLOOD PRESSURE: 120 MMHG | WEIGHT: 169 LBS | HEART RATE: 68 BPM | RESPIRATION RATE: 16 BRPM

## 2021-06-04 VITALS
DIASTOLIC BLOOD PRESSURE: 90 MMHG | BODY MASS INDEX: 30.82 KG/M2 | HEART RATE: 60 BPM | WEIGHT: 174 LBS | SYSTOLIC BLOOD PRESSURE: 115 MMHG

## 2021-06-05 VITALS
HEART RATE: 69 BPM | WEIGHT: 170 LBS | RESPIRATION RATE: 16 BRPM | SYSTOLIC BLOOD PRESSURE: 100 MMHG | DIASTOLIC BLOOD PRESSURE: 70 MMHG | BODY MASS INDEX: 30.11 KG/M2 | OXYGEN SATURATION: 98 %

## 2021-06-05 VITALS
BODY MASS INDEX: 30.48 KG/M2 | SYSTOLIC BLOOD PRESSURE: 104 MMHG | WEIGHT: 172 LBS | HEIGHT: 63 IN | DIASTOLIC BLOOD PRESSURE: 72 MMHG | HEART RATE: 60 BPM | OXYGEN SATURATION: 98 % | RESPIRATION RATE: 16 BRPM

## 2021-06-05 NOTE — TELEPHONE ENCOUNTER
Per 12/2/20 office visit with Dr Rivera:  Chronic anticoagulation with Xarelto 15 mg; dose reduced because of renal insufficiency    Refill sent.  Spouse Soto updated.  -[facility]

## 2021-06-05 NOTE — TELEPHONE ENCOUNTER
"----- Message from Tanyatesfaye Dan sent at 2/6/2020  9:40 AM CST -----  Regarding: MDG PT- Rx Issue  General phone call:    Caller: Spouse Soto Rivera  Primary cardiologist: MDG  Detailed reason for call: Patient is calling to set up an F/U with MDG, I explained that the patient is not due until 6/2020 per [facility] OV note. She states the patient is out of medication and needs it refilled. She spelled the medication \"Xarelto\", however I did not see it on his chart. Please call back    Best phone number: [phone], Soto- she does speak english but it difficult to hear on the phone she called on   Best time to contact: any  Ok to leave a detailed message? yes  Device? yes    Additional Info:        "

## 2021-06-06 NOTE — PROGRESS NOTES
Pt was called, spoke to pt using [employer] , corresponding information/recommendations reviewed, verbalized understanding, has no further questions at this time, contact information was given for further concerns/questions, RX was sent to pt pharmacy and medication list updated   2/20/2020 2:15 PM  Beverly Murphy RN

## 2021-06-06 NOTE — PROGRESS NOTES
In clinic device check with Device RN and Dr. Russo.  Please see link for full device report.  Patient was informed of results and next follow up during today's visit.

## 2021-06-07 NOTE — TELEPHONE ENCOUNTER
Arjun Winslow  This is a mutual patient that has ischemic cardiomyopathy and reduced ejection fraction and I had written to you about higher heart rates and atrial fibrillation and was already on significant amount of beta-blocker and you recently added diltiazem and here is the follow-up monitor report.  Looks like he is still having higher heart rates approximately 15% of the time.  I look forward to your thoughts and recommendations.  Thanks Alberto

## 2021-06-07 NOTE — PROGRESS NOTES
In clinic device check with Device RN and Kelechi Rubalcava NP.  Please see link for full device report.  Patient was informed of results and next follow up during today's visit.

## 2021-06-07 NOTE — TELEPHONE ENCOUNTER
Today I called and was able to talk to Peter's daughter Bernard.  She is a PCA and usually spends about an hour with her dad every day.  She will try to go there earlier in the day.  She tells me Peter has not worked for probably 10 years or more.  I  Discussed case with Constance Basilio  at [facility] and her recommendation  based on information I have of him being a [place] citizen, age 65 and birthdate that he should qualify for emergency medical assistance.   I  discussed that I believe he should qualify for medically disability.  This should get him some type of payment until he would become eligible for Social Security.  I need help from her to get the paperwork from [facility] [employer] [facility].  I gave her phone number of [phone].  To call me if she has any questions or issues after contacting [facility] [employer] [facility].  To contact me with paperwork for medical disability when she has that.  I discussed the medical assistance is paperwork that they will need to do as a family.

## 2021-06-07 NOTE — TELEPHONE ENCOUNTER
I was able to reach Mónica Woodruff, daughter of Dillon Pennington.  She speaks good English.  She tells me her father is a [place] citizen.  He lives with her mother who is technically his girlfriend as they are not  but they have been together many years.  Her Sister Bernard Pennington is the PCA for her father.  She asked Marci to give me a call with regard to my message.  I discussed with Mónica that I need help from the family to get paperwork as I believe that Peter should qualify for emergency medical assistance and I need paperwork to try to apply for medical disability.  I believe he qualifies as his heart function has not improved since his heart attack in 2018.      I did not have permission from Peter to make this phone call to Mónica, but I believe Peter would be okay with it as he told me he was desperate to have some social action taken.  Mónica tells me there is a possibility that they could become homeless.  Mónica prefers I work with her sister regarding her dad as she knows what is going on with him and gave me contact information and has asked Bernard to call me.

## 2021-06-07 NOTE — TELEPHONE ENCOUNTER
MorganwKelechi that is above and beyond and I cant thank you enough for the excellent care every time you see my patients Alberto

## 2021-06-07 NOTE — TELEPHONE ENCOUNTER
Dr. Rivera- Dr. Sommers met with pt today via telephone consult to discuss upgrade from Dual ICD to CRT-D.  Per Dr. Sommers, ok to schedule with you as you have seen the pt in the past.    Please review Dr. Sommers's consult telephone visit from today 5/1/20 and let us know how you would like to proceed with scheduling.      Thank you,    Jessica

## 2021-06-07 NOTE — TELEPHONE ENCOUNTER
Wellness Screening Tool  Symptom Screening:  Do you have one of the following new symptoms:    Fever or reported chills?  No    A new cough (started within the past 14 days)?  No    Shortness of breath (started within the past 14 days) ?  No     Nausea, vomiting, or diarrhea?  No    Within the past 3 weeks, have you been exposed to someone with a known positive illness below:    COVID-19 (known or suspected)?  No    Chicken pox?  No    Mealses?  No    Pertussis?  No    Patient notified of visitor restrictions: Yes  Patient's appointment status: Patient will be seen in clinic as scheduled on 4/15/20

## 2021-06-07 NOTE — PATIENT INSTRUCTIONS - HE
Dillon Pennington,    It was a pleasure to see you today at [facility].     My recommendations after this visit include:  - Please follow up with Dr Russo in June  - Please follow up with Marion Blunt in August  - Continue current medications    Marion Blunt, CNP

## 2021-06-07 NOTE — PROGRESS NOTES
Arjun Winslow  This is a mutual patient that has ischemic cardiomyopathy and reduced ejection fraction and I had written to you about higher heart rates and atrial fibrillation and was already on significant amount of beta-blocker and you recently added diltiazem and here is the follow-up monitor report.  Looks like he is still having higher heart rates approximately 15% of the time.  I look forward to your thoughts and recommendations.  Thanks Alberto       Ischemic cardiomyopathy with ejection fraction about 30%  Dual-chamber [employer] ICD April 22, 2018-at that time sinus rhythm  Has been in atrial fibrillation for over a year and has been classified as permanent atrial fibrillation  EKG February 19 showed narrow QRS with normal QT interval with controlled ventricular response  Device interrogation shows chronic atrial fibrillation with elevated heart rate; ventricular rate above 120 15% of the time-some quite elevated VR  33% ventricular pacing  I wondered about cardioversion; start him on amiodarone and doing a cardioversion after 2 weeks of amiodarone  But unlikely to maintain sinus rhythm considering the duration of atrial fibrillation  Already on high doses of medications to control ventricular response  Obtain echocardiogram and if ejection fraction has dropped or if he seems to have more symptoms of heart failure then proceed AV node ablation and upgrade of his device to a CRT-D system  Would he be willing to proceed?

## 2021-06-07 NOTE — PATIENT INSTRUCTIONS - HE
Dillon Pennington,    It was a pleasure to see you today at the [facility].     My recommendations after this visit include:    I discussed AV node ablation with upgrade of your device to CRTD.     To followup in clinic as planned after procedure with Dr. Sommers.    My contact information:  Kelechi Rubalcava, [place]  After Hours or Scheduling  [phone]  My Nurse---Val Ojeda [phone]

## 2021-06-07 NOTE — TELEPHONE ENCOUNTER
Called patient with an . Discussed monitor and had him send a manual download. Confirmed with  and son and patient that he is in fact taking dilt 180mg extended release, a nurse sets up his medications. Type: Remote ICD transmission to check HRs per Dr. Russo.  Presenting: Atrial fibrillation with VS/ 60bpm  Lead/Battery Status: Stable  Atrial Arrhythmias: Programmed VVI, chronic AF. V-rates >/=120bpm ~15%.   Vent Arrhythmias: Since 2/19/2020, 13 NSVT and 18 other untreated episodes. Review of EGMs show AF with RVR up to 45sec durations with rates up to 180-200bpm.  Comments: Normal ICD function. See Epic for note. SERENITY Hunt RN BSN PHN  Device Nurse   [facility]

## 2021-06-08 NOTE — TELEPHONE ENCOUNTER
Called pharm and they are faxing me the last 2 monnths of med refills. As I look through the chart I see only Imdur 30 and last on med list was 12/19 it was on [facility] list but at 2/6 refill note the list does not have imdur on.   Pharm states the last time they filled was Oct 2019 with a 30 day fill.

## 2021-06-08 NOTE — TELEPHONE ENCOUNTER
----- Message from aPrth Russo MD sent at 6/5/2020  8:02 AM CDT -----  I cannot tell from the pharmacy list if he is refilling the isosorbide.  Would like him to continue on isosorbide mononitrate 30 mg as long as there is no reason that it was discontinued.mdg

## 2021-06-08 NOTE — TELEPHONE ENCOUNTER
Spoke with home health RN and pt has restarted Imdur 6/5. The last 2 days the pt has not taken because of HA and dizziness. Pt hr today was 64 and BP was 134/100 but pt had not taken any am meds. Will notify Dr. Russo.

## 2021-06-08 NOTE — TELEPHONE ENCOUNTER
----- Message from Parth Russo MD sent at 6/4/2020  1:10 PM CDT -----  Hi  Can we get a list of medicines from pharmacy, isordil is not listed anymore?  Ty mdg

## 2021-06-08 NOTE — PATIENT INSTRUCTIONS - HE
Nice to visit with you today.We talked about monitoring for increasing shortness of breath, dizziness or fluid retention or swelling.  He did tell me about occasional cough after you take the metoprolol but after discussion we decided that you were okay continue the medication but if symptoms are getting worse please do not hesitate to contact us.  My nurse is Viji and her number is [phone].

## 2021-06-08 NOTE — TELEPHONE ENCOUNTER
Imdur ordered and call to pt to have him take one tab per day. Pt repeated instructions back. Will  later today.

## 2021-06-08 NOTE — PROGRESS NOTES
Dr. Rivera- Dr. Sommers met with pt today via telephone consult to discuss upgrade from Dual ICD to CRT-D.  Per Dr. Sommers, ok to schedule with you as you have seen the pt in the past.     Please review Dr. Sommers's consult telephone visit from today 5/1/20 and let us know how you would like to proceed with scheduling.        Thank you,     Jessica  Chronic AF  Narrow QRS  Some VR elevations; >120 5-10%  No need for upgrade unless AVN ablation done  If he has poorly controlled CHF would favor AVN ablation and upgrade to CRTD; but if controlled CHF then would consider ongoing medical management   Previously I considered a trial of amiodarone with subsequent CV-but AF clinic thought unlikely to maintain NSR  Some reluctance to proceed with AVN ablation in patients with limited comprehension and young age   Would like your clinical judgement

## 2021-06-08 NOTE — TELEPHONE ENCOUNTER
----- Message from Ángela Pennington sent at 6/11/2020  9:21 AM CDT -----  Regarding: [facility] PT / SYMPTOMS FROM MEDICATION  General phone call:    Caller: AMBER Martinez from [facility]     Primary cardiologist: MDG     Detailed reason for call: Michelle states pt was just prescribed the medication isosorbide mononitrate (IMDUR) 30 MG 24 hr tablet and he has been experiencing headaches and dizziness after taking it. Pt has not taken the medication for 2 days now, they are requesting for a call back on recommendations.     New or active symptoms? Yes     Best phone number: [phone], AMBER Martinez     Best time to contact: Anytime     Ok to leave a detailed message? Yes     Device? Yes     Additional Info:

## 2021-06-10 NOTE — TELEPHONE ENCOUNTER
----- Message from Parth Russo MD sent at 7/26/2020  8:27 PM CDT -----  Interval improvement in LV function 33% to 53% on current study, should have follow up of renal function, lipid,liver which he can do with his primary, its possible we dont have the most recent lab results and  I believe is also followed by renal   mdg    -----------------------------------------------------------------------    Message sent to home care.  -yovany

## 2021-06-11 NOTE — TELEPHONE ENCOUNTER
10/9/2020 TBD (60 min)   Scalret     RN - HH HOME VISIT    HE     Kristina, Bree V, RN      Message sent to home care RN.  -[facility]

## 2021-06-11 NOTE — TELEPHONE ENCOUNTER
----- Message -----  From: Bree Acevedo RN  Sent: 9/30/2020   9:35 PM CDT  To: Viji Marks RN    Yes, I will draw lab for him next SNV plan for Thursday or Friday.  ----- Message -----  From: Viji Marks RN  Sent: 9/30/2020   3:50 PM CDT  To: Bree ROBBINS RN    ----- Message from Viji Marks RN sent at 9/30/2020  3:50 PM CDT -----  Hi - is there any chance you could collect a fasting lipid panel and cmp at your next visit with this patient?  I sent a message to the previous home care RN, but noticed they haven't been collected yet.

## 2021-06-13 NOTE — PATIENT INSTRUCTIONS - HE
Dillon Pennington    Thank you for coming to the [facility] today for a cardiac evaluation  It was my pleasure to see you today  A good contact for any questions would be Beverly Murphy  RN @ [phone]      Plan:  1.  ICD interrogation shows battery good for 9 years 6 months  Chronic atrial fibrillation no atrial pacing  37% ventricular pacing would indicate good ventricular rate control  Remains on Xarelto 15 mg daily  Comprehensive blood work October 8, 2020; creatinine 1.59  Electrolytes normal  Liver panel normal  Creatinine=1.59  Total cholesterol 127 with HDL 38, LDL 61  Echocardiogram July 27, 2020 ejection fraction was 53%; there was some hypokinesis of the anterior apical region  Some anterior apical hypokinesia; compared to June 12, 2019 there is substantial improvement left ventricular function  Recent EKG showed atrial fibrillation with narrow QRS controlled ventricular response

## 2021-06-13 NOTE — TELEPHONE ENCOUNTER
CHICA to call [phone] to discuss.  -[facility]    ----- Message from Nathalie Mcdonald sent at 12/1/2020  9:34 AM CST -----    Primary cardiologist: Dr. Russo    Detailed reason for call: Dr. Kali Pennington would like to talk to you about this patient's last appointment with Dr. Russo. She is looking to get more information about the last appointment please advise [phone]    Device? no    Additional Info:

## 2021-06-13 NOTE — TELEPHONE ENCOUNTER
Wellness Screening Tool  Symptom Screening:  Do you have one of the following NEW symptoms:    Fever (subjective or >100.0)?  No    A new cough?  No    Shortness of breath?  No     Chills? No     New loss of taste or smell? No     Generalized body aches? No     New persistent headache? No     New sore throat? No     Nausea, vomiting, or diarrhea?  No    Within the past 2 weeks, have you been exposed to someone with a known positive illness below:    COVID-19 (known or suspected)?  No    Chicken pox?  No    Mealses?  No    Pertussis?  No    Patient notified of visitor policy- They may have one person accompany them to their appointment, but they will need to wear a mask and will be screened upon arrival for symptoms: Yes  Pt informed to wear a mask: Yes  Pt notified if they develop any symptoms listed above, prior to their appointment, they are to call the clinic directly at [phone] for further instructions.  Yes  Patient's appointment status: Patient will be seen in clinic as scheduled on 11/11

## 2021-06-13 NOTE — PATIENT INSTRUCTIONS - HE
It was nice to visit with you today.Im glad you are feeling well.Please call with increased shortness of breath, swelling, fluid retention or heart related questions.My nurse is Viji and her number is [phone]

## 2021-06-13 NOTE — PROGRESS NOTES
HOME HEALTH MISSED VISIT NOTIFICATION (FYI)       Your patient who receives home health services missed a visit on: 12/11/20      Type of service missed: SNV for MSU      Reason for missed visit (pick applicable reason):           Patient/Family refused (explain): Patient agreed for SNV on 12/11/20 on 12/10/20. Patient wife called the morning of 12/11/20 requested to cancel the visit because patient went out of town with family until Sunday night. Wife requested for SNV on Monday for MSU. Wife stated he has enough meds until Sunday.           Plan for PRN visit on Monday for MSU                 Provider(s) to be notified: Jazmyne Farah

## 2021-06-14 NOTE — TELEPHONE ENCOUNTER
----- Message from Tracy Gallardo V sent at 12/29/2020 12:19 PM CST -----  General phone call: DEBORAH Pennington from [facility] wanted to have a consult w/ dr. Rivera regarding this patient.  I told the nurse that he was unavailable, but I would pass the message along to his nursing staff. Dx: Preop cataract removal, need to discuss if he is asafe to have cataract removal    Caller: Dacia  Primary cardiologist: CRUZ  Detailed reason for call: SEE ABOVE  New or active symptoms? NO  Best phone number: [phone]  Best time to contact: ANYIMTE  Ok to leave a detailed message? YES  Device? YES    Additional Info:

## 2021-06-14 NOTE — TELEPHONE ENCOUNTER
Reviewed with eye clinic, had questions regarding Plavix and Xarelto hold.  Read 12/1 note from [facility] word for word to NP.  All questions answered.  JW

## 2021-06-14 NOTE — TELEPHONE ENCOUNTER
Spoke with Rubina CABRERA.  She is requesting an MD look at patients EKG done today at pre-op for cataract surgery tomorrow 12/30.  Reviewed that Dr. Rivera is out of office, I would try to have another MD view it.    Known device pt last seen by Dr. Rivera 12/9 for recurrent VF episodes, advanced cardiomyopathy and chronic AF.     Appears no recent device alerts.      NP has already informed patient that due to short notice patient's surgery would be cancelled if cardiology isn't able to give clearance.    Will await EKG results to forward to MD.

## 2021-06-16 PROBLEM — I50.22 CHRONIC SYSTOLIC HEART FAILURE (H): Status: ACTIVE | Noted: 2018-04-13

## 2021-06-16 PROBLEM — E78.5 DYSLIPIDEMIA, GOAL LDL BELOW 70: Chronic | Status: ACTIVE | Noted: 2018-04-16

## 2021-06-16 PROBLEM — Z73.6 LIMITATION OF ACTIVITIES DUE TO DISABILITY: Status: ACTIVE | Noted: 2020-04-15

## 2021-06-16 PROBLEM — D64.9 ANEMIA: Status: ACTIVE | Noted: 2018-04-20

## 2021-06-16 PROBLEM — Z59.41 FOOD INSECURITY: Status: ACTIVE | Noted: 2020-04-15

## 2021-06-16 PROBLEM — R06.00 DYSPNEA: Status: ACTIVE | Noted: 2020-07-28

## 2021-06-16 PROBLEM — I48.21 PERMANENT ATRIAL FIBRILLATION (H): Status: ACTIVE | Noted: 2018-04-12

## 2021-06-16 PROBLEM — Z95.810 ICD (IMPLANTABLE CARDIOVERTER-DEFIBRILLATOR) IN PLACE: Status: ACTIVE | Noted: 2018-04-24

## 2021-06-16 PROBLEM — N18.30 CHRONIC KIDNEY DISEASE (CKD), STAGE III (MODERATE) (H): Chronic | Status: ACTIVE | Noted: 2018-03-21

## 2021-06-16 PROBLEM — I25.5 ISCHEMIC CARDIOMYOPATHY: Status: ACTIVE | Noted: 2020-07-28

## 2021-06-16 PROBLEM — R05.9 COUGH: Status: ACTIVE | Noted: 2020-02-19

## 2021-06-16 PROBLEM — I25.10 CORONARY ARTERY DISEASE INVOLVING NATIVE CORONARY ARTERY OF NATIVE HEART WITHOUT ANGINA PECTORIS: Chronic | Status: ACTIVE | Noted: 2018-04-12

## 2021-06-16 NOTE — PROGRESS NOTES
Patient seen in clinic for HF education s/p recent hospital discharge 2/21/18.  Reviewed HF Binder that includes the  HF Sx Awareness & Action plan  handout and  A Stronger Pump  booklet and Weight log booklet highlighting :  __X_patient s type of heart failure _X__Na management in diet  __X_importance of daily wts  _X__Fluid Guidelines, if applicable  __X_medication review and importance of compliance     Instructed patient in signs and sx of heart failure, reiterated when to call clinic - reviewed HF hotline # [phone] and after hours call # [phone].  Majority of time was spent reviewing: Maintaining a low sodium diet. Patient's wife prepares meals at home and is agreeable to avoid adding salt. Patient and his wife confirm they both are eating too much salt in any given meal and will be more conscionable about sodium content.  Patient verbalized understanding of HF discussion.  Plan for f/u with continued HF education reviewed.

## 2021-06-16 NOTE — TELEPHONE ENCOUNTER
Telephone Encounter by Jessica Reich, RN at 3/18/2019  2:34 PM     Author: Jessica Reich RN Service: -- Author Type: Registered Nurse    Filed: 3/18/2019  2:47 PM Encounter Date: 3/15/2019 Status: Addendum    : Jessica Reich RN (Registered Nurse)    Related Notes: Original Note by Jessica Reich RN (Registered Nurse) filed at 3/18/2019  2:46 PM       Dr. Rivera- Per your recommendations after remote transmission, pt was to be seen in a month with either you or Dr. Russo.  Unfortunately you are booked out until June and Dr. Russo's earliest available is 5/15.      Would you be okay with the patient scheduling with Dr. Russo's earliest appointment on 5/15?    Thanks,  Jessica Ospina, Jessica Mejia, RN             Arjun Lopez.   Dr Rivera is booked out until at least June and Dr Russo is booked out until 5/15. Would next available with Dr Russo be ok?   Thank you Maria Guadalupe

## 2021-06-16 NOTE — PROGRESS NOTES
Assessment/Plan:     1. Cardiomyopathy with systolic dysfunction, NYHA class II: Dillon Pennington appears well compensated.  His cardiomyopathy is likely tachycardia mediated.  No signs of fluid retention.  He continues to have dyspnea on exertion.  His weights have remained stable since discharge.  We discussed heart failure, following a low salt diet, monitoring weights, and heart failure treatment. He met with a heart failure nurse clinician to discuss heart failure management.  He will continue Lasix 20 mg daily.    2. Paroxysmal atrial fibrillation: Normal sinus rhythm with frequent PVCs and PACs on EKG today.  He will continue warfarin for anticoagulation.  His INR is are managed by his primary doctor.  He will continue amiodarone 200 mg daily.  I will recheck an echocardiogram to reassess heart function now that he is in sinus rhythm.    Follow-up with Dr. Russo April 20 as scheduled and in the heart failure clinic in 2 months or sooner if needed    Subjective:     Dillon Pennington is seen at [facility] heart failure clinic today for post-hospitalization follow-up. His wife accompanies him today. There is an  for the duration of the appointment.  He was hospitalized at [facility] from February 15 - February 21, 2018 with new atrial fibrillation with rapid ventricular response.  His BNP on admission was 447.  He was started on amiodarone and converted to sinus rhythm during hospitalization.  He was also started on warfarin for anticoagulation.  The most recent evaluation of His ejection fraction was 20-25% from an  echo on 2/16/2018.  His echocardiogram also showed moderate to severe mitral regurgitation and moderate tricuspid regurgitation. He has a past medical history is also significant for hypertension, atrial fibrillation, and chronic kidney disease.      Since being discharged from the hospital, Dillon feels that he is improving. He continues to have dyspnea on exertion. He denies any  other acute heart failure symptoms. He has a non-productive cough that has been present since discharge he states. No fevers or chills. He denies fatigue, lightheadedness, shortness of breath, orthopnea, PND, palpitations, chest pain, abdominal fullness/bloating and lower extremity edema.      Dillon davidson weight at discharge was 156 pounds.  He is monitoring home weights which are stable between 155-157 pounds.  He is following a low sodium diet.      Medication reconciliation was done today.    Review of Systems:   General: WNL  Eyes: Visual Distubance  Ears/Nose/Throat: Nosebleeds  Lungs: Cough  Heart: Shortness of Breath with activity  Stomach: WNL  Bladder: Frequent Urination at Night  Muscle/Joints: WNL  Skin: WNL  Nervous System: WNL  Mental Health: WNL     Blood: WNL    Patient Active Problem List   Diagnosis     Atrial fibrillation with rapid ventricular response     DMITRI (acute kidney injury)     Cough     Systolic congestive heart failure, unspecified congestive heart failure chronicity     Chronic kidney disease (CKD), stage III (moderate)       History reviewed. No pertinent past medical history.    Past Surgical History:   Procedure Laterality Date     CHOLECYSTECTOMY         History reviewed. No pertinent family history.    Social History     Social History     Marital status:      Spouse name: N/A     Number of children: N/A     Years of education: N/A     Occupational History     Not on file.     Social History Main Topics     Smoking status: Never Smoker     Smokeless tobacco: Never Used     Alcohol use Yes      Comment: very rare      Drug use: No     Sexual activity: Not on file     Other Topics Concern     Not on file     Social History Narrative       Current Outpatient Prescriptions   Medication Sig Dispense Refill     allopurinol (ZYLOPRIM) 100 MG tablet Take 0.5 tablets (50 mg total) by mouth daily with breakfast. 30 tablet 0     amiodarone (PACERONE) 200 MG tablet Take 1 tablet (200  mg total) by mouth daily. 90 tablet 0     atovaquone-proguanil (MALARONE) 250-100 mg Tab Take 1 tablet by mouth daily with breakfast.       CHERATUSSIN AC  mg/5 mL liquid Take 10 mL by mouth every 4 (four) hours as needed.  0     furosemide (LASIX) 20 MG tablet Take 1 tablet (20 mg total) by mouth daily. 90 tablet 3     levothyroxine (SYNTHROID, LEVOTHROID) 100 MCG tablet Take 100 mcg by mouth daily.       nitroglycerin (NITROSTAT) 0.4 MG SL tablet Place 0.4 mg under the tongue every 5 (five) minutes as needed for chest pain.       omeprazole (PRILOSEC) 20 MG capsule Take 1 capsule (20 mg total) by mouth daily before breakfast. 30 capsule 0     warfarin (COUMADIN) 2 MG tablet Take 1 tablet (2 mg) tonight and check INR tomorrow (2/22) and further Coumadin dosing per primary. 30 tablet 0     No current facility-administered medications for this visit.        No Known Allergies    Objective:     Vitals:    03/21/18 1002   BP: 128/80   Pulse: 82   Resp: 14     Wt Readings from Last 3 Encounters:   03/21/18 157 lb (71.2 kg)   02/21/18 156 lb (70.8 kg)   07/05/17 165 lb (74.8 kg)       General Appearance:   Alert, cooperative and in no acute distress.   HEENT:  No scleral icterus; the mucous membranes were pink and moist.   Neck: JVP normal. No HJR   Chest: The spine was straight. The chest was symmetric.   Lungs:   Respirations unlabored; the lungs are clear to auscultation.   Cardiovascular:   Regular rhythm with ectopic beat. S1 and S2 without murmur, clicks or rubs. Radial and posterior tibial pulses are intact and symmetrical.    Abdomen:  Soft, nontender, nondistended, bowel sounds present   Extremities: No cyanosis or edema.   Skin: No xanthelasma.   Neurologic: Mood and affect are appropriate.         Lab Review   Lab Results   Component Value Date    CREATININE 2.51 (H) 02/21/2018    BUN 53 (H) 02/21/2018     02/21/2018    K 3.6 02/21/2018     02/21/2018    CO2 26 02/21/2018     Lab Results    Component Value Date     (H) 02/15/2018     BNP (pg/mL)   Date Value   02/15/2018 447 (H)     Creatinine (mg/dL)   Date Value   02/21/2018 2.51 (H)   02/20/2018 3.37 (H)   02/19/2018 2.99 (H)   02/18/2018 2.40 (H)       Cardiographics  ECG: Normal sinus rhythm with frequent premature ventricular complexes and premature atrial complexes  Echocardiogram: 2/6/2018  Summary   1. The left ventricle is normal in size. Left ventricular systolic performance is severely reduced. The ejection fraction is estimated to be 20-25%.   2. There is severe global reduction in left ventricular systolic performance.  3. There is moderate to severe mitral insufficiency.   4. There is moderate tricuspid insufficiency.   5. Normal right ventricular size with moderate depression and right ventricular systolic performance.  6. There is moderate left atrial enlargement. There is mild to moderate right atrial enlargement.            45 minutes were face to face spent with the patient with greater than 50% spent on education and counseling.      Marion Blunt, [facility], [facility]–1983 [facility]   Heart Failure Clinic

## 2021-06-17 NOTE — PROGRESS NOTES
[facility] Heart Care Note    Assessment/Plan:    Dillon Pennington is a 63 y.o. old male with:  1. Congestive heart failure due to systolic dysfunction - etiology not clear could be due to afib withRVR or CAD - plan raise amiodarone 200mg bid (400mg now), start metoprolol succinate 25mg bid, increase furosemide 40mg daily if still not breathing well on monday, check BMP, INR and hemogram today. Will need evaluation of coronary anatomy.  Instructed to avoid high salt foods, how to check his pulse and he labeled his amiodarone container.      Come to clinic to have BP/HR checked on Monday at [facility]    followup scheduled on April 20th Dr. Russo    Addendum: labs with ECG atypical flutter and troponin 0.08 (higher than before), Cr improved 2.1and INR 1.1.  Discussed risks, beneftis, goals and alternatives for admission given heart failure, abn trop, rapid afib and CM, with plans for angiogrpahy. He is worried about home and needs to discuss with his family.   He refused but will start the medications as described and follow up next week as directed.     Dr. Javon Carrington  [facility]  [phone]  ______________________________________________________________________    Subjective:    I had the opportunity to see Dillon Pennington at the [facility] Heart Care Clinic. Dillon Pennington is a 63 y.o. male with a known history of Cardiomyopathy, afib with RVR on amiodarone, mod to severe MR,  severe CKD here due to afib with RVR while having echo and decline in EF on echo.  He reports PND, orthopnea, edema, chest pain with cough, no hx of HTN or tob use, no family hx of heart dz.   No GI/ bleeding, no fever chills, rash, syncopal events.   ______________________________________________________________________      Review of Systems:   General: Weight Loss  Eyes: WNL  Ears/Nose/Throat: WNL  Lungs: Cough, Shortness of Breath  Heart: Chest Pain, Shortness of Breath with activity  Stomach: WNL  Bladder: WNL  Muscle/Joints:  WNL  Skin: WNL  Nervous System: WNL  Mental Health: Depression     Blood: WNL    Problem List:  Patient Active Problem List   Diagnosis     Atrial fibrillation with rapid ventricular response     DMITRI (acute kidney injury)     Cough     Systolic congestive heart failure, unspecified congestive heart failure chronicity     Chronic kidney disease (CKD), stage III (moderate)     Medical History:  No past medical history on file.  Surgical History:  Past Surgical History:   Procedure Laterality Date     CHOLECYSTECTOMY       Social History:  No pertinent social hx related to patient's chief complaint other than above in subjective        Family History:  No pertinent family hx related to patient's chief complaint other than above in subjective      Allergies:  No Known Allergies    Medications:  Current Outpatient Prescriptions   Medication Sig Dispense Refill     allopurinol (ZYLOPRIM) 100 MG tablet Take 0.5 tablets (50 mg total) by mouth daily with breakfast. 30 tablet 0     amiodarone (PACERONE) 200 MG tablet Take 1 tablet (200 mg total) by mouth daily. 90 tablet 0     furosemide (LASIX) 20 MG tablet Take 1 tablet (20 mg total) by mouth daily. 90 tablet 3     levothyroxine (SYNTHROID, LEVOTHROID) 100 MCG tablet Take 100 mcg by mouth daily.       nitroglycerin (NITROSTAT) 0.4 MG SL tablet Place 0.4 mg under the tongue every 5 (five) minutes as needed for chest pain.       omeprazole (PRILOSEC) 20 MG capsule Take 1 capsule (20 mg total) by mouth daily before breakfast. 30 capsule 0     warfarin (COUMADIN) 2 MG tablet Take 1 tablet (2 mg) tonight and check INR tomorrow (2/22) and further Coumadin dosing per primary. (Patient taking differently: Take 2 mg by mouth See Admin Instructions. ) 30 tablet 0     warfarin (COUMADIN) 2.5 MG tablet Take 2.5 mg by mouth every other day. Alternate with 2 mg Warfarin tablets       atovaquone-proguanil (MALARONE) 250-100 mg Tab Take 1 tablet by mouth daily with breakfast.        "CHERATUSSIN AC  mg/5 mL liquid Take 10 mL by mouth every 4 (four) hours as needed.  0     No current facility-administered medications for this visit.        Objective:   Vital signs:  BP (!) 126/96 (Patient Site: Left Arm, Patient Position: Sitting, Cuff Size: Adult Regular)  Pulse 88  Resp 16  Ht 5' 2.75\" (1.594 m)  Wt 154 lb 9.6 oz (70.1 kg)  BMI 27.6 kg/m2      Physical Exam:    GENERAL APPEARANCE: Alert, cooperative and in no acute distress.  HEENT: No scleral icterus. No Xanthelasma. Oral mucuos membranes pink and moist.  NECK: JVP<6cm. No Hepatojugular reflux. Thyroid not visualized  CHEST: clear to auscultation  CARDIOVASCULAR: S1, S2 without murmur ,clicks or rubs. Brachial, radial and posterior tibial pulses are intact and symetric. No carotid bruits noted.    ABDOMEN: Nontender. BS+. No bruits.  EXTREMITIES: No cyanosis, clubbing or edema.    Lab Results:  LIPIDS:  No results found for: CHOL  No results found for: HDL  No results found for: LDLCALC  No results found for: TRIG  No components found for: CHOLHDL    BMP:  Lab Results   Component Value Date    CREATININE 2.51 (H) 02/21/2018    BUN 53 (H) 02/21/2018     02/21/2018    K 3.6 02/21/2018     02/21/2018    CO2 26 02/21/2018         Javon Carrington MD  [facility]  [phone]              "

## 2021-06-17 NOTE — TELEPHONE ENCOUNTER
Telephone Encounter by Beverly Merchant RN at 12/29/2020  1:46 PM     Author: Beverly Merchant RN Service: -- Author Type: Registered Nurse    Filed: 12/29/2020  1:48 PM Encounter Date: 12/29/2020 Status: Signed    : Beverly Merchant RN (Registered Nurse)       Dr. Sewell/Miguel,     Attaching EKGs, see note below.  NP at eye clinic is asking for review for pre-op clearance, EKGS are also scanned into media.    Thank you!  Layla

## 2021-06-17 NOTE — ANESTHESIA PREPROCEDURE EVALUATION
Anesthesia Evaluation      Patient summary reviewed     Airway    Pulmonary    (+) shortness of breath,   (-) not a smoker    ROS comment:  There is a new right PICC line with tip in good position in the low SVC. Cardiomegaly. Pulmonary vascularity is within normal limits. Discoid atelectasis in the right lower lobe. Lungs are otherwise clear. Previously seen endotracheal tube has   been removed.                         Cardiovascular   (+) CAD, dysrhythmias, CHF, cardiomyopathy,     ECG reviewed     ROS comment: Left Ventricle  The estimated left ventricular ejection fraction is 20%.Cavity is mildly increased.  Mitral Valve  Normal mitral valve structure. Moderate to severe mitral regurgitation.  Pericardium  No pericardial effusion.  Left Ventricle Measurements and Calculations     Echo LVEF Estimated:   20 %  LAD and  Circ - 90%  Diag 50%              Neuro/Psych      Endo/Other    (+) hypothyroidism,   (-) no obesity     GI/Hepatic/Renal    (+) GERD,   chronic renal disease CRI and ARF,           Dental                         Anesthesia Plan

## 2021-06-17 NOTE — PATIENT INSTRUCTIONS - HE
Please call my nurse Viji with any heart related questions.Her number is [phone]. We will plan to continue with the current medications.

## 2021-06-17 NOTE — TELEPHONE ENCOUNTER
Telephone Encounter by Beverly Merchant RN at 12/29/2020  3:54 PM     Author: Beverly Merchant RN Service: -- Author Type: Registered Nurse    Filed: 12/29/2020  3:54 PM Encounter Date: 12/29/2020 Status: Signed    : Beverly Merchant RN (Registered Nurse)       Jake Sewell MD Westlund, Jessica T, RN   Caller: Unspecified (Today,  1:21 PM)             Chart  and ECG reviewed.  No active complaints at 12/9/2020 virtual visit.  Ischemic cardiomyopathy with EF 53% July 2020, chronic atrial fibrillation, and normally functioning ICD.  Low cardiovascular risk is expected for eye surgery.   Jake Sewell

## 2021-06-17 NOTE — PROGRESS NOTES
Arrived for OP echo on 4-10-18. Found to be in A. Fib, rate 150's, /90, asymptomatic.  Patient refused ER evaluation. Conferred with Dr. White who suggested RAC appointment, and adjustment of Amiodarone dose.  RAC appointment scheduled.  Patient stated he has not had any Amiodarone for two weeks since he ran out of the med.  Instructed to go to pharmacy and refill prescription as soon as he left hospital and resume normal dose. Patient and spouse informed of RAC appointment time, verbalized understanding of appointment time and need to refill Amiodarone prescription.  All communication was through hospital .

## 2021-06-17 NOTE — ANESTHESIA PROCEDURE NOTES
Emergent Intubation  Date/Time: 4/17/2018 9:59 PM    Performing CRNA: ANA WOOD  Indications: respiratory failure  Route: oral  Technique: direct  Laryngoscope size: Escobar 2  Tube size: 7.5 mm  Tube type: cuffed  Cuff inflated: yes  Level of Difficulty: 0ETT to lip: 21 cm  Tube secured with: ETT donaldson  ETCO2 = Yes  Breath sounds: equal  SaO2 %: 94    Sign out given. CXR and sedation per primary care team.  Comments: Called to 4100 for code blue, CPR started, ett placed.

## 2021-06-17 NOTE — TELEPHONE ENCOUNTER
Telephone Encounter by Jessica Reich RN at 5/1/2020  1:43 PM     Author: Jessica Reich RN Service: -- Author Type: Registered Nurse    Filed: 5/1/2020  2:10 PM Encounter Date: 5/1/2020 Status: Signed    : Jessica Reich RN (Registered Nurse)       Ruben Sommers MD  P [facility] Rn Support Pool; Rachael Rubalcava CNP; Parth Russo MD; Rakesh Rivera MD               [facility],   We will proceed with upgrade of DDD ICD to CRT as outlined in my note.  Please feel free to schedule with Dr. Rivera who has seen the patient in the past.   Thank you.

## 2021-06-18 NOTE — PATIENT INSTRUCTIONS - HE
Patient Instructions by Khadar Vaz CNP at 1/29/2021 11:00 AM     Author: Khadar Vaz CNP Service: -- Author Type: Nurse Practitioner    Filed: 1/29/2021 11:48 AM Encounter Date: 1/29/2021 Status: Signed    : Khadar Vaz CNP (Nurse Practitioner)         Patient Education     Ruptured Eardrum, Traumatic    The eardrum is a thin membrane about 1 inch from the opening of the ear canal. It is easily injured by objects put into the ear canal. It may also be injured by a loud noise close to the ear or a slap to the ear. A ruptured eardrum will cause pain. There may be some clear or bloody drainage. A buzzing sound may be heard in the ear. Some hearing may be lost the affected ear.  The goal is to keep the ear dry and clean until the eardrum heals. Antibiotics may be prescribed if infection is present. Follow-up with ear and hearing specialists is advised. In many cases, hearing returns to normal after the eardrum heals.  Home care    Keep a cotton ball in the ear to keep it clean and protect the inner ear.    Do not use eardrops unless prescribed by the healthcare provider.    Do not get water in your ear when showering or bathing. No swimming until approved by the healthcare provider.    Take any prescription or over-the-counter medicines as advised.  Follow-up care  Follow up with specialists for test or exams as directed. A hearing test should be done soon after the injury. Also follow up within 2 weeks to check healing of the eardrum.  When to seek medical advice  Fever in children:    Child is 3 months old or younger and has a fever of 100.4 F (38 C) or higher. (Get medical care right away. Fever in a young baby can be a sign of a dangerous infection.)    Child is younger than 2 years of age and has a fever of 100.4 F (38 C) that continues for more than 1 day.    Child is 2 years old or older and has a fever of 100.4 F (38 C) that continues for more than 3 days.    Child is of any age and  has repeated fevers above 104 F (40 C).  Fever in adults:    Fever of 100.4 F (38 C)  Also call for any of the following:    Fluid drainage from the ear for longer than 24 hours    Increasing ear pain    Worsening headache or dizziness    Worsening hearing loss  Date Last Reviewed: 10/1/2017    6577-4583 [employer]. [address] [place] [address]45. All rights reserved. This information is not intended as a substitute for professional medical care. Always follow your healthcare professional's instructions.

## 2021-06-18 NOTE — PROGRESS NOTES
In clinic device check with Device Nurse followed by office visit with Dr. Pendleton.  Please see link for full device report.  Patient was informed of results and next follow up during today's visit.

## 2021-06-18 NOTE — PROGRESS NOTES
DEVICE CLINIC RN POST-OP NOTE    Reason for visit: PO ICD Check and Wound Assessment     Device: [employer] D142 INOGEN  Procedure date: 04/23/2018  Implant Diagnosis: Cardiac Arrest, Ventricular Fibrillation, Paroxysmal Atrial Fibrillation  Implanting Physician: Dr. Rakesh Rivera      Assessment  Subjective: Patient reports feeling well and denies any pain   Vitals:   Vitals:    05/25/18 1455   BP: 120/86   Pulse: 80   Resp: 16     Heart Sounds: normal  Lung Sounds: clear to auscultation bilaterally  Incision/device pocket: Steri-strips were absent. Incision appears well healed without signs of infection.      Device Findings  Interrogation of device reveals normal sensing and capture thresholds  See the Paceart Report for a full summary of the device information.          Patient Education  Dillon Pennington was accompanied today by a [facility]     [facility]'s Partnership Agreement for Device Patients and Post-operative Checklist were presented and reviewed with patient at today's appointment.     Signs and symptoms of infection, poor wound healing, and device function were reviewed. Range of motion and weight restrictions for the left side are 4 weeks. He was issued a Active-Semi remote monitor and instructed on its set-up and use for remote monitoring by the [employer] representative prior to hospital discharge. These instructions were reviewed with the patient at today s visit.       Plan  - 3 month PO check scheduled 08/17/18

## 2021-06-18 NOTE — PROGRESS NOTES
Type: ICD alert remote transmission for AT/AF for 6/24hrs.   Presenting rhythm: AP-VS 85 bpm.  Battery/lead status: stable.  Arrhythmias: since 6/9/18; 12 AF episodes, longest lasting 10hrs 48min on 6/16/18, v-rates >/=120bpm ~40%, AF burden 9%. No VT/VF detected.  Anticoagulant: Xarelto.  Comments: appears to be normal ICD function. Routed to device RN for review.   Device/lead alerts: none. DIPTI    Addendum: transmission reviewed, agree with above. Increasing episodes of AF with some RVR noted over last 2 weeks (see other notes in Epic). Patient is on Toprol  mg per son but there was question whether or not he is really on Mexilitine per son. V-rates are still a bit on higher side as mentioned above but patient seems mostly asymptomatic.     Will review with Dr. Russo at this time.   Rama Zhu RN

## 2021-06-19 NOTE — PROGRESS NOTES
Pt reports feeling dizzy during exit check out.  Pt was brought back to room 1 with .      /88  HR 70  O2 99% on room air    Device rate was decreased during appt from 80 to 70 bpm.   FLP, BMP, Mag, and TSH drawn.   Discussed with device RN and Dr Russo.  Plan to monitor pt.  Offered to assist pt to ED and he stated he was starting to feel better and wanted to go home.  Advised pt to go to ED if symptoms don't resolve, or if symptoms worsen.  Pt and spouse verbalized understanding and had no other questions.

## 2021-06-19 NOTE — LETTER
Letter by Lissette Palma RDCS at      Author: Lissette Palma RDCS Service: -- Author Type: --    Filed:  Encounter Date: 3/15/2019 Status: (Other)         Dillon Pennington  [address] [place] [address]      March 15, 2019      Dear Mr. Pennington,    RE: Remote Results    We are writing to you regarding your recent Remote ICD check from home. Your transmission was received successfully. Battery status is satisfactory at this time.     Your results are being reviewed.    Your next device appointment will be a remote check on June 18, 2019; this will occur automatically.    To schedule or reschedule, please call [phone] and press 1.    NOTE: If you would like to do an extra transmission, please call [phone] and press 3 to speak to a nurse BEFORE transmitting. This ensures that the Device Clinic staff is aware of the reason you are sending a transmission, and can follow-up with you after it has been reviewed.    We will be checking your implanted device from home (remotely) every three months unless otherwise instructed. We will need to see you in the clinic at least once a year. You may need to be seen in the clinic sooner depending on the results of your check.    Please be aware:    The follow-up schedule is like a Physician prescription.    Your remote monitor is paired to your specific implanted device.      Sincerely,    [facility] Device Clinic

## 2021-06-19 NOTE — PROGRESS NOTES
From: Parth Russo MD     Sent: 8/17/2018   5:49 PM       To: Viji Marks RN    I called him at home and he told me that the dizzy symptoms had resolved.mdg

## 2021-06-19 NOTE — LETTER
Letter by Lissette Palma RDCS at      Author: Lissette Palma RDCS Service: -- Author Type: --    Filed:  Encounter Date: 8/26/2019 Status: (Other)         Dillon Pennington  [address] [place] [address]      August 26, 2019      Dear Mr. Pennington,    RE: Remote Results    We are writing to you regarding your recent Remote ICD check from home. Your transmission was received successfully. Battery status is satisfactory at this time.     Your results are within normal limits.    Your next device appointment will be a clinic visit.  Please call in late September to schedule.      To schedule or reschedule, please call [phone] and press 1.    NOTE: If you would like to do an extra transmission, please call [phone] and press 3 to speak to a nurse BEFORE transmitting. This ensures that the Device Clinic staff is aware of the reason you are sending a transmission, and can follow-up with you after it has been reviewed.    We will be checking your implanted device from home (remotely) every three months unless otherwise instructed. We will need to see you in the clinic at least once a year. You may need to be seen in the clinic sooner depending on the results of your check.    Please be aware:    The follow-up schedule is like a Physician prescription.    Your remote monitor is paired to your specific implanted device.      Sincerely,    [facility]

## 2021-06-19 NOTE — PROGRESS NOTES
[facility] Progress Note    Assessment:  1.  Paroxysmal atrial fibrillation.  He is not aware of palpitations with 20% burden of atrial fibrillation on the most recent device check today.  He continues on metoprolol  mg daily as well as hydralazine 50 mg 3 times daily, isosorbide mononitrate 30 mg daily furosemide 20 mg daily.  He is on Xarelto which was started in the hospital.  He does have renal insufficiency and I have decided to have a repeat creatinine.  It appears that he had a creatinine of 1.88 at his primary care physician's office August 7, 2018.  Pending the results of the upcoming renal function will correspond with pharmacy about the current dosing of Xarelto which is 15 mg daily.    2.  Ischemic cardiomyopathy.  Ejection fraction has improved from 20% to 35%.  He appears to be doing well clinically.  Echocardiogram suggested an improvement in ejection fraction to 35% July 2018.    3.  Coronary artery disease with drug-eluting stents to the LAD/diagonal and mid circumflex.  No chest pain.  He is on Plavix in addition to Xarelto.    4.  History of ventricular tachycardia.  He was to be on mexiletine based on the discharge list of medications but this is not part of the medications that he brings in today.  I have asked that this be further defined by his home health nurse and I have written a note in this regard asking to be updated.    5.  Hyperlipidemia.  He is on atorvastatin 80 mg daily.  Going to plan a lipid and liver panel.    3 studies from his primary care physician's office August 6, 2018 BUN 39 creatinine 1.88 GFR of 37 sodium 141 potassium 4.1 chloride 105 CO2 23 AST 18 ALT of 12    4.  TSH was noted to be 0.242 at his primary care physician's office.  He is on Synthroid and will defer to his primary care physician as to whether there was any adjustments made in Synthroid.      Addendum: Patient reported feeling lightheaded when he was in the waiting room after the  visit.  Nursing staff attended to him, he was not syncopal.  Vital signs include blood pressure 140/88 and heart rate 70s.  Oxygen saturations 99% on room air.  He was advised to stay in the eating room and drink some fluids and reassess also discussed possibility of being seen in the emergency room.  He subsequently decided that he was feeling better and decided to leave the office.  He was directed to return and go to the emergency room if he started to feel unwell.    Plan:    1. CAD (coronary artery disease)  Lipid Profile    Basic metabolic panel    Thyroid Stimulating Hormone (TSH)    Magnesium    atorvastatin (LIPITOR) 80 MG tablet    metoprolol succinate (TOPROL-XL) 100 MG 24 hr tablet    [facility] - Today    Lipid Profile    Basic metabolic panel    Thyroid Stimulating Hormone (TSH)    Magnesium   2. CAD (coronary artery disease)  Lipid Profile    Basic metabolic panel    Thyroid Stimulating Hormone (TSH)    Magnesium    atorvastatin (LIPITOR) 80 MG tablet    metoprolol succinate (TOPROL-XL) 100 MG 24 hr tablet    [facility] - Today    Lipid Profile    Basic metabolic panel    Thyroid Stimulating Hormone (TSH)    Magnesium    Added automatically from request for surgery [id number]   3. S/P coronary artery stent placement  atorvastatin (LIPITOR) 80 MG tablet   4. Essential hypertension  isosorbide mononitrate (IMDUR) 30 MG 24 hr tablet    hydrALAZINE (APRESOLINE) 50 MG tablet   5. Systolic congestive heart failure, unspecified congestive heart failure chronicity (H)  hydrALAZINE (APRESOLINE) 50 MG tablet   6. Ventricular tachycardia (H)  rivaroxaban 15 mg Tab   7. ICD (implantable cardioverter-defibrillator) in place  rivaroxaban 15 mg Tab   8. Persistent atrial fibrillation (H)  rivaroxaban 15 mg Tab         An After Visit Summary was printed and given to the patient.    Subjective:    Dillon Pennington is a 63 y.o. male who returned for a planned  follow up visit.  He has had a complicated hospital course.  I met  him on one occasion February 2018 he was admitted with atrial fibrillation with rapid ventricular response and increased heart rate.  He has a history of coronary artery disease with drug-eluting stent to the LAD and diagonal branch in mid circumflex April 2018.  He has a history of ischemic cardia myopathy with ejection fraction of 20%.  He has a history of paroxysmal atrial fibrillation and a history of ventricular fibrillation with ICD insertion April 2018.    H has been feeling well.  He states that he feels much better than when he was in the hospital.  He is not describing chest pain, shortness of breath, orthopnea, or PND.  He had a repeat echocardiogram July 2018 with improvement ejection fraction up to 35%.    Medications have been administered by a home health nurse.  He states she has been taking his medications regularly.  I renewed the medications that were requested but I noted that mexiletine was not in his bag.  Patient is not certain whether he has been taking mexiletine or not and I have asked this to be further clarified with a note today sent home with him that he states his wife and his daughter are able to follow through with.    ICD interrogation today demonstrates normal function.  He has 2% burden of atrial fibrillation.  No significant ventricular dysrhythmias reported.    Review of Systems:   General: Weight Gain  Eyes: WNL  Ears/Nose/Throat: WNL  Lungs: WNL  Heart: WNL  Stomach: WNL  Bladder: Frequent Urination at Night  Muscle/Joints: WNL  Skin: WNL  Nervous System: WNL  Mental Health: WNL     Blood: WNL      Problem List:    Patient Active Problem List   Diagnosis     Chronic kidney disease (CKD), stage III (moderate)     Acute on chronic systolic heart failure (H)     Dilated cardiomyopathy (H)     Acute renal failure superimposed on stage 3 chronic kidney disease, unspecified acute renal failure type (H)     Persistent atrial fibrillation (H)     VF (ventricular fibrillation) (H)      Coronary artery disease involving native coronary artery of native heart without angina pectoris     Ventricular tachycardia (H)     Anemia     Dyslipidemia, goal LDL below 70     ICD (implantable cardioverter-defibrillator) in place       Social History     Social History     Marital status:      Spouse name: Soto Rivera     Number of children: N/A     Years of education: N/A     Occupational History     Not on file.     Social History Main Topics     Smoking status: Never Smoker     Smokeless tobacco: Never Used     Alcohol use Yes      Comment: very rare      Drug use: No     Sexual activity: Not on file     Other Topics Concern     Not on file     Social History Narrative    The patient's son was present to provide the HPI.       No family history on file.    Current Outpatient Prescriptions   Medication Sig Dispense Refill     acetaminophen-codeine (TYLENOL #3) 300-30 mg per tablet Take 1 tablet by mouth daily as needed.  0     allopurinol (ZYLOPRIM) 100 MG tablet Take 50 mg by mouth daily. Indications: prevention of acute gout attack       atorvastatin (LIPITOR) 80 MG tablet Take 1 tablet (80 mg total) by mouth daily. 90 tablet 3     clopidogrel (PLAVIX) 75 mg tablet Take 1 tablet (75 mg total) by mouth daily. 90 tablet 3     furosemide (LASIX) 20 MG tablet Take 1 tablet (20 mg total) by mouth daily. 90 tablet 3     hydrALAZINE (APRESOLINE) 50 MG tablet Take 1 tablet (50 mg total) by mouth 3 (three) times a day. 270 tablet 3     isosorbide mononitrate (IMDUR) 30 MG 24 hr tablet Take 1 tablet (30 mg total) by mouth daily. 90 tablet 3     levothyroxine (SYNTHROID, LEVOTHROID) 100 MCG tablet Take 100 mcg by mouth daily.       metoprolol succinate (TOPROL-XL) 100 MG 24 hr tablet Take 2 tablets (200 mg total) by mouth daily. 180 tablet 4     mexiletine (MEXITIL) 150 MG capsule Take 1 capsule (150 mg total) by mouth every 8 (eight) hours. 30 capsule 0     nitroglycerin (NITROSTAT) 0.4 MG SL tablet Place 0.4 mg  "under the tongue every 5 (five) minutes as needed for chest pain.       oxyCODONE-acetaminophen (PERCOCET) 5-325 mg per tablet Take 1 tablet by mouth every 4 (four) hours as needed for pain. 12 tablet 0     predniSONE (DELTASONE) 10 mg tablet Take 2 tablets (20 mg total) by mouth daily. 15 tablet 0     rivaroxaban 15 mg Tab Take 1 tablet (15 mg total) by mouth daily with supper. 30 tablet 3     No current facility-administered medications for this visit.        Objective:     /90 (Patient Site: Left Arm, Patient Position: Sitting, Cuff Size: Adult Regular)  Pulse 79  Resp 14  Ht 5' 4\" (1.626 m)  Wt 166 lb (75.3 kg)  BMI 28.49 kg/m2  166 lb (75.3 kg)       Physical Exam:    GENERAL APPEARANCE: alert, no apparent distress  HEENT: no scleral icterus or xanthelasma  NECK: jugular venous pressure within normal limits  CHEST: symmetric, the lungs are clear to auscultation the device site appears well-healed  CARDIOVASCULAR: regular rhythm without significant murmur, click, or gallop; no carotid bruits  Abdomen: No Organomegaly, masses, bruits, or tenderness. Bowels sounds are present      EXTREMITIES: no cyanosis, clubbing or edema    Cardiac Testing:     Summary        Left ventricular systolic function is moderately decreased with an LVEF of 30-35%    Normal right ventricular size and systolic function.    ICD lead is present in the ventricle.    Mild to moderate mitral regurgitation.    When compared to the previous study dated 4/20/2018, the left ventricular systolic function has increased from 20 to 30-35% and the degre of mitral regurgitation has decreased.         Procedure Status      Procedure scheduled as Urgent (Inpatient).          Conclusion           Estimated blood loss was <20 ml.    1st Diag lesion 85% stenosed.    A drug eluting stent was successfully placed.    Mid LAD lesion 99% stenosed.    A drug eluting stent was successfully placed.    Mid Cx lesion 90% stenosed.    A stent was " successfully placed.    The LM vessel was moderate.      Pt with ischemic CM CKD with long discussion with hte family today re CABG vs PCI and family wished to have PCI     Procedure performed with Dr. Cheung and myself     Access:  Left radial for coronary access  Right femoral access with US preclose placed 14 F and impella insertion prior to PCI     Angiography:  LM min dz ostium  LAD severe dz bolivar glesion  Mid and in diagonal, far distal severe diffuse dz  Circ mid severe dz     PCI:  1. Deployed two DEJUAN to diagonal  2. Deployed two DEJUAN to mid LAD (long lesion), post IVUS confirmed aposition  3. Deployed DEJUAN to mid Circ     Removed impella with use perclose sutures with 3+ dorsalis pulse post     Imp/plan:  1. Ischemic CM - s/p PCI LAD/Circ with DEJUAN - asp/plavix, stop heparin, carvedilol, statin (titrate up carvedilol as tolerated)  2. VT/VF - possible polymorphic VT from ischemic and/or low magnesium on amiodarone with preexisiting risk - discuss with EP re secondary prevention with ICD  3. Afib - in SR on amiodoane, cont for now iv over night then change to oral tomorrow.  4. MR - moderate - repeat echo   5. Severe CKD - 202mL iodixanol -cont hydration today,nephrology following         EKG demonstrates electronic atrial pacing.  Left ventricular hypertrophy by voltage, nonspecific T-wave changes, QT only prolonged      Lab Results:    Lab Results   Component Value Date     05/22/2018    K 4.0 05/22/2018     05/22/2018    CO2 25 05/22/2018    BUN 41 (H) 05/22/2018    CREATININE 2.09 (H) 05/22/2018    CALCIUM 9.3 05/22/2018     Lab Results   Component Value Date    CHOL 198 04/14/2018    TRIG 127 04/14/2018    HDL 44 04/14/2018     BNP (pg/mL)   Date Value   05/16/2018 81 (H)   04/17/2018 335 (H)   04/12/2018 956 (H)     Creatinine (mg/dL)   Date Value   05/22/2018 2.09 (H)   05/16/2018 2.02 (H)   04/25/2018 1.71 (H)   04/24/2018 1.54 (H)     LDL Calculated (mg/dL)   Date Value   04/14/2018 129      Lab Results   Component Value Date    WBC 8.6 05/16/2018    HGB 11.5 (L) 05/16/2018    HCT 34.3 (L) 05/16/2018    MCV 90 05/16/2018     05/16/2018               This note has been dictated using voice recognition software. Any grammatical or context distortions are unintentional and inherent to the software.      Parth Russo M.D., F.A.C.C.  [facility] [place] [phone]

## 2021-06-19 NOTE — PROGRESS NOTES
In clinic device check with Dr. Russo.  Please see link for full device report.  Patient was informed of results and next follow up during today's visit.

## 2021-06-20 NOTE — PROGRESS NOTES
Type:  Atrial Arrhythmia [place] of at least 24.0 hours in a 24 hour period.  Presenting: Atrial fibrillation with ventricular sensing and pacing, average rate ~90bpm, intermittent atrial undersensing noted.   Lead/Battery Status: Stable.   Atrial Arrhythmias: Since 9/23/18, 24 mode switches detected, current episode in progress since 9/29/18, burden is 27%, v-response to atrial sense graph shows v-rates are >/=120bpm~35%.   Vent Arrhythmias: Since 9/23/18, 14 ventricular high rate episodes, available EGMs appear RVR, noted that 5 episodes untreated where RhythmID withheld therapy appropriately.   Anticoagulant: Per EMR, patient is taking Rivaroxaban.   Comments: Normal device function. Routed to Device RN for review.   Alerts: None. CAH  ADD: transmission reviewed, Known PAF with some RVR. No inappropriate therapies. Needs follow up in AF clinic per Dr. Russo.  Device schedulers notified and he is now set up to see EP NPs this Thursday with Device Check as well.     Will review device settings with Dr. Rivera as RVR (>200 bpm) is overlapping with VT zone currently set at 185 bpm.  Rhythm ID discriminator is programmed on and effective thus far.      bpm Scan Ramp 41 J, 41 J, 41 Jx4   bpm ATP 41 J, 41 J, 41 Jx6      Rama Zhu RN

## 2021-06-20 NOTE — PROGRESS NOTES
In clinic device check with Device RN and follow-up with Kelechi Rubalcava, AMBER, CNP.  Please see link for full device report.  Patient was informed of results and next follow up during today's visit.

## 2021-06-20 NOTE — LETTER
Letter by Elizabeth Corbin RN at      Author: Elizabeth Corbin RN Service: -- Author Type: --    Filed:  Encounter Date: 7/28/2020 Status: (Other)         Dillon Pennington  [address] [place] [address]      July 28, 2020      Dear Mr. Pennington,    RE: Remote Results    We are writing to you regarding your recent Remote ICD check from home. Your transmission was received successfully. Battery status is satisfactory at this time.     Your results are within normal limits.    Your next device appointment will be a remote check on 10/28/2020; this will occur automatically.    To schedule or reschedule, please call [phone] and press 1.    NOTE: If you would like to do an extra transmission, please call [phone] and press 3 to speak to a nurse BEFORE transmitting. This ensures that the Device Clinic staff is aware of the reason you are sending a transmission, and can follow-up with you after it has been reviewed.    We will be checking your implanted device from home (remotely) every three months unless otherwise instructed. We will need to see you in the clinic at least once a year. You may need to be seen in the clinic sooner depending on the results of your check.    Please be aware:    The follow-up schedule is like a Physician prescription.    Your remote monitor is paired to your specific implanted device.      Sincerely,    [facility]

## 2021-06-20 NOTE — LETTER
Letter by Kim Hunt RN at      Author: Kim Hunt RN Service: -- Author Type: --    Filed:  Encounter Date: 4/30/2020 Status: (Other)         Dillon Pennington  [address] [place] [address]      April 30, 2020      Dear Mr. Pennington,    RE: Remote Results    We are writing to you regarding your recent Remote ICD check from home. Your transmission was received successfully. Battery status is satisfactory at this time.     Your results are showing no significant changes.    Your next device appointment will be a remote check on 7/28/2020; this will occur automatically.    To schedule or reschedule, please call [phone] and press 1.    NOTE: If you would like to do an extra transmission, please call [phone] and press 3 to speak to a nurse BEFORE transmitting. This ensures that the Device Clinic staff is aware of the reason you are sending a transmission, and can follow-up with you after it has been reviewed.    We will be checking your implanted device from home (remotely) every three months unless otherwise instructed. We will need to see you in the clinic at least once a year. You may need to be seen in the clinic sooner depending on the results of your check.    Please be aware:    The follow-up schedule is like a Physician prescription.    Your remote monitor is paired to your specific implanted device.      Sincerely,    [facility]

## 2021-06-21 NOTE — LETTER
Letter by Lissette Palma RDCS at      Author: Lissette Palma RDCS Service: -- Author Type: --    Filed:  Encounter Date: 3/9/2021 Status: (Other)         Dillon Pennington  [address] [place] [address]      March 9, 2021      Dear Mr. Pennington,    RE: Remote Results    We are writing to you regarding your recent Remote ICD check from home. Your transmission was received successfully. Battery status is satisfactory at this time.     Your results are showing no significant changes.    Your next device appointment will be a clinic visit.  Please call in March to schedule.      To schedule or reschedule, please call [phone] and press 1.    NOTE: If you would like to do an extra transmission, please call [phone] and press 3 to speak to a nurse BEFORE transmitting. This ensures that the Device Clinic staff is aware of the reason you are sending a transmission, and can follow-up with you after it has been reviewed.    We will be checking your implanted device from home (remotely) every three months unless otherwise instructed. We will need to see you in the clinic at least once a year. You may need to be seen in the clinic sooner depending on the results of your check.    Please be aware:    The follow-up schedule is like a Physician prescription.    Your remote monitor is paired to your specific implanted device.      Sincerely,    [facility]

## 2021-06-21 NOTE — LETTER
Letter by Viji Marks RN at      Author: Viji Marks RN Service: -- Author Type: --    Filed:  Encounter Date: 10/15/2020 Status: (Other)         Dillon Pennington  [address] [place] [address]             October 15, 2020         Dear Mr. Pennington,    Below are the results from your recent visit:    Resulted Orders   Comprehensive Metabolic Panel   Result Value Ref Range    Sodium 143 136 - 145 mmol/L    Potassium 3.9 3.5 - 5.0 mmol/L    Chloride 111 (H) 98 - 107 mmol/L    CO2 22 22 - 31 mmol/L    Anion Gap, Calculation 10 5 - 18 mmol/L    Glucose 78 70 - 125 mg/dL    BUN 22 8 - 22 mg/dL    Creatinine 1.59 (H) 0.70 - 1.30 mg/dL    GFR MDRD Af Amer 53 (L) >60 mL/min/1.73m2    GFR MDRD Non Af Amer 44 (L) >60 mL/min/1.73m2    Bilirubin, Total 0.8 0.0 - 1.0 mg/dL    Calcium 9.0 8.5 - 10.5 mg/dL    Protein, Total 6.9 6.0 - 8.0 g/dL    Albumin 3.5 3.5 - 5.0 g/dL    Alkaline Phosphatase 120 45 - 120 U/L    AST 27 0 - 40 U/L    ALT 20 0 - 45 U/L    Narrative    Fasting Glucose reference range is 70-99 mg/dL per  American Diabetes Association (ADA) guidelines.   Lipid Profile   Result Value Ref Range    Triglycerides 139 <=149 mg/dL    Cholesterol 127 <=199 mg/dL    LDL Calculated 61 <=129 mg/dL    HDL Cholesterol 38 (L) >=40 mg/dL    Patient Fasting > 8hrs? Unknown       Laboratory results look good.  LDL is at goal and chemistries look good.  Renal function is stable to  improved.  Liver function tests look normal as well.    Please call with questions or contact us using [employer].    Sincerely,        Electronically signed by Viji COLLINS RN for Dr Russo

## 2021-06-21 NOTE — LETTER
Letter by Ginger Reynolds at      Author: Ginger Reynolds Service: -- Author Type: --    Filed:  Encounter Date: 12/8/2020 Status: (Other)         Dillon Pennington  [address] [place] [address]      December 8, 2020      Dear Mr. Pennington,    RE: Remote Results    We are writing to you regarding your recent Remote ICD check from home. Your transmission was received successfully. Battery status is satisfactory at this time.     Your results are within normal limits.    Your next device appointment will be a remote check on March 9, 2021; this will occur automatically.    To schedule or reschedule, please call [phone] and press 1.    NOTE: If you would like to do an extra transmission, please call [phone] and press 3 to speak to a nurse BEFORE transmitting. This ensures that the Device Clinic staff is aware of the reason you are sending a transmission, and can follow-up with you after it has been reviewed.    We will be checking your implanted device from home (remotely) every three months unless otherwise instructed. We will need to see you in the clinic at least once a year. You may need to be seen in the clinic sooner depending on the results of your check.    Please be aware:    The follow-up schedule is like a Physician prescription.    Your remote monitor is paired to your specific implanted device.      Sincerely,    [facility]

## 2021-06-21 NOTE — PROGRESS NOTES
[facility] Progress Note    Assessment:  1.  Ischemic cardiomyopathy with coronary intervention April 2018 as described.  Echocardiogram did demonstrate improvement in systolic function.  He is not experiencing anginal chest discomfort.  He remains on a combination of Plavix and Xarelto.  Talked about the increased risk of bleeding.  Talked about avoiding aspirin and Motrin and other like medications.    2.  Paroxysmal atrial fibrillation.  He is not aware of palpitations.  I appreciate input from Kelechi Rubalcava in the atrial fibrillation clinic with up titration of metoprolol.  Recent device check shows some persistent increase in heart rate.  Awaiting repeat device check October 31, 2018 as outlined.  He will need close monitoring of his renal function given the utilization of Xarelto which was started when he was in hospital.    3.  Hyperlipidemia he is on atorvastatin 80 mg daily.  DL cholesterol completed August 2018 73.  Plan: As outlined above with plan follow-up of his device for heart rate control at the end of October and request follow-up in 1 month with nurse practitioner.    1. Dilated cardiomyopathy (H)     2. Coronary artery disease involving native coronary artery of native heart without angina pectoris     3. Dyslipidemia, goal LDL below 70     4. Chronic systolic heart failure (H)     5. ICD (implantable cardioverter-defibrillator) in place, dual chamber           An After Visit Summary was printed and given to the patient.    Subjective:    Dillon Pennington is a 63 y.o. male who returned for a planned  follow up visit.  He is seen with the help of the .  I appreciate recent notes from Kelechi Rubalcava in the atrial fibrillation clinic regarding up titration of metoprolol.  He is scheduled for repeat device check to document heart rate control October 31, 2018.  Reports feeling very well.  He has had no complaints of chest pain, dizziness, shortness of breath, orthopnea, or  PND.  His wife confirms that he is been much improved with the treatment over the past number of months.  Recently he was seen in the atrial fibrillation clinic because of periods of atrial fibrillation with increased ventricular response.  Metoprolol was increased to 250 mg in the evening.  He is tolerating this change.    He has a history of coronary artery disease and underwent drug-eluting stent to the LAD and diagonal branch mid circumflex April 2018.  With LV dysfunction with an ejection fraction of 20% improved to 35% on most recent echocardiogram.  He has some chronic renal insufficiency and has been on Xarelto which was administered when he left hospital in addition to Plavix.    Review of Systems:   General: Weight Gain  Eyes: Visual Distubance  Ears/Nose/Throat: WNL  Lungs: Cough, Wheezing  Heart: WNL  Stomach: WNL  Bladder: Frequent Urination at Night  Muscle/Joints: Joint Pain  Skin: WNL  Nervous System: WNL  Mental Health: Confusion     Blood: WNL      Problem List:    Patient Active Problem List   Diagnosis     Chronic kidney disease (CKD), stage III (moderate) (H)     Chronic systolic heart failure (H)     Dilated cardiomyopathy (H)     Acute renal failure superimposed on stage 3 chronic kidney disease, unspecified acute renal failure type (H)     Persistent atrial fibrillation (H)     VF (ventricular fibrillation) (H)     Coronary artery disease involving native coronary artery of native heart without angina pectoris     Ventricular tachycardia (H)     Anemia     Dyslipidemia, goal LDL below 70     ICD (implantable cardioverter-defibrillator) in place, dual chamber       Social History     Social History     Marital status:      Spouse name: Soto Rivera     Number of children: N/A     Years of education: N/A     Occupational History     Not on file.     Social History Main Topics     Smoking status: Never Smoker     Smokeless tobacco: Never Used     Alcohol use Yes      Comment: very rare       Drug use: No     Sexual activity: Not on file     Other Topics Concern     Not on file     Social History Narrative    The patient's son was present to provide the HPI.       No family history on file.    Current Outpatient Prescriptions   Medication Sig Dispense Refill     acetaminophen (TYLENOL EXTRA STRENGTH) 500 MG tablet Take 500 mg by mouth every 6 (six) hours as needed for pain. Indications: Pain       acetaminophen-codeine (TYLENOL #3) 300-30 mg per tablet Take 1 tablet by mouth every 4 (four) hours as needed for pain. Take 1 pill every 4-6 hours for pain per ALEJANDRA Woodruff PA-C  Indications: Pain       allopurinol (ZYLOPRIM) 100 MG tablet Take 50 mg by mouth daily. Indications: prevention of acute gout attack       atorvastatin (LIPITOR) 80 MG tablet Take 1 tablet (80 mg total) by mouth daily. 90 tablet 3     clopidogrel (PLAVIX) 75 mg tablet Take 1 tablet (75 mg total) by mouth daily. 90 tablet 3     furosemide (LASIX) 20 MG tablet Take 1 tablet (20 mg total) by mouth daily. 90 tablet 3     hydrALAZINE (APRESOLINE) 50 MG tablet Take 1 tablet (50 mg total) by mouth 3 (three) times a day. 270 tablet 3     isosorbide mononitrate (IMDUR) 30 MG 24 hr tablet Take 1 tablet (30 mg total) by mouth daily. 90 tablet 3     levothyroxine (SYNTHROID) 88 MCG tablet Take 88 mcg by mouth Daily at 6:00 am. Indications: hypothyroidism       metoprolol succinate (TOPROL-XL) 100 MG 24 hr tablet Take 2.5 tablets (250 mg total) by mouth every evening. 225 tablet 4     nitroglycerin (NITROSTAT) 0.4 MG SL tablet Place 1 tablet (0.4 mg total) under the tongue every 5 (five) minutes as needed for chest pain. 1 Bottle 3     predniSONE (DELTASONE) 20 MG tablet Take 20 mg by mouth daily. Per ALEJANDRA Woodruff PA-C Take 20mg daily for gout  Indications: acute gouty arthritis       rivaroxaban 15 mg Tab Take 1 tablet (15 mg total) by mouth daily with supper. 30 tablet 3     No current facility-administered medications for this visit.        Objective:  "    /70 (Patient Site: Left Arm, Patient Position: Sitting, Cuff Size: Adult Regular)  Pulse 76  Resp 14  Ht 5' 4\" (1.626 m)  Wt 159 lb (72.1 kg)  BMI 27.29 kg/m2  159 lb (72.1 kg)       Physical Exam:    GENERAL APPEARANCE: alert, no apparent distress  HEENT: no scleral icterus or xanthelasma  NECK: jugular venous pressure normal limits  CHEST: symmetric, the lungs are clear to auscultation, no rales rhonchi or wheeze detected  CARDIOVASCULAR: regular rhythm without significant murmur.; no carotid bruits  Abdomen: No Organomegaly, masses, bruits, or tenderness. Bowels sounds are present      EXTREMITIES: no cyanosis, clubbing or edema    Cardiac Testing:    Left ventricular systolic function is moderately decreased with an LVEF of 30-35%    Normal right ventricular size and systolic function.    ICD lead is present in the ventricle.    Mild to moderate mitral regurgitation.    When compared to the previous study dated 4/20/2018, the left ventricular systolic function has increased from 20 to 30-35% and the degre of mitral regurgitation has decreased.      Left radial for coronary access  Right femoral access with US preclose placed 14 F and impella insertion prior to PCI      Angiography:  LM min dz ostium  LAD severe dz bolivar glesion  Mid and in diagonal, far distal severe diffuse dz  Circ mid severe dz      PCI:  1. Deployed two DEJUAN to diagonal  2. Deployed two DEJUAN to mid LAD (long lesion), post IVUS confirmed aposition  3. Deployed DEJUAN to mid Circ      Removed impella with use perclose sutures with 3+ dorsalis pulse post      Imp/plan:  1. Ischemic CM - s/p PCI LAD/Circ with DEJUAN - asp/plavix, stop heparin, carvedilol, statin (titrate up carvedilol as tolerated)  2. VT/VF - possible polymorphic VT from ischemic and/or low magnesium on amiodarone with preexisiting risk - discuss with EP re secondary prevention with ICD  3. Afib - in SR on amiodoane, cont for now iv over night then change to oral " tomorrow.  4. MR - moderate - repeat echo   5. Severe CKD - 202mL iodixanol -cont hydration today,nephrology following           EKG demonstrates electronic atrial pacing.  Left ventricular hypertrophy by voltage, nonspecific T-wave changes, QT only prolonged        Lab Results:    Lab Results   Component Value Date     08/17/2018    K 4.4 08/17/2018     (H) 08/17/2018    CO2 25 08/17/2018    BUN 32 (H) 08/17/2018    CREATININE 1.78 (H) 08/17/2018    CALCIUM 9.0 08/17/2018     Lab Results   Component Value Date    CHOL 156 08/17/2018    TRIG 201 (H) 08/17/2018    HDL 43 08/17/2018     BNP (pg/mL)   Date Value   05/16/2018 81 (H)   04/17/2018 335 (H)   04/12/2018 956 (H)     Creatinine (mg/dL)   Date Value   08/17/2018 1.78 (H)   05/22/2018 2.09 (H)   05/16/2018 2.02 (H)   04/25/2018 1.71 (H)     LDL Calculated (mg/dL)   Date Value   08/17/2018 73   04/14/2018 129     Lab Results   Component Value Date    WBC 8.6 05/16/2018    HGB 11.5 (L) 05/16/2018    HCT 34.3 (L) 05/16/2018    MCV 90 05/16/2018     05/16/2018               This note has been dictated using voice recognition software. Any grammatical or context distortions are unintentional and inherent to the software.      Parth Russo M.D., F.A.C.C.  [facility]  [phone]

## 2021-06-21 NOTE — LETTER
Letter by Kim Hunt RN at      Author: Kim Hunt RN Service: -- Author Type: --    Filed:  Encounter Date: 10/28/2020 Status: (Other)         Dillon Pennington  [address] [place] [address]      October 28, 2020      Dear Mr. Pennington,    RE: Remote Results    We are writing to you regarding your recent Remote ICD check from home. Your transmission was received successfully. Battery status is satisfactory at this time.     Your results are within normal limits.    Your next device appointment will be a clinic visit.  Please call in November to schedule.  with device and Dr. Rivera.     To schedule or reschedule, please call [phone] and press 1.    NOTE: If you would like to do an extra transmission, please call [phone] and press 3 to speak to a nurse BEFORE transmitting. This ensures that the Device Clinic staff is aware of the reason you are sending a transmission, and can follow-up with you after it has been reviewed.    We will be checking your implanted device from home (remotely) every three months unless otherwise instructed. We will need to see you in the clinic at least once a year. You may need to be seen in the clinic sooner depending on the results of your check.    Please be aware:    The follow-up schedule is like a Physician prescription.    Your remote monitor is paired to your specific implanted device.      Sincerely,    [facility]

## 2021-06-22 NOTE — PROGRESS NOTES
[facility] Heart Care Note    Assessment:  Dual-chamber ICD implanted April 24, 2018  Episodes of recurrent follicular fibrillation  Advanced ischemic cardiomyopathy  Echocardiogram July 11, 2018 showed improvement in ejection fraction; earlier 20% now 30-35% there is also less mitral regurgitation number next is frequent episodes of atrial fibrillation with elevated ventricular response  Chronic anticoagulation with Xarelto 15 mg; dose reduced because of renal insufficiency  Chronic renal failure; creatinine 1.78  Hyperlipidemia well-controlled on high-dose atorvastatin with recent cholesterol 156, LDL 73    Plan:    Continue the very comprehensive medical program-no medical adjustments were made today  Continue to have device check through transtelephonic monitoring every 3 months  Because of atrial fibrillation, important to continue anticoagulation which will be Xarelto 15 mg daily to be taken with evening meals  Continue furosemide 20 mg daily; should he have increase breathlessness or increase of edema, an extra dose of furosemide should be given  Continue to medications outlined by his visiting nurse-apparently has them all placed in a weekly pill minder  Device check through transtelephonic monitoring every 3 months  Continue regular follow-ups with Dr. Russo for general cardiac issues  Return in 1 year for cardiac arrhythmia device assessment  If atrial fibrillation ventricular rate cannot be well controlled, we may need to consider AV node ablation        Subjective:    I had the opportunity to see.Dillon Pennington , who is a 63 y.o. male with a known history of advanced ischemic cardiomyopathy and recurrent ventricular fibrillation  He has been comfortable, seems to do all his day-to-day activities without much trouble  Is satisfied with his exercise capacity, endurance and ability to do physical work  No chest pain  Denies orthopnea PND or pedal edema  Is unaware of palpitations and has had no syncopal or  near syncopal episodes, no shocks from his defibrillator  August 17, 2018 creatinine 1.78, electrolytes were normal  Cholesterol 156 with HDL 43, LDL 73    He is on many medications; these are sent out with his visiting nurse  I assume they are correctly administered when I look through his pills bottles, and he had triplets of furosemide and couple of bottles of other medicines      Problem List:  Patient Active Problem List   Diagnosis     Chronic kidney disease (CKD), stage III (moderate) (H)     Chronic systolic heart failure (H)     Dilated cardiomyopathy (H)     Persistent atrial fibrillation (H)     VF (ventricular fibrillation) (H)     Coronary artery disease involving native coronary artery of native heart without angina pectoris     Ventricular tachycardia (H)     Anemia     Dyslipidemia, goal LDL below 70     ICD (implantable cardioverter-defibrillator) in place, dual chamber     Medical History:  Past Medical History:   Diagnosis Date     DMITRI (acute kidney injury) (H)      CKD (chronic kidney disease)      Dyslipidemia, goal LDL below 70 4/16/2018     Mushroom poisoning 9/11/2006     Nonischemic cardiomyopathy (H)      Paroxysmal atrial fibrillation (H)      Persistent atrial fibrillation (H) 4/12/2018     Surgical History:  Past Surgical History:   Procedure Laterality Date     CHOLECYSTECTOMY       CV CORONARY ANGIOGRAM N/A 4/18/2018    Procedure: Coronary Angiogram;  Surgeon: Heriberto Cheung MD;  Location: [facility] Cath Lab;  Service:      CV CORONARY ANGIOGRAM N/A 4/20/2018    Procedure: Coronary Angiogram;  Surgeon: Javon Carrington MD;  Location: [facility] Cath Lab;  Service:      CV IMPELLA INSERT N/A 4/20/2018    Procedure: Impella Insert;  Surgeon: Javon Carrington MD;  Location: [facility] Cath Lab;  Service:      CV LEFT HEART CATHETERIZATION WO LEFT VETRICULOGRAM Left 4/18/2018    Procedure: Left Heart Catheterization Without Left Ventriculogram;  Surgeon: Heriberto BURGER  MD Jonatan;  Location: [facility] Cath Lab;  Service:      EP ICD INSERT N/A 4/23/2018    Procedure: EP ICD Insert;  Surgeon: Rakesh Rivera MD;  Location: [facility] Cath Lab;  Service:      PICC  4/18/2018          Social History:  Social History     Socioeconomic History     Marital status:      Spouse name: Soto Rivera     Number of children: Not on file     Years of education: Not on file     Highest education level: Not on file   Social Needs     Financial resource strain: Not on file     Food insecurity - worry: Not on file     Food insecurity - inability: Not on file     Transportation needs - medical: Not on file     Transportation needs - non-medical: Not on file   Occupational History     Not on file   Tobacco Use     Smoking status: Never Smoker     Smokeless tobacco: Never Used   Substance and Sexual Activity     Alcohol use: Yes     Comment: very rare      Drug use: No     Sexual activity: Not on file   Other Topics Concern     Not on file   Social History Narrative    The patient's son was present to provide the HPI.       Review of Systems:      General: WNL  Eyes: WNL  Ears/Nose/Throat: WNL  Lungs: Snoring  Heart: WNL  Stomach: WNL  Bladder: Frequent Urination at Night  Muscle/Joints: Joint Pain, Muscle Weakness  Skin: WNL  Nervous System: WNL  Mental Health: WNL     Blood: WNL        Family History:  No family history on file.      Allergies:  No Known Allergies    Medications:  Current Outpatient Medications   Medication Sig Dispense Refill     allopurinol (ZYLOPRIM) 100 MG tablet Take 50 mg by mouth daily. Indications: prevention of acute gout attack       atorvastatin (LIPITOR) 80 MG tablet Take 1 tablet (80 mg total) by mouth daily. 90 tablet 3     clopidogrel (PLAVIX) 75 mg tablet Take 1 tablet (75 mg total) by mouth daily. 90 tablet 3     furosemide (LASIX) 20 MG tablet Take 1 tablet (20 mg total) by mouth daily. 90 tablet 3     hydrALAZINE (APRESOLINE) 50 MG tablet Take 1 tablet  "(50 mg total) by mouth 3 (three) times a day. 270 tablet 3     isosorbide mononitrate (IMDUR) 30 MG 24 hr tablet Take 1 tablet (30 mg total) by mouth daily. 90 tablet 3     levothyroxine (SYNTHROID) 88 MCG tablet Take 88 mcg by mouth Daily at 6:00 am. Indications: hypothyroidism       metoprolol succinate (TOPROL-XL) 100 MG 24 hr tablet Take 3 tablets (300 mg total) by mouth every evening. 270 tablet 3     nitroglycerin (NITROSTAT) 0.4 MG SL tablet Place 1 tablet (0.4 mg total) under the tongue every 5 (five) minutes as needed for chest pain. 1 Bottle 3     rivaroxaban 15 mg Tab Take 1 tablet (15 mg total) by mouth daily with supper. 30 tablet 3     acetaminophen (TYLENOL EXTRA STRENGTH) 500 MG tablet Take 500 mg by mouth every 6 (six) hours as needed for pain. Indications: Pain       predniSONE (DELTASONE) 20 MG tablet Take 20 mg by mouth daily as needed. Per LAEJANDRA Woodruff PA-C Take 20mg daily for gout       No current facility-administered medications for this visit.          Surgical site  ICD is well-healed left subclavicular site    Device interrogation  Ventricular rhythm is atrial fibrillation with ventricular rate in the 70s  Quite a bit of atrial fibrillation identified but paroxysmal, estimating 34%  During atrial fibrillation heart rate is often elevated, above 120 bpm 15-20%  Fast heart rates are atrial fib with elevated ventricular response; no ventricular tachycardia identified  Lead function satisfactory  There is been no therapy from the ICD is  Better good for another 10.5 years  We did change the atrial sensitivity to prevent undersensing of the atrial fibrillation    Objective:   Vital signs:  /72 (Patient Site: Left Arm, Patient Position: Sitting, Cuff Size: Adult Regular)   Pulse 80   Resp 14   Ht 5' 4\" (1.626 m)   Wt 162 lb (73.5 kg)   BMI 27.81 kg/m        Physical Exam      GENERAL APPEARANCE: Alert, cooperative and in no acute distress.  HEENT: No scleral icterus. No Xanthelasma. Oral " mucous membranes pink and moist.  NECK: JVP normal cm. No Hepatojugular reflux. Thyroid not  Palpable  CHEST: clear to auscultation and percussion  CARDIOVASCULAR: S1, S2 without murmur    Brachial, radial  pulses are intact and symmetric.   No carotid bruits noted.  ABDOMEN: Non tender. BS+. No bruits.  EXTREMITIES: No cyanosis, clubbing or edema.    Lab Results:  LIPIDS:  Lab Results   Component Value Date    CHOL 156 08/17/2018    CHOL 198 04/14/2018     Lab Results   Component Value Date    HDL 43 08/17/2018    HDL 44 04/14/2018     Lab Results   Component Value Date    LDLCALC 73 08/17/2018    LDLCALC 129 04/14/2018     Lab Results   Component Value Date    TRIG 201 (H) 08/17/2018    TRIG 127 04/14/2018     No components found for: CHOLHDL    BMP:  Lab Results   Component Value Date    CREATININE 1.78 (H) 08/17/2018    BUN 32 (H) 08/17/2018     08/17/2018    K 4.4 08/17/2018     (H) 08/17/2018    CO2 25 08/17/2018         This note has been dictated using voice recognition software. Any grammatical or context distortions are unintentional and inherent to the software.  Rakesh Rivera MD  [facility]  [phone]

## 2021-06-23 NOTE — PATIENT INSTRUCTIONS - HE
Dillon Pennington,    It was a pleasure to see you today at the [facility].     My recommendations after this visit include:  - Please follow up with Dr Russo in February   - Please follow up with Marion Blunt in 3 months  - No changes to your medications    Marion Blunt, CNP

## 2021-06-25 NOTE — TELEPHONE ENCOUNTER
Type: routine remote ICD transmission.  Presenting rhythm: atrial fibrillation with ventricular sensing, rate 130 bpm.  Battery/lead status: stable  Arrhythmias: since 12/6/18, AF is persistent, burden 100%, V-rates >/=120 bpm 40%. Two episodes in VT zone each treated with ATP x 1-2; EGMs suggest RVR. 560 nonsustained and 904 other untreated episodes in VT zone, available EGMs suggestive of RVR.  Anticoagulant: rivaroxaban  Comments: normal ICD function. Routed to device RN.  Device/lead alerts: none. prd     LVM for patient. He does not speak English very well.   Called his daughter Mónica (speaks english) discussed the remote and the therapies he is getting. Asked her to inquire about his medication compliance and how he has been feeling. Pt is supposed to take 300mg of metoprolol daily. After she speaks with him, she will contact me back. Plan to route to Dr. Rivera for his review of ATP of AF with RVR from 2/17/2019.     Plan   Should see me or Dr. Russo within 1 month  Decide if he needs an   Need to get on a good medical program; she forgets to take his medicines in the evening, he should take his medicines in the morning which may be more reliable

## 2021-06-25 NOTE — TELEPHONE ENCOUNTER
Talked with Daughter Mónica who will be calling her father Dillon to let him know of Dr. Rivera's recommendations.    Agree to plan with follow up in 1 month with either Dr. Rivera or Dr. Russo- pt does not have preference for either one at this time.     Sent device schedulers staff message to make them aware pt will need appointment and also  at the time of clinic appointment.     No further questions or concerns at this time.     Jessica

## 2021-06-25 NOTE — TELEPHONE ENCOUNTER
Spoke with Dr. Rivera who stated he is okay with the patient waiting to be seen until Dr. Russo's next available appointment.     Will forward to scheduling.     Jessica

## 2021-06-25 NOTE — TELEPHONE ENCOUNTER
Type: routine remote ICD transmission.  Presenting rhythm: atrial fibrillation with ventricular sensing, rate 130 bpm.  Battery/lead status: stable  Arrhythmias: since 12/6/18, AF is persistent, burden 100%, V-rates >/=120 bpm 40%. Two episodes in VT zone each treated with ATP x 1-2; EGMs suggest RVR. 560 nonsustained and 904 other untreated episodes in VT zone, available EGMs suggestive of RVR.  Anticoagulant: rivaroxaban  Comments: normal ICD function. Routed to device RN.  Device/lead alerts: none. prd    LVM for patient. He does not speak English very well.   Called his daughter Mónica (speaks english) discussed the remote and the therapies he is getting. Asked her to inquire about his medication compliance and how he has been feeling. Pt is supposed to take 300mg of metoprolol daily. After she speaks with him, she will contact me back. Plan to route to Dr. Rivera for his review of ATP of AF with RVR from 2/17/2019.       Mónica spoke with her mom and dad. She reports that her dad does miss doses of his metoprolol 2-3 times per week. He will often be out in the evening come home and go straight to bed and just forget to take them.   Overall he is been feeling ok recent, reports some shortness of breath and tiredness, of which his Home health nurse asked that he follow up in clinic with is PMD.     Forwarded to Dr. Rivera to make him aware of the two episodes of RVR that were treated with 1-2 bursts of ATP and med compliance. Any changes or recommendations?    Kim Hunt RN BSN PHN  Device Nurse   [facility]

## 2021-06-26 NOTE — PROGRESS NOTES
Progress Notes by Marion Blunt CNP at 5/22/2018  2:50 PM     Author: Marion Blunt CNP Service: -- Author Type: Nurse Practitioner    Filed: 5/22/2018  4:06 PM Encounter Date: 5/22/2018 Status: Signed    : Marion Blunt CNP (Nurse Practitioner)           Click to link to [facility] HEART CARE NOTE      Assessment/Recommendations   Assessment:    1. Ischemic cardiomyopathy with systolic dysfunction, NYHA class III: No signs of fluid retention today. He continues to have fatigue and dyspnea on exertion.  His weights have decreased due to poor appetite and nausea.  He is following a low-sodium diet.  He had an ICD placed April 23, 2018 and has not had his postop device check.  This will be rescheduled today.    2. Persistent atrial fibrillation: Rate controlled. He is on xarelto for anticoagulation    3.  Nausea: He has been suffering from this for the last week or so.  He actually had an emesis during our appointment.  I strongly encouraged follow up with his primary doctor regarding this. His is taking promethazine as needed.    4. Coronary artery disease: Denies chest pain. Had 5 DEJUAN placed on April 20. He continues plavix 75 mg daily and atorvastatin 80 mg daily    Plan:  1.   Heart failure medications:  - Beta blocker therapy with metroprolol succinate 200 mg daily  - Diuretic therapy with furosemide 20 mg daily  - Hydralazine 50 mg 3 times a day and isosorbide mononitrate 30 mrem daily  2. BMP and Mg pending  3. Continue daily weights and low sodium diet  4. Reschedule post op device check    Dillon Pennington will follow up with Dr. Russo next available appointment as he has missed his last two appointments with him, follow up in the heart failure clinic in 6 weeks.     History of Present Illness    Mr. Dillon Pennington is a 63 y.o. male seen at [facility] heart failure clinic today for continued follow-up.  His son accompanies him today.  There is an   for the duration of the appointment.  He follows up for ischemic cardiomyopathy with systolic dysfunction.  His most recent echocardiogram was done April 19, 2018 which showed ejection fraction of 20-25%.  He had an abnormal nuclear stress test on April 16, 2018. He had ventricular tachycardia with PEA arrest requiring CPR and received 3 shocks and epinephrine twice. He had coronary angiogram on April 18 and had multi-vessel disease and cardiac surgery was consulted.  He decided to have intervention instead of surgery.  On April 20, 2018 he had 2 drug-eluting stents to the diagonal, 2 drug-eluting stents to the mid LAD and one drug-eluting stent to the mid circumflex.  On April 21, 2018 he went into ventricular tachycardia arrest and had ICD placement on April 23,2018.     Today, he comes in with continued fatigue, dyspnea on exertion, and dizziness.  He also has poor appetite and nausea.  He was seen in the emergency department last week for this was diagnosed with gastroenteritis.  He has been started on promethazine for the nausea and did not take it today. He had emesis during our appointment.  He denies any orthopnea or PND.  He denies any abdominal bloating or lower extremity edema.  He denies shortness of breath, orthopnea, PND, palpitations, chest pain, abdominal fullness/bloating and lower extremity edema.      He is monitoring home weights which are stable around 143 pounds.  He is following a low sodium diet.      ECHO 4/19/2018: Limited  Summary   1. Limited 2-dimensional echocardiogram.  2. The left ventricle is mildly enlarged. Left ventricular systolic performance is severely reduced. The ejection fraction is estimated to be 20-25%.   3. There is severe global reduction in left ventricular systolic performance.  4. Normal right ventricular size and systolic performance.   5. There is mild left atrial enlargement.              Physical Examination Review of Systems   Vitals:    05/22/18  1447   BP: 102/64   Pulse: 75   Resp: 16     Body mass index is 26 kg/(m^2).  Wt Readings from Last 3 Encounters:   05/22/18 144 lb (65.3 kg)   05/16/18 146 lb (66.2 kg)   05/07/18 146 lb 9.6 oz (66.5 kg)       General Appearance:     Alert, cooperative, emesis during appointment.   ENT/Mouth: membranes moist, no oral lesions or bleeding gums.      EYES:  no scleral icterus, normal conjunctivae   Neck: no carotid bruits or thyromegaly   Chest/Lungs:   lungs are clear to auscultation, no rales or wheezing, respirations unlabored   Cardiovascular:   Regular. Normal first and second heart sounds with no murmurs, rubs, or gallops; the carotid, radial and posterior tibial pulses are intact, Jugular venous pressure normal, no edema    Abdomen:  Soft, nontender, nondistended, bowel sounds present   Extremities: no cyanosis or clubbing   Skin: warm, dry. Incision clean, dry and intact    Neurologic: mood and affect are appropriate, alert and oriented x3      General: Weight Loss  Eyes: Visual Distubance  Ears/Nose/Throat: WNL  Lungs: Cough  Heart: WNL  Stomach: Nausea  Bladder: Frequent Urination at Night  Muscle/Joints: WNL  Skin: WNL  Nervous System: WNL  Mental Health: Confusion     Blood: WNL     Medical History  Surgical History Family History Social History   Past Medical History:   Diagnosis Date   ? DMITRI (acute kidney injury)    ? CKD (chronic kidney disease)    ? Dyslipidemia, goal LDL below 70 4/16/2018   ? Mushroom poisoning 9/11/2006   ? Nonischemic cardiomyopathy    ? Paroxysmal atrial fibrillation    ? Persistent atrial fibrillation 4/12/2018    Past Surgical History:   Procedure Laterality Date   ? CHOLECYSTECTOMY     ? CV CORONARY ANGIOGRAM N/A 4/18/2018    Procedure: Coronary Angiogram;  Surgeon: Heriberto Cheung MD;  Location: [facility] Cath Lab;  Service:    ? CV CORONARY ANGIOGRAM N/A 4/20/2018    Procedure: Coronary Angiogram;  Surgeon: Javon Carrington MD;  Location: [facility] Cath Lab;   Service:    ? CV IMPELLA INSERT N/A 4/20/2018    Procedure: Impella Insert;  Surgeon: Javon Carrington MD;  Location: [facility] Cath Lab;  Service:    ? CV LEFT HEART CATHETERIZATION WO LEFT VETRICULOGRAM Left 4/18/2018    Procedure: Left Heart Catheterization Without Left Ventriculogram;  Surgeon: Heriberto Cheung MD;  Location: [facility] Cath Lab;  Service:    ? EP ICD INSERT N/A 4/23/2018    Procedure: EP ICD Insert;  Surgeon: Rakesh Rivera MD;  Location: [facility] Cath Lab;  Service:    ? PICC  4/18/2018         History reviewed. No pertinent family history. Social History     Social History   ? Marital status:      Spouse name: Soto Rivera   ? Number of children: N/A   ? Years of education: N/A     Occupational History   ? Not on file.     Social History Main Topics   ? Smoking status: Never Smoker   ? Smokeless tobacco: Never Used   ? Alcohol use Yes      Comment: very rare    ? Drug use: No   ? Sexual activity: Not on file     Other Topics Concern   ? Not on file     Social History Narrative          Medications  Allergies   Current Outpatient Prescriptions   Medication Sig Dispense Refill   ? allopurinol (ZYLOPRIM) 100 MG tablet Take 50 mg by mouth daily. Indications: prevention of acute gout attack     ? atorvastatin (LIPITOR) 80 MG tablet Take 1 tablet (80 mg total) by mouth daily. 30 tablet 3   ? CHERATUSSIN AC  mg/5 mL liquid Take 10 mL by mouth every 4 (four) hours as needed.  0   ? clopidogrel (PLAVIX) 75 mg tablet Take 1 tablet (75 mg total) by mouth daily. 30 tablet 3   ? furosemide (LASIX) 20 MG tablet Take 1 tablet (20 mg total) by mouth daily. 90 tablet 3   ? hydrALAZINE (APRESOLINE) 50 MG tablet Take 1 tablet (50 mg total) by mouth 3 (three) times a day. 90 tablet 3   ? isosorbide mononitrate (IMDUR) 30 MG 24 hr tablet Take 1 tablet (30 mg total) by mouth daily. 30 tablet 3   ? levothyroxine (SYNTHROID, LEVOTHROID) 100 MCG tablet Take 100 mcg by mouth daily.     ?  magnesium chloride (SLOW-MAG) 64 mg TbEC delayed-release tablet Take 2 tablets (128 mg total) by mouth daily before lunch.  0   ? metoprolol succinate (TOPROL-XL) 200 MG 24 hr tablet Take 1 tablet (200 mg total) by mouth daily. 30 tablet 3   ? mexiletine (MEXITIL) 150 MG capsule Take 1 capsule (150 mg total) by mouth every 8 (eight) hours. 30 capsule 0   ? nitroglycerin (NITROSTAT) 0.4 MG SL tablet Place 0.4 mg under the tongue every 5 (five) minutes as needed for chest pain.     ? oxyCODONE-acetaminophen (PERCOCET) 5-325 mg per tablet Take 1-2 tablets by mouth every 6 (six) hours as needed for pain. 15 tablet 0   ? promethazine (PHENERGAN) 25 MG tablet Take 1 tablet (25 mg total) by mouth every 6 (six) hours as needed for nausea. 15 tablet 0   ? rivaroxaban 15 mg Tab Take 1 tablet (15 mg total) by mouth daily with supper. 30 tablet 3     No current facility-administered medications for this visit.       No Known Allergies      Lab Results    Chemistry CBC BNP   Lab Results   Component Value Date    CREATININE 2.02 (H) 05/16/2018    BUN 30 (H) 05/16/2018     05/16/2018    K 3.3 (L) 05/16/2018     05/16/2018    CO2 27 05/16/2018     Creatinine (mg/dL)   Date Value   05/16/2018 2.02 (H)   04/25/2018 1.71 (H)   04/24/2018 1.54 (H)   04/23/2018 1.92 (H)    Lab Results   Component Value Date    WBC 8.6 05/16/2018    HGB 11.5 (L) 05/16/2018    HCT 34.3 (L) 05/16/2018    MCV 90 05/16/2018     05/16/2018    Lab Results   Component Value Date    BNP 81 (H) 05/16/2018     BNP (pg/mL)   Date Value   05/16/2018 81 (H)   04/17/2018 335 (H)   04/12/2018 956 (H)          30 minutes were spent with the patient with greater than 50% spent on education and counseling.      Marion Blunt, [facility]   Heart Failure Clinic

## 2021-06-26 NOTE — PROGRESS NOTES
Progress Notes by Alfa Pendleton MD at 6/21/2018  9:20 AM     Author: Alfa Pendleton MD Service: -- Author Type: Physician    Filed: 6/21/2018 10:12 AM Encounter Date: 6/21/2018 Status: Signed    : Alfa Pendleton MD (Physician)           Click to link to [facility] Heart Care     [facility] HEART CARE NOTE       Assessment/Plan:   1.  Paroxysmal atrial fibrillation: The patient had no palpitations.  His atrial fibrillation burden is 8% with relatively controlled ventricular rate.  Continue metoprolol  mg daily.  Continue Xarelto 15 mg daily.  The patient has no bleeding side effect.  Repeat echocardiogram for evaluation of his heart function.      2.  Ischemic cardiomyopathy, LVEF of 20%, chronic systolic congestive heart failure: Clinically the patient has been doing quite well.  He is compensated.  No signs of fluid retention.  Continue Lasix 20 mg daily.  His recent laboratory showed stable creatinine.  Continue metoprolol  mg daily, Imdur 30 mg daily, hydralazine 50 mg 3 times daily.  Again stated post ICD placement.  Repeat echocardiogram to reassess his left ventricular ejection fraction.    3.  Coronary artery disease s/p DEJUAN to LAD/D and mid LCx: No chest pain.  Continue Plavix, Lipitor and metoprolol XL.    4.  S/p ICD: Device interrogation reported as mentioned below.  No ventricular fibrillation, tachycardia.  8% of paroxysmal atrial fibrillation burden with relatively controlled ventricular rate.  The patient has no symptoms.    5.  Essential hypertension: His blood pressure has been well controlled.    6.  Dyslipidemia: Continue Lipitor 80 mg daily.    7.  Chronic kidney disease: Creatinine stable.    The patient does not speak English.  His medical history was translated by a professional Tarisa .  Thank you for the opportunity to be involved in the care of Dillon Pennington. If you have any questions, please feel free to contact me.  The patient will see Dr. Russo in 3 months.    Much or  all of the text in this note was generated through the use of Dragon Dictate voice-to-text software. Errors in spelling or words which seem out of context are unintentional.   Sound alike errors, in particular, may have escaped editing.       History of Present Illness:   It is my pleasure to see Dillon Pennington at the [facility] Care clinic for evaluation of Atrial Fibrillation.  Dillon Pennington is a 63 y.o. male with a medical history of significant the coronary artery disease s/p DEJUAN to LAD/D1 and mid LCx on 4-, ischemic cardiomyopathy, LVEF of 20% per echo on April 20, 2018, paroxysmal atrial fibrillation, history of for ventricular fibrillation s/p ICD insertion on April 23, 2018, chronic kidney disease baseline creatinine 2.0, essential hypertension, dyslipidemia.    The patient was discharged to home in April 25, 2018.  The patient states that he has been doing quite well since hospital discharge.  He denies any chest pain, shortness of breath, palpitations, orthopnea, PND or leg edema.  He has occasional lightheadedness, no syncope.  He had no ICD discharge.  He has been taking his medication regularly.  The patient states that he had no side effects from his current medications.    His ICD was interrogated in clinic today.  He had 8% of atrial fibrillation burden with pretty much controlled ventricular rate.  The patient had no symptoms from paroxysmal atrial fibrillation.  He had no ventricular tachycardia or ventricular fibrillation based on pacemaker interrogation.  Otherwise device function is normal.    Past Medical History:     Patient Active Problem List   Diagnosis   ? Chronic kidney disease (CKD), stage III (moderate)   ? Acute on chronic systolic heart failure (H)   ? Dilated cardiomyopathy (H)   ? Acute renal failure superimposed on stage 3 chronic kidney disease, unspecified acute renal failure type (H)   ? Persistent atrial fibrillation (H)   ? VF (ventricular fibrillation) (H)   ?  Coronary artery disease involving native coronary artery of native heart without angina pectoris   ? Ventricular tachycardia (H)   ? Anemia   ? Dyslipidemia, goal LDL below 70   ? ICD (implantable cardioverter-defibrillator) in place       Past Surgical History:     Past Surgical History:   Procedure Laterality Date   ? CHOLECYSTECTOMY     ? CV CORONARY ANGIOGRAM N/A 4/18/2018    Procedure: Coronary Angiogram;  Surgeon: Heriberto Cheung MD;  Location: [facility] Cath Lab;  Service:    ? CV CORONARY ANGIOGRAM N/A 4/20/2018    Procedure: Coronary Angiogram;  Surgeon: Javon Carrington MD;  Location: [facility] Cath Lab;  Service:    ? CV IMPELLA INSERT N/A 4/20/2018    Procedure: Impella Insert;  Surgeon: Javon Carrington MD;  Location: [facility] Cath Lab;  Service:    ? CV LEFT HEART CATHETERIZATION WO LEFT VETRICULOGRAM Left 4/18/2018    Procedure: Left Heart Catheterization Without Left Ventriculogram;  Surgeon: Heriberto Cheung MD;  Location: [facility] Cath Lab;  Service:    ? EP ICD INSERT N/A 4/23/2018    Procedure: EP ICD Insert;  Surgeon: Rakesh Rivera MD;  Location: [facility] Cath Lab;  Service:    ? PICC  4/18/2018            Family History:   Reviewed: No family history of CAD or heart failure.    Social History:    reports that he has never smoked. He has never used smokeless tobacco. He reports that he drinks alcohol. He reports that he does not use illicit drugs.    Review of Systems:   12 systems are reviewed, negative except for in HPI.    Meds:     Current Outpatient Prescriptions:   ?  allopurinol (ZYLOPRIM) 100 MG tablet, Take 50 mg by mouth daily. Indications: prevention of acute gout attack, Disp: , Rfl:   ?  atorvastatin (LIPITOR) 80 MG tablet, Take 1 tablet (80 mg total) by mouth daily., Disp: 30 tablet, Rfl: 3  ?  clopidogrel (PLAVIX) 75 mg tablet, Take 1 tablet (75 mg total) by mouth daily., Disp: 30 tablet, Rfl: 3  ?  furosemide (LASIX) 20 MG tablet, Take 1 tablet  "(20 mg total) by mouth daily., Disp: 90 tablet, Rfl: 3  ?  hydrALAZINE (APRESOLINE) 50 MG tablet, Take 1 tablet (50 mg total) by mouth 3 (three) times a day., Disp: 90 tablet, Rfl: 3  ?  isosorbide mononitrate (IMDUR) 30 MG 24 hr tablet, Take 1 tablet (30 mg total) by mouth daily., Disp: 30 tablet, Rfl: 3  ?  levothyroxine (SYNTHROID, LEVOTHROID) 100 MCG tablet, Take 100 mcg by mouth daily., Disp: , Rfl:   ?  metoprolol succinate (TOPROL-XL) 200 MG 24 hr tablet, Take 1 tablet (200 mg total) by mouth daily., Disp: 30 tablet, Rfl: 3  ?  oxyCODONE-acetaminophen (PERCOCET) 5-325 mg per tablet, Take 1 tablet by mouth every 4 (four) hours as needed for pain., Disp: 12 tablet, Rfl: 0  ?  rivaroxaban 15 mg Tab, Take 1 tablet (15 mg total) by mouth daily with supper., Disp: 30 tablet, Rfl: 3  ?  mexiletine (MEXITIL) 150 MG capsule, Take 1 capsule (150 mg total) by mouth every 8 (eight) hours., Disp: 30 capsule, Rfl: 0  ?  nitroglycerin (NITROSTAT) 0.4 MG SL tablet, Place 0.4 mg under the tongue every 5 (five) minutes as needed for chest pain., Disp: , Rfl:   ?  predniSONE (DELTASONE) 10 mg tablet, Take 2 tablets (20 mg total) by mouth daily., Disp: 15 tablet, Rfl: 0    Allergies:   Review of patient's allergies indicates no known allergies.      Objective:      Physical Exam  153 lb 12.8 oz (69.8 kg)  5' 4\" (1.626 m)  Body mass index is 26.4 kg/(m^2).  /79 (Patient Site: Left Arm, Patient Position: Sitting, Cuff Size: Adult Regular)  Pulse 80  Resp 16  Ht 5' 4\" (1.626 m)  Wt 153 lb 12.8 oz (69.8 kg)  BMI 26.4 kg/m2    General Appearance:   Awake, Alert, No acute distress.   HEENT:  Pupil equal and reactive to light. No scleral icterus; the mucous membranes were moist.   Neck: No cervical bruits. No JVD. No thyromegaly.     Chest: The spine was straight. The chest was symmetric.   Lungs:   Respirations unlabored; Lungs are clear to auscultation. No crackles. No wheezing.   Cardiovascular:   Regular rhythm and rate, " normal first and second heart sounds with no murmurs. No rubs or gallops.    Abdomen:  Soft. No tenderness. Non-distended. Bowels sounds are present   Extremities: Equal tibial pulses. No leg edema.   Skin: No rashes or ulcers. Warm, Dry.   Musculoskeletal: No tenderness. No deformity.   Neurologic: Mood and affect are appropriate. No focal deficits.         ICD interrogation today: Personally reviewed  He has 70% atrial pacing, 30% paroxysmal atrial fibrillation with controlled ventricular rate in general, 8% of atrial fibrillation burden, otherwise device function normal.    Cardiac Imaging Studies  ECHO on 4-:    Left ventricle ejection fraction is The estimated left ventricular ejection fraction is 20%.    Mitral Valve: Normal mitral valve structure. Moderate to severe mitral regurgitation.    Accuracy of LVEF and severity of mitral regurgitation estimates were significant compromised by ventricular bigeminy throughout the current study    Coronary angiogram with PCI on 4-:  Angiography:  LM min dz ostium  LAD severe dz bolivar glesion  Mid and in diagonal, far distal severe diffuse dz  Circ mid severe dz     PCI:  1. Deployed two DEJUAN to diagonal  2. Deployed two DEJUAN to mid LAD (long lesion), post IVUS confirmed aposition  3. Deployed DEJUAN to mid Circ    Lab Review   Lab Results   Component Value Date     05/22/2018    K 4.0 05/22/2018     05/22/2018    CO2 25 05/22/2018    BUN 41 (H) 05/22/2018    CREATININE 2.09 (H) 05/22/2018    CALCIUM 9.3 05/22/2018     Lab Results   Component Value Date    WBC 8.6 05/16/2018    HGB 11.5 (L) 05/16/2018    HCT 34.3 (L) 05/16/2018    MCV 90 05/16/2018     05/16/2018     Lab Results   Component Value Date    CHOL 198 04/14/2018     Lab Results   Component Value Date    HDL 44 04/14/2018     Lab Results   Component Value Date    LDLCALC 129 04/14/2018     Lab Results   Component Value Date    TRIG 127 04/14/2018     No components found for:  CHOLHDL  Lab Results   Component Value Date    TROPONINI <0.01 05/16/2018     Lab Results   Component Value Date    BNP 81 (H) 05/16/2018     Lab Results   Component Value Date    TSH 2.71 02/18/2018

## 2021-06-26 NOTE — PROGRESS NOTES
Progress Notes by Rachael Rubalcava CNP at 10/4/2018  8:30 AM     Author: Rachael Rubalcava CNP Service: -- Author Type: Nurse Practitioner    Filed: 10/4/2018 12:15 PM Encounter Date: 10/4/2018 Status: Signed    : Rachael Rubalcava CNP (Nurse Practitioner)          Click to link to [facility] Heart Care     [facility] HEART CARE ELECTROPHYSIOLOGY NOTE      Assessment/Recommendations   Assessment/Plan:    Diagnoses and all orders for this visit:    Persistent atrial fibrillation (H) and verified with his family and patient that he is asymptomatic in A. fib.  He feels the same each day.   He has difficulty sleeping at night and is taking metoprolol succinate in the morning and does complain of slight fatigue.  He is briefly lightheaded when he first gets up in the morning but it is before he takes his medication and passes quickly.  He has a medication nurse who sets up his meds every 2 weeks.  She is coming on Monday, October 8.  Will need to wait until that time to make any medication changes.  To switch metoprolol succinate from morning to evening to see if he feels better.  On Monday, October 8 to take metoprolol succinate 200 mg in the morning and at night additional 100 mg and then the next night to go to 250 mg of metoprolol succinate Q evening.  Instructions were written out on AVS for medication nurse and stressed that they are to give this to the med nurse.  Will do remote device check in 1 month to reassess heart rates.  May need to further increase metoprolol to succinate at that time and discussed today.   Dr. Rivera had documentation that he may consider restarting amiodarone despite historyof long QT on it, but since he is asymptomatic I am reluctant to do that.  I have chosen to increased beta-blocker instead of using calcium channel blocker as last EF 30-35%.    Ventricular tachycardia (H) and had been on mexiletine in the past for VT.  He has had a true cardiac arrest.  He is also  at risk for inappropriate ICD shocks if I cannot decrease his heart rate further.    ICD (implantable cardioverter-defibrillator) in place and device functioning appropriately.  Dr. Rivera is previously made some changes to ventricular arrhythmia settings.    Dilated cardiomyopathy (H) and last EF 30-35%    Chronic systolic heart failure (H) and will switch to chronic systolic heart failure is no signs or symptoms of decompensated heart failure today on close questioning.  [place] heart association class II and has shortness of breath only when he walks quite fast.    CAD (coronary artery disease) and no angina or anginal equivalent symptoms on questioning.  -     metoprolol succinate (TOPROL-XL) 100 MG 24 hr tablet; Take 2.5 tablets (250 mg total) by mouth every evening.  Dispense: 225 tablet; Refill: 4    [id number] score of  3 and on renal dose Xarelto and taking with evening meal verified.  Follow up in clinic with device check and see Dr. Rivera in 2-3 months.  If he does not have good rate control of A. fib at that visit would need to consider AV node ablation and upgrade of device to CRT D.  This was not discussed at the visit due to language barrier and felt that it would be overwhelming for patient and do not know if this would be needed.  There was a [employer]  present and used throughout the whole visit.     History of Present Illness    Mr. Dillon Pennington is a very pleasant 63 y.o. male who comes in today for urgent EP appointment due to increasing A. fib burden noted on  device alert.  Dillon Pennington has a known history of cardiomyopathy, cardiac arrest, systolic heart failure, VT and paroxysmal atrial fibrillation.  He was on mexiletine for VT and is previously been on amiodarone for atrial fibrillation but was taken off of it do prone to prolonged QT.  He is accompanied by family today and [employer] .  On close questioning he has not noticed a difference in how he feels from day to day  and his episodes of A. fib are long.  He has mild lightheadedness in the morning when he first gets up but clears quickly.  He is able to be active and does not notice restrictions in physical activity.  His blood pressure at home is generally 120s-130s over 70s-80s.  Otherwise he denies any other cardiac symptoms.  He is pleasant.  He reports that he feels well overall and not having other health issues.    Cardiographics (personally reviewed):  Results for orders placed during the hospital encounter of 07/11/18   Echo Complete [ECH10] 07/11/2018    Narrative   Left ventricular systolic function is moderately decreased with an LVEF   of 30-35%    Normal right ventricular size and systolic function.    ICD lead is present in the ventricle.    Mild to moderate mitral regurgitation.    When compared to the previous study dated 4/20/2018, the left   ventricular systolic function has increased from 20 to 30-35% and the   degre of mitral regurgitation has decreased.          Results for orders placed during the hospital encounter of 04/12/18   NM Pharmacologic Stress Test    Narrative   The pharmacologic nuclear stress test is abnormal.    The left ventricular ejection fraction is 15% with global hypokinesis.    There is a small area of a moderate degree of nontransmural infarction   in the apical segment(s) of the left ventricle.    There is no prior study available.        Device check shows leads stable and battery ok.  Device functioning appropriately.   Atrial pacing 25% and Ventricular pacing6%.   A. fib burden 30% and previously had been 20%.  Ventricular response in atrial arrhythmias greater than or equal to 120 bpm 10-15% of the time and occasionally greater than 200 bpm.  18 episodes of what was labeled as  nonsustained VT but EGM show A. fib with RVR.  No true ventricular arrhythmias.    Results for orders placed or performed in visit on 08/17/18   ECG Clinic - Today   Result Value Ref Range    SYSTOLIC BLOOD  PRESSURE  mmHg    DIASTOLIC BLOOD PRESSURE  mmHg    VENTRICULAR RATE 70 BPM    ATRIAL RATE 70 BPM    P-R INTERVAL 122 ms    QRS DURATION 94 ms    Q-T INTERVAL 436 ms    QTC CALCULATION (BEZET) 470 ms    P Axis -19 degrees    R AXIS -16 degrees    T AXIS 5 degrees    MUSE DIAGNOSIS       Electronic atrial pacemaker  Moderate voltage criteria for LVH, may be normal variant  Nonspecific T wave abnormality  Prolonged QT  Abnormal ECG  When compared with ECG of 16-MAY-2018 21:19,  No significant change was found  Confirmed by SOHA KAPOOR, HAILE LOC:TENA ([id number]) on 8/20/2018 2:34:51 PM            Problem List:  Patient Active Problem List   Diagnosis   ? Chronic kidney disease (CKD), stage III (moderate) (H)   ? Acute on chronic systolic heart failure (H)   ? Dilated cardiomyopathy (H)   ? Acute renal failure superimposed on stage 3 chronic kidney disease, unspecified acute renal failure type (H)   ? Persistent atrial fibrillation (H)   ? VF (ventricular fibrillation) (H)   ? Coronary artery disease involving native coronary artery of native heart without angina pectoris   ? Ventricular tachycardia (H)   ? Anemia   ? Dyslipidemia, goal LDL below 70   ? ICD (implantable cardioverter-defibrillator) in place, dual chamber       Physical Examination Review of Systems   Vitals:    10/04/18 0750   BP: 130/82   Pulse: 60   Resp: 18     Body mass index is 27.81 kg/(m^2).  Wt Readings from Last 3 Encounters:   10/04/18 162 lb (73.5 kg)   08/17/18 166 lb (75.3 kg)   07/11/18 152 lb (68.9 kg)     General Appearance:   Alert, well-appearing and in no acute distress.   HEENT: Atraumatic, normocephalic.  No scleral icterus, normal conjunctivae; mucous membranes pink and moist.     Chest: Chest symmetric, spine straight.   Lungs:   Respirations unlabored: Lungs are clear to auscultation except fine crackles in left lower lobe.   Cardiovascular:   Normal first and second heart sounds with no murmurs, rubs, or gallops.  Regular, regular.   Radial and posterior tibial pulses are intact.  Normal JVD, no edema.       Extremities: No cyanosis or clubbing   Musculoskeletal: Moves all extremities   Skin: Warm, dry, intact.    Neurologic: Mood and affect are appropriate, alert and oriented to person, place, time, and situation    General: WNL  Eyes: WNL  Ears/Nose/Throat: WNL  Lungs: WNL  Heart: WNL  Stomach: WNL  Bladder: WNL  Muscle/Joints: WNL  Skin: WNL  Nervous System: WNL  Mental Health: WNL     Blood: WNL       Medical History  Surgical History Family History Social History   Past Medical History:   Diagnosis Date   ? DMITRI (acute kidney injury) (H)    ? CKD (chronic kidney disease)    ? Dyslipidemia, goal LDL below 70 4/16/2018   ? Mushroom poisoning 9/11/2006   ? Nonischemic cardiomyopathy (H)    ? Paroxysmal atrial fibrillation (H)    ? Persistent atrial fibrillation (H) 4/12/2018    Past Surgical History:   Procedure Laterality Date   ? CHOLECYSTECTOMY     ? CV CORONARY ANGIOGRAM N/A 4/18/2018    Procedure: Coronary Angiogram;  Surgeon: Heriberto Cheung MD;  Location: [facility] Cath Lab;  Service:    ? CV CORONARY ANGIOGRAM N/A 4/20/2018    Procedure: Coronary Angiogram;  Surgeon: Javon Carrington MD;  Location: [facility] Cath Lab;  Service:    ? CV IMPELLA INSERT N/A 4/20/2018    Procedure: Impella Insert;  Surgeon: Javon Carrington MD;  Location: [facility] Cath Lab;  Service:    ? CV LEFT HEART CATHETERIZATION WO LEFT VETRICULOGRAM Left 4/18/2018    Procedure: Left Heart Catheterization Without Left Ventriculogram;  Surgeon: Heriberto Cheung MD;  Location: [facility] Cath Lab;  Service:    ? EP ICD INSERT N/A 4/23/2018    Procedure: EP ICD Insert;  Surgeon: Rakesh Rivera MD;  Location: [facility] Cath Lab;  Service:    ? PICC  4/18/2018         No family history on file. Social History     Social History   ? Marital status:      Spouse name: Soto Rivear   ? Number of children: N/A   ? Years of education: N/A      Occupational History   ? Not on file.     Social History Main Topics   ? Smoking status: Never Smoker   ? Smokeless tobacco: Never Used   ? Alcohol use Yes      Comment: very rare    ? Drug use: No   ? Sexual activity: Not on file     Other Topics Concern   ? Not on file     Social History Narrative    The patient's son was present to provide the HPI.          Medications  Allergies   Current Outpatient Prescriptions   Medication Sig Dispense Refill   ? acetaminophen (TYLENOL EXTRA STRENGTH) 500 MG tablet Take 500 mg by mouth every 6 (six) hours as needed for pain. Indications: Pain     ? allopurinol (ZYLOPRIM) 100 MG tablet Take 50 mg by mouth daily. Indications: prevention of acute gout attack     ? atorvastatin (LIPITOR) 80 MG tablet Take 1 tablet (80 mg total) by mouth daily. 90 tablet 3   ? clopidogrel (PLAVIX) 75 mg tablet Take 1 tablet (75 mg total) by mouth daily. 90 tablet 3   ? furosemide (LASIX) 20 MG tablet Take 1 tablet (20 mg total) by mouth daily. 90 tablet 3   ? hydrALAZINE (APRESOLINE) 50 MG tablet Take 1 tablet (50 mg total) by mouth 3 (three) times a day. 270 tablet 3   ? isosorbide mononitrate (IMDUR) 30 MG 24 hr tablet Take 1 tablet (30 mg total) by mouth daily. 90 tablet 3   ? levothyroxine (SYNTHROID) 88 MCG tablet Take 88 mcg by mouth Daily at 6:00 am. Indications: hypothyroidism     ? metoprolol succinate (TOPROL-XL) 100 MG 24 hr tablet Take 2.5 tablets (250 mg total) by mouth every evening. 225 tablet 4   ? nitroglycerin (NITROSTAT) 0.4 MG SL tablet Place 1 tablet (0.4 mg total) under the tongue every 5 (five) minutes as needed for chest pain. 1 Bottle 3   ? oxyCODONE-acetaminophen (PERCOCET) 5-325 mg per tablet Take 1 tablet by mouth every 4 (four) hours as needed for pain. 12 tablet 0   ? rivaroxaban 15 mg Tab Take 1 tablet (15 mg total) by mouth daily with supper. 30 tablet 3     No current facility-administered medications for this visit.       No Known Allergies   Medical,  surgical, family, social history, and medications were all reviewed and updated as necessary.   Lab Results    Chemistry CBC/INR CHOLESTROL   Lab Results   Component Value Date    CREATININE 1.78 (H) 08/17/2018    BUN 32 (H) 08/17/2018     08/17/2018    K 4.4 08/17/2018     (H) 08/17/2018    CO2 25 08/17/2018     Creatinine (mg/dL)   Date Value   08/17/2018 1.78 (H)   05/22/2018 2.09 (H)   05/16/2018 2.02 (H)   04/25/2018 1.71 (H)     Lab Results   Component Value Date    BNP 81 (H) 05/16/2018    Lab Results   Component Value Date    WBC 8.6 05/16/2018    HGB 11.5 (L) 05/16/2018    HCT 34.3 (L) 05/16/2018    MCV 90 05/16/2018     05/16/2018     Lab Results   Component Value Date    INR 1.19 (H) 04/29/2018      Lab Results   Component Value Date    CHOL 156 08/17/2018    HDL 43 08/17/2018    LDLCALC 73 08/17/2018    TRIG 201 (H) 08/17/2018          Total Time- 45 minutes with greater than 50% spent talking to patient and family regarding patient's relevant diagnoses.  This note has been dictated using voice recognition software. Any grammatical, typographical, or context distortions are unintentional and inherent to the software.    Rachael Rubalcava RN,  [facility]   Electrophysiology  [phone]

## 2021-06-26 NOTE — PROGRESS NOTES
Progress Notes by Rama Zhu RN at 6/18/2018 10:58 AM     Author: Rama Zhu RN Service: -- Author Type: Registered Nurse    Filed: 6/19/2018  2:44 PM Encounter Date: 6/18/2018 Status: Signed    : Rama Zhu RN (Registered Nurse)       MD Rama Caputo RN                     Either would be fine as soon as able  t ymdg            Previous Messages       ----- Message -----      From: Rama Zhu RN      Sent: 6/18/2018  11:16 AM        To: Parth Russo MD     Hi Dr. Russo,     This patient's AF burden is slowly creeping up per device report, also V-rates are a bit on faster side. I checked with schedulers to see if I could bump up appt with you but nothing is open. Should I get him in with AF clinic? Or RAC? -not terribly symptomatic.   Rama          Above noted. Will route to device schedulers to arrange follow up, next available as recommended by Dr. Russo.     Rama Zhu RN

## 2021-06-26 NOTE — PROGRESS NOTES
Progress Notes by Marion Blunt CNP at 7/10/2018  2:50 PM     Author: Marion Blunt CNP Service: -- Author Type: Nurse Practitioner    Filed: 7/10/2018  3:37 PM Encounter Date: 7/10/2018 Status: Signed    : Marion Blunt CNP (Nurse Practitioner)           Click to link to [facility] Heart [facility] HEART [facility] NOTE      Assessment/Recommendations   Assessment:    1. Ischemic cardiomyopathy with systolic dysfunction, NYHA class II: Compensated.  No signs of fluid retention.  His weights have remained stable at home.  He continues to follow a low-sodium diet. He is scheduled for echocardiogram July 11.    2.  Persistent atrial fibrillation: Rate controlled.  He continues Xarelto for anticoagulation and metoprolol for rate control.    Plan:  1.   Heart failure medications:  - Beta blocker therapy with metroprolol succinate 200 mg daily  - NO ACEI/ARB therapy due to CKD  - Diuretic therapy with furosemide 20 mg daily  - Hydralazine 50 mg twice a day and imdur 30 mg daily  2. Continue current medications  3. Continue daily weights and low sodium diet  4.  Encouraged regular exercise  5.  Echocardiogram July 11    Dillon Pennington will follow up with Dr. Russo on August 17 and in the heart failure clinic in October.     History of Present Illness    Mr. Dillon Pennington is a 63 y.o. male seen at [facility] heart failure clinic today for continued follow-up.  His wife accompanies him today.  There is an  for the duration of the appointment.  His most recent echocardiogram was done February 16, 2018 which showed an ejection fraction of 20-25% along with moderate to severe mitral regurgitation and moderate tricuspid regurgitation.  He has a past medical history significant for coronary artery disease, hyperlipidemia, persistent atrial fibrillation, and chronic kidney disease.  He has an ICD that was placed April 23, 2018.    Today, he continues to have fatigue.  He states  his dyspnea on exertion and dizziness have improved.  He denies orthopnea or PND.  He denies chest pain.  He denies lightheadedness, shortness of breath, orthopnea, PND, palpitations, chest pain, abdominal fullness/bloating and lower extremity edema.      He is monitoring home weights which are stable between 155-157 pounds.  He is following a low sodium diet.         Physical Examination Review of Systems   Vitals:    07/10/18 1506   BP: 116/72   Pulse: 72   Resp: 16     Body mass index is 26.09 kg/(m^2).  Wt Readings from Last 3 Encounters:   07/10/18 152 lb (68.9 kg)   06/21/18 153 lb 12.8 oz (69.8 kg)   05/31/18 148 lb (67.1 kg)       General Appearance:     Alert, cooperative and in no acute distress.   ENT/Mouth: membranes moist, no oral lesions or bleeding gums.      EYES:  no scleral icterus, normal conjunctivae   Neck: no carotid bruits or thyromegaly   Chest/Lungs:   lungs are clear to auscultation, no rales or wheezing, respirations unlabored   Cardiovascular:    Irregularly irregular. Normal first and second heart sounds with no murmurs, rubs, or gallops; the carotid, radial and posterior tibial pulses are intact, Jugular venous pressure normal, no edema     Abdomen:  Soft, nontender, nondistended, bowel sounds present   Extremities: no cyanosis or clubbing   Skin: warm, dry.    Neurologic: mood and affect are appropriate, alert and oriented x3      General: WNL  Eyes: Visual Distubance  Ears/Nose/Throat: WNL  Lungs: WNL  Heart: Shortness of Breath with activity  Stomach: WNL  Bladder: Frequent Urination at Night  Muscle/Joints: WNL  Skin: WNL  Nervous System: Daytime Sleepiness  Mental Health: WNL     Blood: WNL     Medical History  Surgical History Family History Social History   Past Medical History:   Diagnosis Date   ? DMITRI (acute kidney injury) (H)    ? CKD (chronic kidney disease)    ? Dyslipidemia, goal LDL below 70 4/16/2018   ? Mushroom poisoning 9/11/2006   ? Nonischemic cardiomyopathy (H)    ?  Paroxysmal atrial fibrillation (H)    ? Persistent atrial fibrillation (H) 4/12/2018    Past Surgical History:   Procedure Laterality Date   ? CHOLECYSTECTOMY     ? CV CORONARY ANGIOGRAM N/A 4/18/2018    Procedure: Coronary Angiogram;  Surgeon: Heriberto Cheung MD;  Location: [facility] Cath Lab;  Service:    ? CV CORONARY ANGIOGRAM N/A 4/20/2018    Procedure: Coronary Angiogram;  Surgeon: Javon Carrington MD;  Location: [facility] Cath Lab;  Service:    ? CV IMPELLA INSERT N/A 4/20/2018    Procedure: Impella Insert;  Surgeon: Javon Carrington MD;  Location: [facility] Cath Lab;  Service:    ? CV LEFT HEART CATHETERIZATION WO LEFT VETRICULOGRAM Left 4/18/2018    Procedure: Left Heart Catheterization Without Left Ventriculogram;  Surgeon: Heriberto Cheung MD;  Location: [facility] Cath Lab;  Service:    ? EP ICD INSERT N/A 4/23/2018    Procedure: EP ICD Insert;  Surgeon: Rakesh Rivera MD;  Location: [facility] Cath Lab;  Service:    ? PICC  4/18/2018         History reviewed. No pertinent family history. Social History     Social History   ? Marital status:      Spouse name: Soto Rivera   ? Number of children: N/A   ? Years of education: N/A     Occupational History   ? Not on file.     Social History Main Topics   ? Smoking status: Never Smoker   ? Smokeless tobacco: Never Used   ? Alcohol use Yes      Comment: very rare    ? Drug use: No   ? Sexual activity: Not on file     Other Topics Concern   ? Not on file     Social History Narrative    The patient's son was present to provide the HPI.          Medications  Allergies   Current Outpatient Prescriptions   Medication Sig Dispense Refill   ? acetaminophen-codeine (TYLENOL #3) 300-30 mg per tablet Take 1 tablet by mouth daily as needed.  0   ? allopurinol (ZYLOPRIM) 100 MG tablet Take 50 mg by mouth daily. Indications: prevention of acute gout attack     ? atorvastatin (LIPITOR) 80 MG tablet Take 1 tablet (80 mg total) by mouth daily. 30  tablet 3   ? furosemide (LASIX) 20 MG tablet Take 1 tablet (20 mg total) by mouth daily. 90 tablet 3   ? hydrALAZINE (APRESOLINE) 50 MG tablet Take 1 tablet (50 mg total) by mouth 3 (three) times a day. 90 tablet 3   ? isosorbide mononitrate (IMDUR) 30 MG 24 hr tablet Take 1 tablet (30 mg total) by mouth daily. 30 tablet 3   ? levothyroxine (SYNTHROID, LEVOTHROID) 100 MCG tablet Take 100 mcg by mouth daily.     ? metoprolol succinate (TOPROL-XL) 100 MG 24 hr tablet Take 200 mg by mouth daily.     ? oxyCODONE-acetaminophen (PERCOCET) 5-325 mg per tablet Take 1 tablet by mouth every 4 (four) hours as needed for pain. 12 tablet 0   ? rivaroxaban 15 mg Tab Take 1 tablet (15 mg total) by mouth daily with supper. 30 tablet 3   ? clopidogrel (PLAVIX) 75 mg tablet Take 1 tablet (75 mg total) by mouth daily. 90 tablet 3   ? mexiletine (MEXITIL) 150 MG capsule Take 1 capsule (150 mg total) by mouth every 8 (eight) hours. 30 capsule 0   ? nitroglycerin (NITROSTAT) 0.4 MG SL tablet Place 0.4 mg under the tongue every 5 (five) minutes as needed for chest pain.     ? predniSONE (DELTASONE) 10 mg tablet Take 2 tablets (20 mg total) by mouth daily. 15 tablet 0     No current facility-administered medications for this visit.       No Known Allergies      Lab Results    Chemistry CBC BNP   Lab Results   Component Value Date    CREATININE 2.09 (H) 05/22/2018    BUN 41 (H) 05/22/2018     05/22/2018    K 4.0 05/22/2018     05/22/2018    CO2 25 05/22/2018     Creatinine (mg/dL)   Date Value   05/22/2018 2.09 (H)   05/16/2018 2.02 (H)   04/25/2018 1.71 (H)   04/24/2018 1.54 (H)    Lab Results   Component Value Date    WBC 8.6 05/16/2018    HGB 11.5 (L) 05/16/2018    HCT 34.3 (L) 05/16/2018    MCV 90 05/16/2018     05/16/2018    Lab Results   Component Value Date    BNP 81 (H) 05/16/2018     BNP (pg/mL)   Date Value   05/16/2018 81 (H)   04/17/2018 335 (H)   04/12/2018 956 (H)          25 minutes were spent with the  patient with greater than 50% spent on education and counseling.      Marion Blunt, [facility]   Heart Failure Clinic

## 2021-06-26 NOTE — PROGRESS NOTES
Progress Notes by Rama Zhu RN at 10/31/2018  9:56 AM     Author: Rama Zhu RN Service: -- Author Type: Registered Nurse    Filed: 11/1/2018 11:46 AM Encounter Date: 10/31/2018 Status: Signed    : Rama Zhu RN (Registered Nurse)       Type: Routine remote ICD transmission per Kelechi H. CNP to assess heart rates post medication adjustment.   Presenting: AP-VS, rate 70s.   Lead/Battery Status: Stable.   Atrial Arrhythmias: Since 10/15/18, 388 mode switch episodes, longest lasting ~60 hours, burden 35%, ventricular rates >/=120bpm ~30-35%, review of available EGMs appear to be atrial tachy-arrhythmias, intermittent atrial undersensing noted.   Vent Arrhythmias: Since 10/15/18, 66 untreated episodes detected, review of EGMs appear to be some RVR and some RVR vs VT/VF, and 33 labeled NSVT, review of available EGMs appear to be RVR.   Anticoagulant: Xarelto.   Comments: Normal ICD function. Results routed to device RN for review and Kelechi H. CNP.   Alerts: None. KLP    Transmission reviewed, agree with above. Known PAF with tendency toward RVR. Current histograms show no significant improvements since increase of Metoprolol.  V-rates still often reaching VT zone with rates 180-200s, therapy appropriately withheld by AF discriminators. Numerous episodes of RVR into VT zone noted yesterday 10/30/18.    Episode V-118 shows RVR with rates 190-200s, and also a 10 beat run of NSVT (VT rates reaching VF zone).      See snapshots below. Full Device Results Scanned in/attached to Device Remote Report.                                  Episode V-118 (AF w/ RVR, NSVT)          Rhythm ID withholding Therapies        Will update Kelechi H. CNP  Rama Zhu, AMBER

## 2021-06-26 NOTE — PROGRESS NOTES
Progress Notes by Irlanda Edwards NP at 11/21/2018  3:30 PM     Author: Irlanda Edwards NP Service: -- Author Type: Nurse Practitioner    Filed: 11/21/2018  4:59 PM Encounter Date: 11/21/2018 Status: Signed    : Irlanda Edwards NP (Nurse Practitioner)           Click to link to [facility] Heart Care     [facility] HEART CARE NOTE      Assessment/Recommendations   Assessment:    1. Ischemic cardiomyopathy with systolic dysfunction, NYHA class II with EF of 30-35%: Well compensated.  He has no signs and symptoms of heart failure.  He reports being compliant with taking all his heart failure medications.  He reports following low-sodium diet and staying active.      His blood pressure today is 100/78.  He is asymptomatic.    2.  Persistent atrial fibrillation: Heart rate today in 80s.  He is asymptomatic.  Recent device check showed A. fib with RVR.  His metoprolol dose was increased from 250 mg to 300 mg daily.  Patient has been tolerating it well without any adverse effect.  He is on Xarelto for anticoagulation.  He denies any bleeding complication. Kelechi has recommended to consider AV node ablation and upgrade to CRT-D if he does not respond to the higher dose of metoprolol.    3. Chronic kidney disease stage III: Most recent potassium 4.4, BUN 32, and creatinine 1.78-stable.    Plan:  1.  No further medication changes made today given borderline blood pressure.  Heart failure medications:  - Beta blocker therapy with metroprolol succinate 300 mg daily  -  Diuretic therapy with furosemide furosemide 20 mg daily.  - Hydralazine 50 mg 3 times a day  - Isosorbide mononitrate 30 mg daily    2.  Continue current regimen for atrial fibrillation. Patient was encouraged to call if having symptoms of shortness of breath, heart palpitations, heart racing, fatigue, lightheadedness or dizziness.    3.  Reinforced low-sodium diet, daily weight, and stay active as tolerated.    Follow up with Dr. Rivera with device check in  December as scheduled, Marion Blunt CNP in 8 weeks and Dr. Russo in 12 weeks.     History of Present Illness    Mr. Dillon Pennington is a 63 y.o. male with a significant past medical history of ischemic cardiomyopathy with systolic dysfunction, ventricular tachycardia, ventricular fibrillation, status post ICD, coronary artery disease with status post PCI to diagonal/mid LAD/mid circumflex, and chronic kidney disease who is seen at [facility] heart failure clinic today for follow-up per Dr. Russo.  Patient recently saw Dr. Russo in October 2018.  He recently had a device check showed persistent atrial fibrillation with rapid ventricular rate.  Therefore, he was recommended to increase his metoprolol dose from 250 mg to 300 mg daily.  He was recommended to avoid taking aspirin and Motrin given he is on Plavix and Xarelto to prevent the bleeding risk.  His recent echocardiogram showed improvement and ejection fraction from 20% to 35%.  His renal function has also stabilized.    Today, patient presented to the heart care clinic accompanied by a [facility] .  He reports that he has been tolerating the increased dose of metoprolol without any adverse effect. He denies fatigue, lightheadedness, shortness of breath, orthopnea, PND, palpitations, chest pain, abdominal fullness/bloating and lower extremity edema.  He reports occasional mild shortness of breath when he takes the stairs.  He reports his weight stable around 160-162 pounds.  He is following heart healthy and low-sodium diet at home.  He walks about 1 mile every day.      Physical Examination Review of Systems   Vitals:    11/21/18 1539   BP: 100/78   Pulse: 80   Resp: 18     Body mass index is 27.81 kg/m .  Wt Readings from Last 3 Encounters:   11/21/18 162 lb (73.5 kg)   11/19/18 159 lb (72.1 kg)   10/18/18 159 lb (72.1 kg)       General Appearance:     Alert, cooperative and in no acute distress.   ENT/Mouth: membranes moist, no oral lesions  or bleeding gums.      EYES:  no scleral icterus, normal conjunctivae   Neck: no carotid bruits or thyromegaly   Chest/Lungs:   lungs are clear to auscultation, no rales or wheezing, respirations unlabored   Cardiovascular:   Normal first and second heart sounds with no murmurs, rubs, or gallops; the carotid, radial and posterior tibial pulses are intact, Jugular venous pressure flat with no HJR, no edema bilateral lower extremities    Abdomen:  Large but soft, nontender, nondistended, bowel sounds present   Extremities: no cyanosis or clubbing   Skin: warm, dry.    Neurologic: mood and affect are appropriate, alert and oriented x3      General: WNL  Eyes: WNL  Ears/Nose/Throat: WNL  Lungs: WNL  Heart: WNL  Stomach: WNL  Bladder: Frequent Urination at Night  Muscle/Joints: Joint Pain  Skin: WNL  Nervous System: WNL  Mental Health: Anxiety     Blood: WNL     Medical History  Surgical History Family History Social History   Past Medical History:   Diagnosis Date   ? DMITRI (acute kidney injury) (H)    ? CKD (chronic kidney disease)    ? Dyslipidemia, goal LDL below 70 4/16/2018   ? Mushroom poisoning 9/11/2006   ? Nonischemic cardiomyopathy (H)    ? Paroxysmal atrial fibrillation (H)    ? Persistent atrial fibrillation (H) 4/12/2018    Past Surgical History:   Procedure Laterality Date   ? CHOLECYSTECTOMY     ? CV CORONARY ANGIOGRAM N/A 4/18/2018    Procedure: Coronary Angiogram;  Surgeon: Heriberto Cheung MD;  Location: [facility] Cath Lab;  Service:    ? CV CORONARY ANGIOGRAM N/A 4/20/2018    Procedure: Coronary Angiogram;  Surgeon: Javon Carrington MD;  Location: [facility] Cath Lab;  Service:    ? CV IMPELLA INSERT N/A 4/20/2018    Procedure: Impella Insert;  Surgeon: Javon Carrington MD;  Location: [facility] Cath Lab;  Service:    ? CV LEFT HEART CATHETERIZATION WO LEFT VETRICULOGRAM Left 4/18/2018    Procedure: Left Heart Catheterization Without Left Ventriculogram;  Surgeon: Heriberto Cheung MD;   Location: [facility] Lab;  Service:    ? EP ICD INSERT N/A 4/23/2018    Procedure: EP ICD Insert;  Surgeon: Rakesh Rivera MD;  Location: [facility];  Service:    ? PICC  4/18/2018         No family history on file. Social History     Socioeconomic History   ? Marital status:      Spouse name: Soto Rivera   ? Number of children: Not on file   ? Years of education: Not on file   ? Highest education level: Not on file   Social Needs   ? Financial resource strain: Not on file   ? Food insecurity - worry: Not on file   ? Food insecurity - inability: Not on file   ? Transportation needs - medical: Not on file   ? Transportation needs - non-medical: Not on file   Occupational History   ? Not on file   Tobacco Use   ? Smoking status: Never Smoker   ? Smokeless tobacco: Never Used   Substance and Sexual Activity   ? Alcohol use: Yes     Comment: very rare    ? Drug use: No   ? Sexual activity: Not on file   Other Topics Concern   ? Not on file   Social History Narrative    The patient's son was present to provide the HPI.          Medications  Allergies   Current Outpatient Medications   Medication Sig Dispense Refill   ? allopurinol (ZYLOPRIM) 100 MG tablet Take 50 mg by mouth daily. Indications: prevention of acute gout attack     ? atorvastatin (LIPITOR) 80 MG tablet Take 1 tablet (80 mg total) by mouth daily. 90 tablet 3   ? clopidogrel (PLAVIX) 75 mg tablet Take 1 tablet (75 mg total) by mouth daily. 90 tablet 3   ? furosemide (LASIX) 20 MG tablet Take 1 tablet (20 mg total) by mouth daily. 90 tablet 3   ? hydrALAZINE (APRESOLINE) 50 MG tablet Take 1 tablet (50 mg total) by mouth 3 (three) times a day. 270 tablet 3   ? isosorbide mononitrate (IMDUR) 30 MG 24 hr tablet Take 1 tablet (30 mg total) by mouth daily. 90 tablet 3   ? levothyroxine (SYNTHROID) 88 MCG tablet Take 88 mcg by mouth Daily at 6:00 am. Indications: hypothyroidism     ? metoprolol succinate (TOPROL-XL) 100 MG 24 hr tablet Take  3 tablets (300 mg total) by mouth every evening. 270 tablet 3   ? predniSONE (DELTASONE) 20 MG tablet Take 20 mg by mouth daily as needed. Per ALEJANDRA Woodruff PA-C Take 20mg daily for gout     ? rivaroxaban 15 mg Tab Take 1 tablet (15 mg total) by mouth daily with supper. 30 tablet 3   ? acetaminophen (TYLENOL EXTRA STRENGTH) 500 MG tablet Take 500 mg by mouth every 6 (six) hours as needed for pain. Indications: Pain     ? nitroglycerin (NITROSTAT) 0.4 MG SL tablet Place 1 tablet (0.4 mg total) under the tongue every 5 (five) minutes as needed for chest pain. 1 Bottle 3     No current facility-administered medications for this visit.       No Known Allergies      Lab Results    Chemistry CBC BNP   Lab Results   Component Value Date    CREATININE 1.78 (H) 08/17/2018    BUN 32 (H) 08/17/2018     08/17/2018    K 4.4 08/17/2018     (H) 08/17/2018    CO2 25 08/17/2018     Creatinine (mg/dL)   Date Value   08/17/2018 1.78 (H)   05/22/2018 2.09 (H)   05/16/2018 2.02 (H)   04/25/2018 1.71 (H)    Lab Results   Component Value Date    WBC 8.6 05/16/2018    HGB 11.5 (L) 05/16/2018    HCT 34.3 (L) 05/16/2018    MCV 90 05/16/2018     05/16/2018    Lab Results   Component Value Date    BNP 81 (H) 05/16/2018     BNP (pg/mL)   Date Value   05/16/2018 81 (H)   04/17/2018 335 (H)   04/12/2018 956 (H)          Irlanda Edwards CNP  [facility]   Heart Failure Clinic           This note has been dictated using voice recognition software. Any grammatical, typographical, or context distortions are unintentional and inherent to the software

## 2021-06-27 NOTE — PROGRESS NOTES
Progress Notes by Marion Blunt CNP at 1/16/2019  3:30 PM     Author: Marion Blunt CNP Service: -- Author Type: Nurse Practitioner    Filed: 1/16/2019  4:10 PM Encounter Date: 1/16/2019 Status: Signed    : Marion Blunt CNP (Nurse Practitioner)           Click to link to [facility] HEART CARE NOTE      Assessment/Recommendations   Assessment:    1. Ischemic cardiomyopathy with systolic dysfunction, NYHA class I: Compensated.  No signs or symptoms of fluid retention.  He states his weights have been stable.  He is following a low-sodium diet.    2.  Persistent atrial fibrillation: Rate controlled.  He continues metoprolol for rate control and Xarelto for anticoagulation.    3.  Chronic kidney disease: He follows with [facility] [place] on a regular basis.  He states he was there in December and had lab work done.  He is supposed to follow-up with them in February again.  I have requested lab work results from their office.    Plan:  1.   Heart failure medications:  - Beta blocker therapy with metroprolol succinate 200 mg daily  -  No ACEI/ARB therapy due to CKD  - Diuretic therapy with furosemide 20 mg daily  - Isosorbide mononitrate 30 mg daily and hydralazine 50 mg 3 times a day  2. Requested lab results from [facility] [place]  3.  Continue daily weights and low-sodium diet    Dillon Pennington will follow up with Dr. Russo in February and in the heart failure clinic in 3 months.     History of Present Illness    Mr. Dillon Pennington is a 63 y.o. male seen at [facility] heart failure clinic today for continued follow-up.  His wife accompanies him today.  There is an  for the duration of the appointment.  He follows up for ischemic cardiomyopathy with systolic dysfunction.  His most recent echocardiogram was done July 2018 which showed an ejection fraction of 30-35%.  He is a past medical history significant for coronary  artery disease, hyperlipidemia, persistent atrial fibrillation, and chronic kidney disease.  He had an ICD placed in April 2018.    Today, he comes in with no complaints of acute heart failure.  He denies dyspnea on exertion, orthopnea, PND, abdominal bloating or lower extremity edema.  He denies fatigue, lightheadedness, shortness of breath, dyspnea on exertion, orthopnea, PND, palpitations, chest pain, abdominal fullness/bloating and lower extremity edema.      He is monitoring home weights which are stable around 165 pounds.  He is following a low sodium diet.        Physical Examination Review of Systems   Vitals:    01/16/19 1542   BP: 124/82   Pulse: 80   Resp: 16     Body mass index is 30.18 kg/m .  Wt Readings from Last 3 Encounters:   01/16/19 165 lb (74.8 kg)   12/20/18 163 lb (73.9 kg)   12/06/18 162 lb (73.5 kg)       General Appearance:     Alert, cooperative and in no acute distress.   ENT/Mouth: membranes moist, no oral lesions or bleeding gums.      EYES:  no scleral icterus, normal conjunctivae   Neck: no carotid bruits or thyromegaly   Chest/Lungs:   lungs are clear to auscultation, no rales or wheezing, respirations unlabored   Cardiovascular:    Irregularly irregular. Normal first and second heart sounds with no murmurs, rubs, or gallops; the carotid, radial and posterior tibial pulses are intact, Jugular venous pressure normal, no edema     Abdomen:  Soft, nontender, nondistended, bowel sounds present   Extremities: no cyanosis or clubbing   Skin: warm, dry.    Neurologic: mood and affect are appropriate, alert and oriented x3      General: WNL  Eyes: WNL  Ears/Nose/Throat: WNL  Lungs: WNL  Heart: WNL  Stomach: WNL  Bladder: Frequent Urination at Night  Muscle/Joints: WNL  Skin: WNL  Nervous System: WNL  Mental Health: WNL     Blood: WNL     Medical History  Surgical History Family History Social History   Past Medical History:   Diagnosis Date   ? DMITRI (acute kidney injury) (H)    ? CKD (chronic  kidney disease)    ? Dyslipidemia, goal LDL below 70 4/16/2018   ? Mushroom poisoning 9/11/2006   ? Nonischemic cardiomyopathy (H)    ? Paroxysmal atrial fibrillation (H)    ? Persistent atrial fibrillation (H) 4/12/2018    Past Surgical History:   Procedure Laterality Date   ? CHOLECYSTECTOMY     ? CV CORONARY ANGIOGRAM N/A 4/18/2018    Procedure: Coronary Angiogram;  Surgeon: Heriberto Cheung MD;  Location: [facility] Lab;  Service:    ? CV CORONARY ANGIOGRAM N/A 4/20/2018    Procedure: Coronary Angiogram;  Surgeon: Javon Carrington MD;  Location: [facility] Cath Lab;  Service:    ? CV IMPELLA INSERT N/A 4/20/2018    Procedure: Impella Insert;  Surgeon: Javon Carrington MD;  Location: [facility] Lab;  Service:    ? CV LEFT HEART CATHETERIZATION WO LEFT VETRICULOGRAM Left 4/18/2018    Procedure: Left Heart Catheterization Without Left Ventriculogram;  Surgeon: Heriberto Cheung MD;  Location: [facility] Cath Lab;  Service:    ? EP ICD INSERT N/A 4/23/2018    Procedure: EP ICD Insert;  Surgeon: Rakesh Rivera MD;  Location: [facility] Lab;  Service:    ? PICC  4/18/2018         History reviewed. No pertinent family history. Social History     Socioeconomic History   ? Marital status:      Spouse name: Soto Rivera   ? Number of children: Not on file   ? Years of education: Not on file   ? Highest education level: Not on file   Social Needs   ? Financial resource strain: Not on file   ? Food insecurity - worry: Not on file   ? Food insecurity - inability: Not on file   ? Transportation needs - medical: Not on file   ? Transportation needs - non-medical: Not on file   Occupational History   ? Not on file   Tobacco Use   ? Smoking status: Never Smoker   ? Smokeless tobacco: Never Used   Substance and Sexual Activity   ? Alcohol use: Yes     Comment: very rare    ? Drug use: No   ? Sexual activity: Not on file   Other Topics Concern   ? Not on file   Social History Narrative     The patient's son was present to provide the HPI.          Medications  Allergies   Current Outpatient Medications   Medication Sig Dispense Refill   ? acetaminophen (TYLENOL EXTRA STRENGTH) 500 MG tablet Take 500 mg by mouth every 6 (six) hours as needed for pain. Indications: Pain     ? allopurinol (ZYLOPRIM) 100 MG tablet Take 50 mg by mouth daily. Indications: prevention of acute gout attack     ? atorvastatin (LIPITOR) 80 MG tablet Take 1 tablet (80 mg total) by mouth daily. 90 tablet 3   ? clopidogrel (PLAVIX) 75 mg tablet Take 1 tablet (75 mg total) by mouth daily. 90 tablet 3   ? furosemide (LASIX) 20 MG tablet Take 1 tablet (20 mg total) by mouth daily. 90 tablet 3   ? hydrALAZINE (APRESOLINE) 50 MG tablet Take 1 tablet (50 mg total) by mouth 3 (three) times a day. 270 tablet 3   ? isosorbide mononitrate (IMDUR) 30 MG 24 hr tablet Take 1 tablet (30 mg total) by mouth daily. 90 tablet 3   ? levothyroxine (SYNTHROID) 88 MCG tablet Take 88 mcg by mouth Daily at 6:00 am. Indications: hypothyroidism     ? metoprolol succinate (TOPROL-XL) 100 MG 24 hr tablet Take 3 tablets (300 mg total) by mouth every evening. 270 tablet 3   ? nitroglycerin (NITROSTAT) 0.4 MG SL tablet Place 1 tablet (0.4 mg total) under the tongue every 5 (five) minutes as needed for chest pain. 1 Bottle 3   ? rivaroxaban 15 mg Tab Take 1 tablet (15 mg total) by mouth daily with supper. 30 tablet 3   ? predniSONE (DELTASONE) 20 MG tablet Take 20 mg by mouth daily as needed. Per ALEJANDRA Woodruff PA-C Take 20mg daily for gout       No current facility-administered medications for this visit.       No Known Allergies      Lab Results    Chemistry CBC BNP   Lab Results   Component Value Date    CREATININE 1.78 (H) 08/17/2018    BUN 32 (H) 08/17/2018     08/17/2018    K 4.4 08/17/2018     (H) 08/17/2018    CO2 25 08/17/2018     Creatinine (mg/dL)   Date Value   08/17/2018 1.78 (H)   05/22/2018 2.09 (H)   05/16/2018 2.02 (H)   04/25/2018 1.71 (H)     Lab Results   Component Value Date    WBC 8.6 05/16/2018    HGB 11.5 (L) 05/16/2018    HCT 34.3 (L) 05/16/2018    MCV 90 05/16/2018     05/16/2018    Lab Results   Component Value Date    BNP 81 (H) 05/16/2018     BNP (pg/mL)   Date Value   05/16/2018 81 (H)   04/17/2018 335 (H)   04/12/2018 956 (H)          25 minutes were spent with the patient with greater than 50% spent on education and counseling.      Marion Blunt, [facility]   Heart Failure Clinic

## 2021-06-27 NOTE — PROGRESS NOTES
Progress Notes by Marion Blunt CNP at 7/19/2019  1:30 PM     Author: Marion Blunt CNP Service: -- Author Type: Nurse Practitioner    Filed: 7/19/2019  2:12 PM Encounter Date: 7/19/2019 Status: Signed    : Marion Blunt CNP (Nurse Practitioner)           Click to link to [facility] HEART CARE NOTE      Assessment/Recommendations   Assessment:    1. Ischemic cardiomyopathy with systolic dysfunction, NYHA class I: Compensated.  No signs or symptoms of fluid retention.  His weights have remained stable.  He continues to follow a low-sodium diet.  He had an echocardiogram June 12, 2019 which did not show him improvement in his LVEF.    2.  Persistent atrial fibrillation: Continue Xarelto for anticoagulation.  He continues Toprol 300 mg daily    3.  Coronary artery disease: Denies any chest pain.  He continues Imdur, Plavix, and Lipitor.    Plan:  1.  Continue current medications  2.  Continue daily weights and low-sodium diet  3.  Encouraged regular exercise    Dillon Pennington will follow up with Dr. Russo in September and in the heart failure clinic in 6 months.     History of Present Illness    Mr. Dillon Pennington is a 64 y.o. male seen at [facility] heart failure clinic today for continued follow-up.  His daughter accompanies him today.  There is an  for the duration of the appointment.  He follows up for ischemic cardiomyopathy with systolic dysfunction.  He had an echocardiogram June 12, 2019 which showed an ejection fraction of 33% with mild to moderate mitral regurgitation.  He has a past medical history significant for coronary artery disease, hyperlipidemia, persistent atrial fibrillation, and chronic kidney disease.  He had an ICD placed April 2018.    Today, he complains of no acute heart failure symptoms.  He denies dyspnea on exertion, orthopnea, or PND.  He denies dizziness or lightheadedness.  He denies chest pain.  He denies bloating  or lower extremity edema.  He denies fatigue, lightheadedness, shortness of breath, dyspnea on exertion, orthopnea, PND, palpitations, chest pain, abdominal fullness/bloating and lower extremity edema.      He is monitoring home weights which are stable between 168-170 pounds.  He is following a low sodium diet.      ECHO 6/12/2019:   Summary       When compared to the previous study dated 7/11/2018, no significant change.    Left ventricle ejection fraction is moderately decreased. The calculated left ventricular ejection fraction is 33%.    Normal left ventricular size.    Normal right ventricular size and systolic function.    Mild to moderate mitral regurgitation    Device lead in right heart chambers          Physical Examination Review of Systems   Vitals:    07/19/19 1347   BP: 94/62   Pulse: 64   Resp: 16     Body mass index is 30.5 kg/m .  Wt Readings from Last 3 Encounters:   07/19/19 170 lb (77.1 kg)   06/20/19 163 lb (73.9 kg)   06/14/19 164 lb (74.4 kg)       General Appearance:     Alert, cooperative and in no acute distress.   ENT/Mouth: membranes moist, no oral lesions or bleeding gums.      EYES:  no scleral icterus, normal conjunctivae   Neck: no carotid bruits or thyromegaly   Chest/Lungs:   lungs are clear to auscultation, no rales or wheezing, respirations unlabored   Cardiovascular:   Regular. Normal first and second heart sounds with no murmurs, rubs, or gallops; the carotid, radial and posterior tibial pulses are intact, Jugular venous pressure normal, no edema   Abdomen:  Soft, nontender, nondistended, bowel sounds present   Extremities: no cyanosis or clubbing   Skin: warm, dry.    Neurologic: mood and affect are appropriate, alert and oriented x3      General: WNL  Eyes: WNL  Ears/Nose/Throat: WNL  Lungs: WNL  Heart: WNL  Stomach: WNL  Bladder: WNL  Muscle/Joints: WNL  Skin: WNL  Nervous System: Daytime Sleepiness  Mental Health: WNL     Blood: WNL     Medical History  Surgical History  Family History Social History   Past Medical History:   Diagnosis Date   ? DMITRI (acute kidney injury) (H)    ? CKD (chronic kidney disease)    ? Dyslipidemia, goal LDL below 70 4/16/2018   ? Mushroom poisoning 9/11/2006   ? Nonischemic cardiomyopathy (H)    ? Paroxysmal atrial fibrillation (H)    ? Persistent atrial fibrillation (H) 4/12/2018    Past Surgical History:   Procedure Laterality Date   ? CHOLECYSTECTOMY     ? CV CORONARY ANGIOGRAM N/A 4/18/2018    Procedure: Coronary Angiogram;  Surgeon: Heriberto Cheung MD;  Location: [facility] Cath Lab;  Service:    ? CV CORONARY ANGIOGRAM N/A 4/20/2018    Procedure: Coronary Angiogram;  Surgeon: Javon Carrington MD;  Location: [facility] Cath Lab;  Service:    ? CV IMPELLA INSERT N/A 4/20/2018    Procedure: Impella Insert;  Surgeon: Javon Carrington MD;  Location: [facility] Cath Lab;  Service:    ? CV LEFT HEART CATHETERIZATION WO LEFT VETRICULOGRAM Left 4/18/2018    Procedure: Left Heart Catheterization Without Left Ventriculogram;  Surgeon: Heriberto Cheung MD;  Location: [facility] Cath Lab;  Service:    ? EP ICD INSERT N/A 4/23/2018    Procedure: EP ICD Insert;  Surgeon: Rakesh Rivera MD;  Location: [facility] Cath Lab;  Service:    ? PICC  4/18/2018         History reviewed. No pertinent family history. Social History     Socioeconomic History   ? Marital status:      Spouse name: Soto Rivera   ? Number of children: Not on file   ? Years of education: Not on file   ? Highest education level: Not on file   Occupational History   ? Not on file   Social Needs   ? Financial resource strain: Not on file   ? Food insecurity:     Worry: Not on file     Inability: Not on file   ? Transportation needs:     Medical: Not on file     Non-medical: Not on file   Tobacco Use   ? Smoking status: Never Smoker   ? Smokeless tobacco: Never Used   Substance and Sexual Activity   ? Alcohol use: Yes     Comment: very rare    ? Drug use: No   ? Sexual  activity: Not on file   Lifestyle   ? Physical activity:     Days per week: Not on file     Minutes per session: Not on file   ? Stress: Not on file   Relationships   ? Social connections:     Talks on phone: Not on file     Gets together: Not on file     Attends Yarsani service: Not on file     Active member of club or organization: Not on file     Attends meetings of clubs or organizations: Not on file     Relationship status: Not on file   ? Intimate partner violence:     Fear of current or ex partner: Not on file     Emotionally abused: Not on file     Physically abused: Not on file     Forced sexual activity: Not on file   Other Topics Concern   ? Not on file   Social History Narrative    The patient's son was present to provide the HPI.          Medications  Allergies   Current Outpatient Medications   Medication Sig Dispense Refill   ? acetaminophen (TYLENOL EXTRA STRENGTH) 500 MG tablet Take 500 mg by mouth every 6 (six) hours as needed for pain. Indications: Pain     ? allopurinol (ZYLOPRIM) 300 MG tablet Take 300 mg by mouth daily.     ? atorvastatin (LIPITOR) 80 MG tablet Take 1 tablet (80 mg total) by mouth daily. 90 tablet 3   ? cetirizine (ZYRTEC) 10 MG tablet Take 10 mg by mouth daily.  1   ? cholecalciferol, vitamin D3, (VITAMIN D3) 5,000 unit Tab Take 5,000 Units by mouth Daily at 5 pm.     ? clopidogrel (PLAVIX) 75 mg tablet Take 1 tablet (75 mg total) by mouth daily. 90 tablet 3   ? furosemide (LASIX) 20 MG tablet Take 1 tablet (20 mg total) by mouth daily. 90 tablet 3   ? hydrALAZINE (APRESOLINE) 50 MG tablet Take 1 tablet (50 mg total) by mouth 3 (three) times a day. 270 tablet 3   ? isosorbide mononitrate (IMDUR) 30 MG 24 hr tablet Take 1 tablet (30 mg total) by mouth daily. 90 tablet 3   ? levothyroxine (SYNTHROID) 88 MCG tablet Take 88 mcg by mouth Daily at 6:00 am. Indications: hypothyroidism     ? metoprolol succinate (TOPROL-XL) 100 MG 24 hr tablet Take 3 tablets (300 mg total) by mouth  every evening. 270 tablet 3   ? nitroglycerin (NITROSTAT) 0.4 MG SL tablet Place 1 tablet (0.4 mg total) under the tongue every 5 (five) minutes as needed for chest pain. 1 Bottle 3   ? rivaroxaban 15 mg Tab Take 1 tablet (15 mg total) by mouth daily with supper. 30 tablet 3     No current facility-administered medications for this visit.       No Known Allergies      Lab Results    Chemistry CBC BNP   Lab Results   Component Value Date    CREATININE 1.78 (H) 08/17/2018    BUN 32 (H) 08/17/2018     08/17/2018    K 4.4 08/17/2018     (H) 08/17/2018    CO2 25 08/17/2018     Creatinine (mg/dL)   Date Value   08/17/2018 1.78 (H)   05/22/2018 2.09 (H)   05/16/2018 2.02 (H)   04/25/2018 1.71 (H)    Lab Results   Component Value Date    WBC 8.6 05/16/2018    HGB 11.5 (L) 05/16/2018    HCT 34.3 (L) 05/16/2018    MCV 90 05/16/2018     05/16/2018    Lab Results   Component Value Date    BNP 81 (H) 05/16/2018     BNP (pg/mL)   Date Value   05/16/2018 81 (H)   04/17/2018 335 (H)   04/12/2018 956 (H)          25 minutes were spent with the patient with greater than 50% spent on education and counseling.      Marion Blunt, [facility]   Heart Failure Clinic

## 2021-06-28 NOTE — PROGRESS NOTES
Progress Notes by Parth Russo MD at 2/19/2020  1:30 PM     Author: Parth Russo MD Service: -- Author Type: Physician    Filed: 2/20/2020 10:39 AM Encounter Date: 2/19/2020 Status: Signed    : Parth Russo MD (Physician)             [facility] Progress Note    Assessment:  1.  Cough.  Patient here for routine follow-up of his ischemic cardiomyopathy with device interrogation.  Patient is wearing a mask and has had a productive cough over the past few weeks which has been progressive.  He does not appear toxic but because of the persistent nature of the cough will plan for more acute evaluation in the emergency room.  I have placed a call to the emergency room to discuss.    2.  Ischemic cardiomyopathy.  Multivessel stenting as outlined April 2018.  No clear-cut complaints of angina.  He does remain on Plavix in combination with Xarelto and ultimately will need to make a decision about whether we should continue Plavix with a higher risk of bleeding.  Echocardiogram from 2019 revealed an ejection fraction of 33% with mild to moderate mitral regurgitation.  We are going to plan follow-up echocardiogram    3. Atrial fibrillation.  Device interrogation continues to show some increased heart rate 30 to 35% of the time.  He is on 200 mg of metoprolol.  His blood pressure is improved and conceivably could try to increase the metoprolol but I am going to correspond with Dr. Rivera about additional treatment options and will plan the echocardiogram and further discussion.    4.  Dyslipidemia.  Cholesterol numbers in his primary care physician in May 2019 finds a total cholesterol 122, LDL 55.      Plan: Note sent to my colleague Dr. Rivera.  Echocardiogram has been ordered.  Plan to patient seen in the ER because of the productive cough.    1. Dyspnea  Echo Complete    ECG Clinic - Today   2. Chronic kidney disease (CKD), stage III (moderate) (H)     3. Dilated cardiomyopathy (H)      4. Persistent atrial fibrillation     5. Coronary artery disease involving native coronary artery of native heart without angina pectoris     6. Dyslipidemia, goal LDL below 70     7. Chronic systolic heart failure (H)     8. Cough           An After Visit Summary was printed and given to the patient.    Subjective:    Dillon Pennington is a 65 y.o. male who returned for a planned  follow up visit.  Patient is here today for routine follow-up of his ischemic cardiomyopathy and had device interrogated.  There has been some concern regarding the atrial fibrillation rate being suboptimally controlled and he saw Dr. Rivera in December who commented that the atrial fibrillation response rate was still elevated.  Time that I saw him September 2019 he was feeling well.  He states that he was not experiencing any significant shortness of breath except if he walked briskly.  He had not been experiencing any chest pain or dizziness.      He does tell me that he has had a productive cough over the past 3 to 4 weeks and has had more dyspnea related to this cough.  He states that it is greenish in color but no clear fever.  Is wearing a mask.  He tells me that he does have some chest discomfort but this appears to be associated with coughing and not clearly anginal in character although history is obtained through the use of the .  He is not describing orthopnea, PND, significant dizziness or weight gain.    He has a history of ischemic cardiomyopathy as previously outlined.  He underwent a coronary intervention April 2018 with a critical mid LAD stenosis circumflex and high-grade diagonal with PCI to 3 vessels with Impella support.  He has a history of inappropriate shock with higher heart rate.  During our last visit I cut back on the hydralazine because of some trend towards lower blood pressure.  Has been maintained on a combination of Plavix and Xarelto.    Had with his primary care provider based on review of  chart records December 2019.  Amoxicillin is listed as 1 of the medications but I cannot determine if this was because of the cough at that time.  Lipids are reported from May 2019 from his primary care physician's office were total cholesterol was 122 and LDL of 55.    Review of Systems:   General: WNL  Eyes: WNL  Ears/Nose/Throat: WNL  Lungs: Cough, Wheezing  Heart: Chest Pain, Shortness of Breath with activity  Stomach: WNL  Bladder: WNL  Muscle/Joints: WNL  Skin: WNL  Nervous System: WNL  Mental Health: WNL     Blood: WNL      Problem List:    Patient Active Problem List   Diagnosis   ? Chronic kidney disease (CKD), stage III (moderate) (H)   ? Chronic systolic heart failure (H)   ? Dilated cardiomyopathy (H)   ? Persistent atrial fibrillation   ? VF (ventricular fibrillation) (H)   ? Coronary artery disease involving native coronary artery of native heart without angina pectoris   ? Ventricular tachycardia (H)   ? Anemia   ? Dyslipidemia, goal LDL below 70   ? ICD (implantable cardioverter-defibrillator) in place, dual chamber   ? Cough       Social History     Socioeconomic History   ? Marital status:      Spouse name: Soto Rivera   ? Number of children: Not on file   ? Years of education: Not on file   ? Highest education level: Not on file   Occupational History   ? Not on file   Social Needs   ? Financial resource strain: Not on file   ? Food insecurity:     Worry: Not on file     Inability: Not on file   ? Transportation needs:     Medical: Not on file     Non-medical: Not on file   Tobacco Use   ? Smoking status: Never Smoker   ? Smokeless tobacco: Never Used   Substance and Sexual Activity   ? Alcohol use: Yes     Comment: very rare    ? Drug use: No   ? Sexual activity: Not on file   Lifestyle   ? Physical activity:     Days per week: Not on file     Minutes per session: Not on file   ? Stress: Not on file   Relationships   ? Social connections:     Talks on phone: Not on file     Gets together: Not  on file     Attends [employer] service: Not on file     Active member of club or organization: Not on file     Attends meetings of clubs or organizations: Not on file     Relationship status: Not on file   ? Intimate partner violence:     Fear of current or ex partner: Not on file     Emotionally abused: Not on file     Physically abused: Not on file     Forced sexual activity: Not on file   Other Topics Concern   ? Not on file   Social History Narrative    The patient's son was present to provide the HPI.       No family history on file.    Current Outpatient Medications   Medication Sig Dispense Refill   ? allopurinol (ZYLOPRIM) 300 MG tablet Take 300 mg by mouth daily.     ? atorvastatin (LIPITOR) 80 MG tablet Take 1 tablet (80 mg total) by mouth daily. (Patient taking differently: Take 80 mg by mouth daily.       ) 90 tablet 3   ? cholecalciferol, vitamin D3, (VITAMIN D3) 5,000 unit Tab Take 5,000 Units by mouth Daily at 5 pm.     ? clopidogrel (PLAVIX) 75 mg tablet Take 1 tablet (75 mg total) by mouth daily. (Patient taking differently: Take 75 mg by mouth daily.       ) 90 tablet 3   ? furosemide (LASIX) 20 MG tablet Take 1 tablet (20 mg total) by mouth daily. (Patient taking differently: Take 20 mg by mouth daily.       ) 90 tablet 3   ? hydrALAZINE (APRESOLINE) 25 MG tablet TAKE 1 PILL (25 MG TOTAL) BY MOUTH 3 TIMES EVERYDAY/ TXHUA HNUB NOJ 1 LUB TSHUAJ 3 ZAUG 270 tablet 0   ? levothyroxine (SYNTHROID) 88 MCG tablet Take 88 mcg by mouth Daily at 6:00 am. Indications: hypothyroidism     ? metoprolol succinate (TOPROL-XL) 100 MG 24 hr tablet Take 2 tablets (200 mg total) by mouth daily. 270 tablet 3   ? nitroglycerin (NITROSTAT) 0.4 MG SL tablet Place 1 tablet (0.4 mg total) under the tongue every 5 (five) minutes as needed for chest pain. (Patient taking differently: Place 0.4 mg under the tongue every 5 (five) minutes as needed for chest pain.       ) 1 Bottle 3   ? rivaroxaban ANTICOAGULANT (XARELTO) 15 mg  "tablet Take 1 tablet (15 mg total) by mouth daily with supper. 90 tablet 3   ? acetaminophen (TYLENOL EXTRA STRENGTH) 500 MG tablet Take 500 mg by mouth every 6 (six) hours as needed for pain. Indications: Pain     ? cetirizine (ZYRTEC) 10 MG tablet Take 1 tablet by mouth daily.  0   ? fluticasone (VERAMYST) 27.5 mcg/actuation nasal spray 1 spray into each nostril at bedtime. Indications: inflammation of the nose due to an allergy       No current facility-administered medications for this visit.        Objective:     /84 (Patient Site: Right Arm, Patient Position: Sitting, Cuff Size: Adult Regular)   Pulse 68   Resp 16   Ht 5' 4\" (1.626 m)   Wt 169 lb (76.7 kg)   BMI 29.01 kg/m    169 lb (76.7 kg)   [unfilled]  Wt Readings from Last 3 Encounters:   02/19/20 169 lb (76.7 kg)   02/17/20 169 lb (76.7 kg)   12/02/19 172 lb (78 kg)       Physical Exam:    GENERAL APPEARANCE: alert, has a facemask in place does not appear sick.  HEENT: no scleral icterus or xanthelasma  NECK: jugular venous pressure does not appear elevated.  CHEST: symmetric, the lungs at the bases with few crackles suggested.  No clear wheezing.  CARDIOVASCULAR: Irregular, soft systolic murmur  Abdomen: No Organomegaly, masses, bruits, or tenderness. Bowels sounds are present      EXTREMITIES: no cyanosis, clubbing or edema    Cardiac Testing:  Atrial fibrillation with controlled ventricular response, no significant ST-T segment changes August 2018 atrial fibrillation has replaced atrial paced rhythm, QTC appears shorter    Lab Results:    Lab Results   Component Value Date     08/17/2018    K 4.4 08/17/2018     (H) 08/17/2018    CO2 25 08/17/2018    BUN 32 (H) 08/17/2018    CREATININE 1.78 (H) 08/17/2018    CALCIUM 9.0 08/17/2018     Lab Results   Component Value Date    CHOL 156 08/17/2018    TRIG 201 (H) 08/17/2018    HDL 43 08/17/2018     BNP (pg/mL)   Date Value   05/16/2018 81 (H)   04/17/2018 335 (H)   04/12/2018 956 " (H)     Creatinine (mg/dL)   Date Value   08/17/2018 1.78 (H)   05/22/2018 2.09 (H)   05/16/2018 2.02 (H)   04/25/2018 1.71 (H)     LDL Calculated (mg/dL)   Date Value   08/17/2018 73   04/14/2018 129     Lab Results   Component Value Date    WBC 6.8 11/02/2019    HGB 16.4 11/02/2019    HCT 47.8 11/02/2019    MCV 92 11/02/2019     11/02/2019         This note has been dictated using voice recognition software. Any grammatical or context distortions are unintentional and inherent to the software.

## 2021-06-28 NOTE — PROGRESS NOTES
Progress Notes by Rakesh Rivera MD at 2/20/2020  1:36 PM     Author: Rakesh Rivera MD Service: -- Author Type: Physician    Filed: 2/20/2020  1:38 PM Encounter Date: 2/20/2020 Status: Signed    : Rakesh Rivera MD (Physician)       Message   Received: Yesterday   Message Contents   Parth Russo MD Granrud, Gregory A, MD; Viji Marks RN             Hi Goldy   I saw Dillon today.  He is having a productive cough and therefore likely has an upper respiratory infection which is going to be investigated but his device interrogation demonstrates ongoing rapid heart rate 30 to 35% of the time in atrial fibrillation.  He is on 200 mg of metoprolol.  His blood pressure was higher today previous blood pressures being somewhat lower which prompted creasing the dose of hydralazine.  You had mentioned the possibility of AV node ablation and I am going to plan a follow-up echocardiogram but wanted to touch base with you.  Thanks Alberto      Chronic atrial fibrillation with narrow QRS  Heart rate above 120 about 25% of the time-inadequate rate control  He has dual-chamber ICD  Plan  Start diltiazem 180 mg daily for better ventricular rate control  Dr. Russo is going to repeat echocardiogram to assess left ventricular function  If unable to control ventricular rate with medications, then would consider upgrading his system to a biventricular system and proceeding to AV node ablation

## 2021-06-28 NOTE — PROGRESS NOTES
Progress Notes by Irlanda Edwards NP at 10/29/2019  9:50 AM     Author: Irlanda Edwards NP Service: -- Author Type: Nurse Practitioner    Filed: 10/29/2019 11:41 AM Encounter Date: 10/29/2019 Status: Signed    : Irlanda Edwards NP (Nurse Practitioner)             Assessment/Recommendations   Assessment:    1. Hypotension: Most recent blood pressure was at 98/62.  He was recommended to reduce hydralazine from 50 mg 3 times a day to 25 mg 3 times a day.    Today his blood pressure is 118/80.  Blood pressures improved.  He reports his home blood pressure running systolic in 120s.  He feels no difference with high blood pressure reading.  However he has been asymptomatic with low blood pressure.    2.  Ischemic cardiomyopathy with systolic dysfunction s/p ICD, NYHA class II with EF of 33% in June 2019:  Well compensated with no acute signs and symptoms of heart failure on assessment today.  His home weight has been stable between 169 to 171 pounds.  He reports following low-sodium diet.  He denies shortness of breath, PND orthopnea.  He reports compliant with taking all his medications as prescribed and set up by his home care nurse.      3.  Persistent atrial fibrillation: Heart rate 2 days controlled in 70s.  He is asymptomatic.  Most recent device check showed persistent heart rate greater than 120 bpm  25% of the time.   He is currently on  metoprolol 300 mg daily.    He is on Xarelto for anticoagulation.  He denies any bleeding complication.  He is scheduled to see Dr. Rivera in December.    4. Chronic kidney disease stage III: Most recent potassium is 3.9, sodium level 143, BUN 31 and creatinine 1.58 in May 2019.       Plan:  1.  Continue current heart failure regimen  Heart failure medications:  - Beta blocker therapy with metroprolol succinate 300 mg daily  -  Diuretic therapy with furosemide furosemide 20 mg daily.  - Hydralazine 25 mg 3 times a day  - Isosorbide mononitrate 30 mg daily     2.  Continue  current regimen for atrial fibrillation.  Monitor for bleeding.     3.  Patient was encouraged to call if having symptoms of shortness of breath, heart palpitations, heart racing, fatigue, lightheadedness or dizziness.     3.  Reinforced low-sodium diet less than 2 g/day, daily weight, fluid intake per recommendation from primary nephrology, and stay active as tolerated.    4.  Follow-up with nephrology as scheduled in November     F/u with Dr. Rivera in December as scheduled. F/u with Dr. Russo in January 2019. F/u with Marion Blunt, in 6 months or sooner if needed      History of Present Illness/Subjective    Mr. Dillon Pennington is a 63 y.o. male with a significant past medical history of ischemic cardiomyopathy with systolic dysfunction, ventricular tachycardia, ventricular fibrillation, status post ICD, coronary artery disease with status post PCI to diagonal/mid LAD/mid circumflex, and chronic kidney disease who is seen at [facility] heart failure clinic today for BP follow-up per Dr. Russo.     Patient recently saw Dr. Russo.  His recent device check showed persistent elevated heart rate 25% for time although he is on high dose of metoprolol.  Dr. Russo is going to discuss with Dr. Rivera about his heart rate control management.  Dr. Russo has mentioned to consider switching him from Plavix to aspirin if he tolerates since he is on Xarelto.  Kelechi Rubalcava, CNP has recommended to consider AV node ablation and upgrade to CRT-D if he does not respond to the higher dose of metoprolol.His most recent echocardiogram showed ejection fraction stable at 33%.    Today, patient is here accompanied by a [facility] .  He reports that he has been stable since he was last seen by Dr. Russo recently.  His blood pressures improved since his hydralazine dose was reduced.  However he has been asymptomatic with low blood pressure.  He feels about the same.  He denies having any chest pain, shortness of breath,  lightheaded, dizziness, abdominal bloating or lower extremity edema, PND orthopnea heart palpitation, heart racing or bleeding complications.     He reports his home weight stable at 169 to 171 pounds.  He reports following low-sodium diet.  He walks about 2-3 blocks every day without any cardiac symptoms.    He has a home care nurse who helps him set up his medications.  He has been taking the medications as prescribed and set up by his home care nurse.     Physical Examination Review of Systems   Vitals:    10/29/19 0954   BP: 118/80   Pulse: 79   Resp: 14     Body mass index is 31.28 kg/m .  Wt Readings from Last 3 Encounters:   10/29/19 171 lb (77.6 kg)   09/10/19 169 lb (76.7 kg)   07/19/19 170 lb (77.1 kg)       General Appearance:   no distress, normal body habitus   ENT/Mouth: membranes moist, no oral lesions or bleeding gums.      EYES:  no scleral icterus, normal conjunctivae   Neck: no carotid bruits or thyromegaly   Chest/Lungs:   lungs are clear to auscultation, no rales or wheezing,  equal chest wall expansion except mild crackles in bilateral bases   Cardiovascular:   . Normal first and second heart sounds with no murmurs, rubs, or gallops; the carotid, radial and posterior tibial pulses are intact, no edema bilaterally    Abdomen:   Large but nondistended, no organomegaly, masses, bruits, or tenderness; bowel sounds are present   Extremities: no cyanosis or clubbing   Skin: no xanthelasma, warm.    Neurologic: normal  bilateral, no tremors     Psychiatric: alert and oriented x3, calm     General: WNL  Eyes: WNL  Ears/Nose/Throat: WNL  Lungs: WNL  Heart: WNL  Stomach: WNL  Bladder: WNL  Muscle/Joints: WNL  Skin: WNL  Nervous System: WNL  Mental Health: WNL     Blood: WNL     Medical History  Surgical History Family History Social History   Past Medical History:   Diagnosis Date   ? DMITRI (acute kidney injury) (H)    ? CKD (chronic kidney disease)    ? Dyslipidemia, goal LDL below 70 4/16/2018   ?  Mushroom poisoning 9/11/2006   ? Nonischemic cardiomyopathy (H)    ? Paroxysmal atrial fibrillation (H)    ? Persistent atrial fibrillation 4/12/2018    Past Surgical History:   Procedure Laterality Date   ? CHOLECYSTECTOMY     ? CV CORONARY ANGIOGRAM N/A 4/18/2018    Procedure: Coronary Angiogram;  Surgeon: Heriberto Cheung MD;  Location: [facility];  Service:    ? CV CORONARY ANGIOGRAM N/A 4/20/2018    Procedure: Coronary Angiogram;  Surgeon: Javon Carrington MD;  Location: [facility];  Service:    ? CV IMPELLA INSERT N/A 4/20/2018    Procedure: Impella Insert;  Surgeon: Javon Carrington MD;  Location: [facility];  Service:    ? CV LEFT HEART CATHETERIZATION WO LEFT VETRICULOGRAM Left 4/18/2018    Procedure: Left Heart Catheterization Without Left Ventriculogram;  Surgeon: Heriberto Cheung MD;  Location: [facility];  Service:    ? EP ICD INSERT N/A 4/23/2018    Procedure: EP ICD Insert;  Surgeon: Rakesh Rivera MD;  Location: [facility];  Service:    ? PICC  4/18/2018         No family history on file. Social History     Socioeconomic History   ? Marital status:      Spouse name: Soto Rivera   ? Number of children: Not on file   ? Years of education: Not on file   ? Highest education level: Not on file   Occupational History   ? Not on file   Social Needs   ? Financial resource strain: Not on file   ? Food insecurity:     Worry: Not on file     Inability: Not on file   ? Transportation needs:     Medical: Not on file     Non-medical: Not on file   Tobacco Use   ? Smoking status: Never Smoker   ? Smokeless tobacco: Never Used   Substance and Sexual Activity   ? Alcohol use: Yes     Comment: very rare    ? Drug use: No   ? Sexual activity: Not on file   Lifestyle   ? Physical activity:     Days per week: Not on file     Minutes per session: Not on file   ? Stress: Not on file   Relationships   ? Social connections:     Talks on phone: Not on  file     Gets together: Not on file     Attends Anabaptist service: Not on file     Active member of club or organization: Not on file     Attends meetings of clubs or organizations: Not on file     Relationship status: Not on file   ? Intimate partner violence:     Fear of current or ex partner: Not on file     Emotionally abused: Not on file     Physically abused: Not on file     Forced sexual activity: Not on file   Other Topics Concern   ? Not on file   Social History Narrative    The patient's son was present to provide the HPI.          Medications  Allergies   Current Outpatient Medications   Medication Sig Dispense Refill   ? acetaminophen (TYLENOL EXTRA STRENGTH) 500 MG tablet Take 500 mg by mouth every 6 (six) hours as needed for pain. Indications: Pain     ? allopurinol (ZYLOPRIM) 300 MG tablet Take 300 mg by mouth daily.     ? atorvastatin (LIPITOR) 80 MG tablet Take 1 tablet (80 mg total) by mouth daily. (Patient taking differently: Take 80 mg by mouth daily.       ) 90 tablet 3   ? cholecalciferol, vitamin D3, (VITAMIN D3) 5,000 unit Tab Take 5,000 Units by mouth Daily at 5 pm.     ? clopidogrel (PLAVIX) 75 mg tablet Take 1 tablet (75 mg total) by mouth daily. (Patient taking differently: Take 75 mg by mouth daily.       ) 90 tablet 3   ? fluticasone (VERAMYST) 27.5 mcg/actuation nasal spray 1 spray into each nostril at bedtime. Indications: inflammation of the nose due to an allergy     ? furosemide (LASIX) 20 MG tablet Take 1 tablet (20 mg total) by mouth daily. (Patient taking differently: Take 20 mg by mouth daily.       ) 90 tablet 3   ? hydrALAZINE (APRESOLINE) 25 MG tablet Take 1 tablet (25 mg total) by mouth 3 (three) times a day. (Patient taking differently: Take 25 mg by mouth 3 (three) times a day.       ) 270 tablet 0   ? isosorbide mononitrate (IMDUR) 30 MG 24 hr tablet Take 1 tablet (30 mg total) by mouth daily. (Patient taking differently: Take 30 mg by mouth daily.       ) 90 tablet 3    ? levothyroxine (SYNTHROID) 88 MCG tablet Take 88 mcg by mouth Daily at 6:00 am. Indications: hypothyroidism     ? metoprolol succinate (TOPROL-XL) 100 MG 24 hr tablet Take 3 tablets (300 mg total) by mouth every evening. (Patient taking differently: Take 300 mg by mouth every evening.       ) 270 tablet 3   ? rivaroxaban 15 mg Tab Take 1 tablet (15 mg total) by mouth daily with supper. (Patient taking differently: Take 15 mg by mouth daily with supper.       ) 30 tablet 3   ? nitroglycerin (NITROSTAT) 0.4 MG SL tablet Place 1 tablet (0.4 mg total) under the tongue every 5 (five) minutes as needed for chest pain. (Patient taking differently: Place 0.4 mg under the tongue every 5 (five) minutes as needed for chest pain.       ) 1 Bottle 3     No current facility-administered medications for this visit.     No Known Allergies      Lab Results    Chemistry/lipid CBC Cardiac Enzymes/BNP/TSH/INR   Lab Results   Component Value Date    CHOL 156 08/17/2018    HDL 43 08/17/2018    LDLCALC 73 08/17/2018    TRIG 201 (H) 08/17/2018    CREATININE 1.78 (H) 08/17/2018    BUN 32 (H) 08/17/2018    K 4.4 08/17/2018     08/17/2018     (H) 08/17/2018    CO2 25 08/17/2018    Lab Results   Component Value Date    WBC 8.6 05/16/2018    HGB 11.5 (L) 05/16/2018    HCT 34.3 (L) 05/16/2018    MCV 90 05/16/2018     05/16/2018    Lab Results   Component Value Date    CKTOTAL 57 02/16/2018    TROPONINI <0.01 05/16/2018    BNP 81 (H) 05/16/2018    TSH 0.39 08/17/2018    INR 1.19 (H) 04/29/2018        This note has been dictated using voice recognition software. Any grammatical, typographical, or context distortions are unintentional and inherent to the software

## 2021-06-29 NOTE — PROGRESS NOTES
"Progress Notes by Parth Russo MD at 6/4/2020 12:50 PM     Author: Parth Russo MD Service: -- Author Type: Physician    Filed: 6/4/2020  1:27 PM Encounter Date: 6/4/2020 Status: Signed    : Parth Russo MD (Physician)           The patient has been notified of following:     \"This telephone visit will be conducted via a call between you and your physician/provider. We have found that certain health care needs can be provided without the need for a physical exam.  This service lets us provide the care you need with a phone conversation.  If a prescription is necessary we can send it directly to your pharmacy.  If lab work is needed we can place an order for that and you can then stop by our lab to have the test done at a later time. If during the course of the call the physician/provider feels a telephone visit is not appropriate, you will not be charged for this service.\" Verbal consent has been obtained for this service by care team member:         HEART CARE PHONE ENCOUNTER        The patient has chosen to have the visit conducted as a telephone visit, to reduce risk of exposure given the current status of Coronavirus in our community. This telephone visit is being conducted via a call between the patient and physician/provider. Health care needs are being provided without a physical exam.     Assessment/Recommendations   Assessment:    1.  Coronary artery disease/ischemic cardiomyopathy.  Multivessel stenting as outlined April 2018 without complaints of angina.  Symptoms of dyspnea appear stable with functional class II-III heart failure symptoms.  He will continue to monitor for symptoms and update us with any change.  2.  Persistent atrial fibrillation.  Heart rate overall appears improved.  Diltiazem added to the metoprolol in the setting of LV dysfunction but no worsening heart failure with this change.  I appreciate discussion with Dr. Rivera and will continue to monitor closely and at " this point not pursue AV node ablation.  He continues on Xarelto.  He is not reporting any bleeding issues.  3.  Hypertension.  Blood pressure appears to be under reasonable control.  He does have chronic renal insufficiency has been seen by renal in the past and does have a follow-up appointment for his report.  4.  Dyslipidemia.  In May 2019 LDL cholesterol 55.  He is on atorvastatin 80 mg daily.  5.  Scratchy throat and periodic cough.  He wonders whether this is related to the metoprolol but he states it is mild and at this point he is not going to make any changes in medication.  I have encouraged him to follow-up with his primary care physician in this regard and to update us if symptoms are worsening or progressive.  Plan:  1.   Laboratory studies and follow-up in 2 to 3 months.    Follow Up Plan: Follow up in 2 to 3 months.  I have reviewed the note as documented.  This accurately captures the substance of my conversation with the patient.    Total time of call between patient and provider was 30 minutes   Start Time:1245   Stop Time:115        History of Present Illness/Subjective    Dillon Pennington is a 65 y.o. male who is being evaluated via a billable telephone visit.  He was seen with the help of the .  He states that overall he is been feeling well.  He denies chest discomfort, dizziness, lightheadedness, orthopnea, PND or shortness of breath at rest.  He states that he if he walks on level ground in a reasonable but not to fast pace he does not experience shortness of breath.  He states he climbs up 1 flight of stairs at home regularly and has perhaps some mild shortness of breath.  His knee was the most significant problem although knee symptoms are improved.  He does mention that he feels a scratchy throat or a cough after taking possibly the metoprolol.  He states that he is not having any significant shortness of breath or throat closing symptoms.  We spent some time discussing the heart  related findings.  Recently there has been some discussion about the possibility of AV node ablation but with the current combination medications rate control appears to be improved.  He appears to tolerate the diltiazem even in the setting of reduced left ventricular function in combination with metoprolol.  He is on hydralazine but I do not see isosorbide listed and want to clarify his medications which we will try to do through the pharmacy.  At this point given that he is feeling well with the current plan is to avoid AV node ablation and monitor closely.  The most recent pacemaker interrogation demonstrated elevated heart rates to be improved and currently 5 to 10% of the time without associated symptoms.    He has a history of ischemic cardiomyopathy as previously outlined.  He underwent coronary intervention April 2018 with critical mid LAD stenosis, circumflex and high-grade diagonal stenosis with Impella support intervention.  He has not had any anginal chest discomfort or overt heart failure symptoms.  He had an upper respiratory infection when we last visited which he states is improved.    dimyopathy    Indications     Systolic congestive heart failure, unspecified congestive heart failure chronicity [I50.20 (ICD-10-CM)]     Procedure Status     Procedure scheduled as Urgent (Inpatient).          Conclusion          Estimated blood loss was <20 ml.    1st Diag lesion 85% stenosed.    A drug eluting stent was successfully placed.    Mid LAD lesion 99% stenosed.    A drug eluting stent was successfully placed.    Mid Cx lesion 90% stenosed.    A stent was successfully placed.    The LM vessel was moderate.     Pt with ischemic CM CKD with long discussion with hte family today re CABG vs PCI and family wished to have PCI     Procedure performed with Dr. Cheung and myself     Access:  Left radial for coronary access  Right femoral access with US preclose placed 14 F and impella insertion prior to  PCI     Angiography:  LM min dz ostium  LAD severe dz bolivar glesion  Mid and in diagonal, far distal severe diffuse dz  Circ mid severe dz     PCI:  1. Deployed two DEJUAN to diagonal  2. Deployed two DEJUAN to mid LAD (long lesion), post IVUS confirmed aposition  3. Deployed DEJUAN to mid Circ     Removed impella with use perclose sutures with 3+ dorsalis pulse post     Imp/plan:  1. Ischemic CM - s/p PCI LAD/Circ with DEJUAN - asp/plavix, stop heparin, carvedilol, statin (titrate up carvedilol as tolerated)  2. VT/VF - possible polymorphic VT from ischemic and/or low magnesium on amiodarone with preexisiting risk - discuss with EP re secondary prevention with ICD  3. Afib - in SR on amiodoane, cont for now iv over night then change to oral tomorrow.  4. MR - moderate - repeat echo   5. Severe CKD - 202mL iodixanol -cont hydration today,nephrology following      [facility]  SERVICES [[id number]]    Patient Information     Patient Name   Dillon Pennington  MRN   [id number]  Sex   Male   1   1955 (64 y.o.)    Indications     Dx: CAD (coronary artery disease) [I25.10 (ICD-10-CM)]    Summary       When compared to the previous study dated 2018, no significant change.    Left ventricle ejection fraction is moderately decreased. The calculated left ventricular ejection fraction is 33%.    Normal left ventricular size.    Normal right ventricular size and systolic function.    Mild to moderate mitral regurgitation    Device lead in right heart chambers              I have reviewed and updated the patient's Past Medical History, Social History, Family History and Medication List.     Physical Examination not performed given phone encounter Review of Systems                                                Medical History  Surgical History Family History Social History   Past Medical History:   Diagnosis Date   ? DMITRI (acute kidney injury) (H)    ? CKD (chronic kidney disease)    ? Dyslipidemia, goal LDL below 70 2018   ?  Mushroom poisoning 9/11/2006   ? Nonischemic cardiomyopathy (H)    ? Paroxysmal atrial fibrillation (H)    ? Persistent atrial fibrillation 4/12/2018    Past Surgical History:   Procedure Laterality Date   ? CHOLECYSTECTOMY     ? CV CORONARY ANGIOGRAM N/A 4/18/2018    Procedure: Coronary Angiogram;  Surgeon: Heriberto Cheung MD;  Location: [facility];  Service:    ? CV CORONARY ANGIOGRAM N/A 4/20/2018    Procedure: Coronary Angiogram;  Surgeon: Javon Carrington MD;  Location: [facility];  Service:    ? CV IMPELLA INSERT N/A 4/20/2018    Procedure: Impella Insert;  Surgeon: Javon Carrington MD;  Location: [facility];  Service:    ? CV LEFT HEART CATHETERIZATION WO LEFT VETRICULOGRAM Left 4/18/2018    Procedure: Left Heart Catheterization Without Left Ventriculogram;  Surgeon: Heriberto Cheung MD;  Location: [facility];  Service:    ? EP ICD INSERT N/A 4/23/2018    Procedure: EP ICD Insert;  Surgeon: Rakesh Rivera MD;  Location: [facility];  Service:    ? PICC  4/18/2018         No family history on file. Social History     Socioeconomic History   ? Marital status:      Spouse name: Soto Rivera   ? Number of children: Not on file   ? Years of education: Not on file   ? Highest education level: Not on file   Occupational History   ? Occupation: Unemployed and lives with girlfriend   Social Needs   ? Financial resource strain: Not on file   ? Food insecurity     Worry: Not on file     Inability: Not on file   ? Transportation needs     Medical: Not on file     Non-medical: Not on file   Tobacco Use   ? Smoking status: Never Smoker   ? Smokeless tobacco: Never Used   Substance and Sexual Activity   ? Alcohol use: Yes     Comment: very rare    ? Drug use: No   ? Sexual activity: Not on file   Lifestyle   ? Physical activity     Days per week: Not on file     Minutes per session: Not on file   ? Stress: Not on file   Relationships   ? Social  connections     Talks on phone: Not on file     Gets together: Not on file     Attends Muslim service: Not on file     Active member of club or organization: Not on file     Attends meetings of clubs or organizations: Not on file     Relationship status: Not on file   ? Intimate partner violence     Fear of current or ex partner: Not on file     Emotionally abused: Not on file     Physically abused: Not on file     Forced sexual activity: Not on file   Other Topics Concern   ? Not on file   Social History Narrative    The patient's son was present to provide the HPI.          Medications  Allergies   Current Outpatient Medications   Medication Sig Dispense Refill   ? allopurinol (ZYLOPRIM) 300 MG tablet Take 300 mg by mouth daily.     ? atorvastatin (LIPITOR) 80 MG tablet Take 1 tablet (80 mg total) by mouth daily. (Patient taking differently: Take 80 mg by mouth daily.       ) 90 tablet 3   ? cetirizine (ZYRTEC) 10 MG tablet Take 1 tablet by mouth daily.  0   ? cholecalciferol, vitamin D3, (VITAMIN D3) 5,000 unit Tab Take 5,000 Units by mouth Daily at 5 pm.     ? clopidogreL (PLAVIX) 75 mg tablet Take 1 tablet (75 mg total) by mouth daily. 90 tablet 3   ? diltiazem (CARDIZEM CD) 180 MG 24 hr capsule Take 1 capsule (180 mg total) by mouth daily. 90 capsule 2   ? fluticasone (VERAMYST) 27.5 mcg/actuation nasal spray 1 spray into each nostril at bedtime. Indications: inflammation of the nose due to an allergy     ? furosemide (LASIX) 20 MG tablet Take 1 tablet (20 mg total) by mouth daily. (Patient taking differently: Take 20 mg by mouth daily.       ) 90 tablet 3   ? hydrALAZINE (APRESOLINE) 25 MG tablet TAKE 1 PILL (25 MG TOTAL) BY MOUTH 3 TIMES EVERYDAY/ TXHUA HNUB NOJ 1 LUB TSHUAJ 3 ZAUG 270 tablet 0   ? levothyroxine (SYNTHROID) 88 MCG tablet Take 88 mcg by mouth Daily at 6:00 am. Indications: hypothyroidism     ? metoprolol succinate (TOPROL-XL) 100 MG 24 hr tablet Take 2 tablets (200 mg total) by mouth  daily. 270 tablet 3   ? nitroglycerin (NITROSTAT) 0.4 MG SL tablet Place 1 tablet (0.4 mg total) under the tongue every 5 (five) minutes as needed for chest pain. (Patient taking differently: Place 0.4 mg under the tongue every 5 (five) minutes as needed for chest pain.       ) 1 Bottle 3   ? rivaroxaban ANTICOAGULANT (XARELTO) 15 mg tablet Take 1 tablet (15 mg total) by mouth daily with supper. 90 tablet 3     No current facility-administered medications for this visit.     No Known Allergies      Lab Results    Chemistry/lipid CBC Cardiac Enzymes/BNP/TSH/INR   Lab Results   Component Value Date    CHOL 156 08/17/2018    HDL 43 08/17/2018    LDLCALC 73 08/17/2018    TRIG 201 (H) 08/17/2018    CREATININE 1.73 (H) 02/19/2020    BUN 27 (H) 02/19/2020    K 4.0 02/19/2020     02/19/2020     02/19/2020    CO2 28 02/19/2020    Lab Results   Component Value Date    WBC 9.0 02/19/2020    HGB 16.3 02/19/2020    HCT 48.4 02/19/2020    MCV 94 02/19/2020     02/19/2020    Lab Results   Component Value Date    CKTOTAL 57 02/16/2018    TROPONINI <0.01 05/16/2018     (H) 02/19/2020    TSH 0.39 08/17/2018    INR 1.47 (H) 11/02/2019        Parth Russo

## 2021-06-29 NOTE — PROGRESS NOTES
"Progress Notes by Ruben Sommers MD at 5/1/2020 11:10 AM     Author: Ruben Sommers MD Service: -- Author Type: Physician    Filed: 5/1/2020 11:43 AM Encounter Date: 5/1/2020 Status: Signed    : Ruben Sommers MD (Physician)           The patient has been notified of following:     \"This telephone visit will be conducted via a call between you and your physician/provider. We have found that certain health care needs can be provided without the need for a physical exam.  This service lets us provide the care you need with a phone conversation.  If a prescription is necessary we can send it directly to your pharmacy.  If lab work is needed we can place an order for that and you can then stop by our lab to have the test done at a later time. If during the course of the call the physician/provider feels a telephone visit is not appropriate, you will not be charged for this service.\" Verbal consent has been obtained for this service by care team member:         HEART CARE PHONE ENCOUNTER        The patient has chosen to have the visit conducted as a telephone visit, to reduce risk of exposure given the current status of Coronavirus in our community. This telephone visit is being conducted via a call between the patient and physician/provider. Health care needs are being provided without a physical exam.     Cardiac electrophysiology telephone encounter note:  Mr. Pennington was scheduled for an EP consultation today to discuss management of his persistent AF, ischemic cardiomyopathy, chronic systolic heart failure and to discuss his candidacy for upgrade of dual-chamber defibrillator to a cardiac resynchronization therapy system with possible implantation of a new left ventricular/coronary sinus leads versus a bundle of His/left bundle pacing lead.  He has remained in persistent AF and deemed  not candidate for reattempt to restore and maintain sinus rhythm.       I have reviewed his recent cardiac and imaging data " including chest x-ray, ICD interrogations, ECG, echocardiogram.  He he has remained in atrial fibrillation with 100% burden, right ventricular apical pacing estimated at 33% likely contributing to his LV systolic dysfunction and chronic systolic heart failure.    Based on my assessment of his condition and discussion with him and his family member regarding his symptoms, I confirmed that he meets indication for upgrading his ICD system to CRT as stated above, using, a coronary sinus lead or a left bundle/bundle of His pacing lead.   The need for AV junction ablation will be determined at the time of his device upgrade    Indications, benefits and potential risks of this procedure were discussed and reviewed with him with the assistance of his family in great details.  They understand the potential risks for complication including but not limited to device or bloodstream infection, pneumothorax, cardiac perforation, tamponade, airway complications, worsening of chronic static heart failure, remote risk of death.     Due to his multiple comorbidities he is at high risk for complications with moderate sedation therefore he meets indication for MAC anesthesia with Precedex.      Anticipate scheduling his procedure within the coming 3 to 4 weeks.      Encounter time 16 minutes.     Encounter diagnoses:   1. Chronic systolic congestive heart failure (H)     2. Permanent atrial fibrillation     3. Ischemic cardiomyopathy     4. Current use of long term anticoagulation     5. Stage 4 chronic kidney disease (H)     6. Dual ICD (implantable cardioverter-defibrillator) in place         Ruben Sommers MD

## 2021-06-29 NOTE — PROGRESS NOTES
Progress Notes by Parth Russo MD at 11/11/2020  2:10 PM     Author: Parth Russo MD Service: -- Author Type: Physician    Filed: 11/11/2020  3:29 PM Encounter Date: 11/11/2020 Status: Addendum    : Parth Russo MD (Physician)    Related Notes: Original Note by Parth Russo MD (Physician) filed at 11/11/2020  2:51 PM             [facility] Progress Note    Assessment:  1.  Coronary artery disease/ischemic cardiomyopathy.  Multivessel stenting April 2018 without complaints of angina.  No heart failure symptoms or findings on examination.  Most recent echocardiogram from July 2020 demonstrates improved to systolic function.  He continues on the current combination of medications.  He is on Xarelto for atrial fibrillation and Plavix for ALTE vessel coronary stenting.  Ultimately will need to decide whether Plavix can be discontinued but at this point recommend that we continue.  I did talk about the increased bleeding risk with this combination.  Has not had any bleeding issues or falling issues.    2.  Persistent atrial fibrillation.  Most recent device interrogation demonstrates reasonable trial of atrial fibrillation with the current combination of metoprolol and diltiazem.  Appreciate prior input from Dr. Silva reviewed.  In May he visited with Dr. Silva her the recommendation was that if there was no increasing symptoms of heart failure with consider ongoing medical management.  Creatinine clearance right at 51.  His creatinine was lower than previous.  Is right on the cusp of the dose of Xarelto.  We will need to continue to monitor closely.  3.  Dyslipidemia.  Cholesterol numbers from October 2020 finds a cholesterol of 127 with an LDL of 61 and normal liver function test.    4.  Hypertension.  Blood pressures appear to be under good control.    Plan: Follow-up in 4 to 5 months with continued current combination of medications.  I forwarded a note to Dr. Silva  regarding the most recent device interrogation.    1. Coronary artery disease involving native coronary artery of native heart without angina pectoris     2. Dilated cardiomyopathy (H)     3. Ischemic cardiomyopathy     4. ICD (implantable cardioverter-defibrillator) in place, dual chamber     5. Ventricular tachycardia (H)     6. Chronic kidney disease (CKD), stage III (moderate)     7. Dyslipidemia, goal LDL below 70     8. Permanent atrial fibrillation           An After Visit Summary was printed and given to the patient.    Subjective:    Dillon Pennington is a 65 y.o. male who returned for a planned  follow up visit.  He is seen with the help of the  on the telephone line.  He reports today that he has been feeling very well.  He denies chest pain, significant shortness of breath, orthopnea or PND.  He reports that he can walk 1/2 mile to 1 mile without getting short of breath although if he walks longer distances he might feel dyspneic.  He is not reporting dizziness or lightheadedness.  He reports that blood pressures have been under good control typically in the 120-130range over 80s.    He has a history of ischemic cardiomyopathy with an ejection fraction as low as 20% in 2018.  He underwent intervention April 2018 with a critical mid LAD stenosis, circumflex and high-grade diagonal stenosis with Impella support.  Ejection fraction has improved with the most recent echocardiogram being from July 2020 where ejection fraction is now mildly reduced to 53% and said to be significantly improved from June 2019.  I did review these findings with him today.    Review of Systems:   General: WNL  Eyes: WNL  Ears/Nose/Throat: WNL  Lungs: WNL  Heart: WNL  Stomach: WNL  Bladder: WNL  Muscle/Joints: WNL  Skin: WNL  Nervous System: WNL  Mental Health: WNL     Blood: WNL      Problem List:    Patient Active Problem List   Diagnosis   ? Chronic kidney disease (CKD), stage III (moderate)   ? Chronic systolic heart  failure (H)   ? Dilated cardiomyopathy (H)   ? Permanent atrial fibrillation   ? VF (ventricular fibrillation) (H)   ? Coronary artery disease involving native coronary artery of native heart without angina pectoris   ? Ventricular tachycardia (H)   ? Anemia   ? Dyslipidemia, goal LDL below 70   ? ICD (implantable cardioverter-defibrillator) in place, dual chamber   ? Cough   ? Food insecurity   ? Limitation of activities due to disability   ? Dyspnea   ? Ischemic cardiomyopathy       Social History     Socioeconomic History   ? Marital status:      Spouse name: Soto Rivera   ? Number of children: Not on file   ? Years of education: Not on file   ? Highest education level: Not on file   Occupational History   ? Occupation: Unemployed and lives with girlfriend   Social Needs   ? Financial resource strain: Not on file   ? Food insecurity     Worry: Not on file     Inability: Not on file   ? Transportation needs     Medical: Not on file     Non-medical: Not on file   Tobacco Use   ? Smoking status: Never Smoker   ? Smokeless tobacco: Never Used   Substance and Sexual Activity   ? Alcohol use: Yes     Comment: very rare    ? Drug use: No   ? Sexual activity: Not on file   Lifestyle   ? Physical activity     Days per week: Not on file     Minutes per session: Not on file   ? Stress: Not on file   Relationships   ? Social connections     Talks on phone: Not on file     Gets together: Not on file     Attends [employer] service: Not on file     Active member of club or organization: Not on file     Attends meetings of clubs or organizations: Not on file     Relationship status: Not on file   ? Intimate partner violence     Fear of current or ex partner: Not on file     Emotionally abused: Not on file     Physically abused: Not on file     Forced sexual activity: Not on file   Other Topics Concern   ? Not on file   Social History Narrative    The patient's son was present to provide the HPI.       No family history on  file.    Current Outpatient Medications   Medication Sig Dispense Refill   ? allopurinol (ZYLOPRIM) 300 MG tablet Take 300 mg by mouth daily.     ? atorvastatin (LIPITOR) 80 MG tablet Take 1 tablet (80 mg total) by mouth daily. (Patient taking differently: Take 80 mg by mouth daily.       ) 90 tablet 3   ? cetirizine (ZYRTEC) 10 MG tablet Take 1 tablet by mouth daily.  0   ? cholecalciferol, vitamin D3, (VITAMIN D3) 5,000 unit Tab Take 5,000 Units by mouth Daily at 5 pm.     ? clopidogreL (PLAVIX) 75 mg tablet Take 1 tablet (75 mg total) by mouth daily. 90 tablet 3   ? diltiazem (CARDIZEM CD) 180 MG 24 hr capsule Take 1 capsule (180 mg total) by mouth daily. 90 capsule 0   ? fluticasone (VERAMYST) 27.5 mcg/actuation nasal spray 1 spray into each nostril at bedtime. Indications: inflammation of the nose due to an allergy     ? furosemide (LASIX) 20 MG tablet Take 1 tablet (20 mg total) by mouth daily. (Patient taking differently: Take 20 mg by mouth daily.       ) 90 tablet 3   ? hydrALAZINE (APRESOLINE) 25 MG tablet TAKE 1 PILL (25 MG TOTAL) BY MOUTH 3 TIMES EVERYDAY/ TXHUA HNUB NOJ 1 LUB TSHUAJ 3 ZAUG 270 tablet 2   ? levothyroxine (SYNTHROID) 88 MCG tablet Take 88 mcg by mouth Daily at 6:00 am. Indications: hypothyroidism     ? metoprolol succinate (TOPROL-XL) 100 MG 24 hr tablet Take 2 tablets (200 mg total) by mouth daily. 270 tablet 0   ? nitroglycerin (NITROSTAT) 0.4 MG SL tablet Place 1 tablet (0.4 mg total) under the tongue every 5 (five) minutes as needed for chest pain. (Patient taking differently: Place 0.4 mg under the tongue every 5 (five) minutes as needed for chest pain.       ) 1 Bottle 3   ? rivaroxaban ANTICOAGULANT (XARELTO) 15 mg tablet Take 1 tablet (15 mg total) by mouth daily with supper. 90 tablet 3     No current facility-administered medications for this visit.        Objective:     /72 (Patient Site: Left Arm, Patient Position: Sitting, Cuff Size: Adult Regular)   Pulse 60   Resp  "16   Ht 5' 3\" (1.6 m)   Wt 172 lb (78 kg) Comment: With shoes.  SpO2 98%   BMI 30.47 kg/m    172 lb (78 kg)   [unfilled]  Wt Readings from Last 3 Encounters:   20 172 lb (78 kg)   20 174 lb (78.9 kg)   20 174 lb (78.9 kg)       Physical Exam:    GENERAL APPEARANCE: alert, no apparent distress  HEENT: no scleral icterus or xanthelasma  NECK: jugular venous pressure.  Within normal limits  CHEST: symmetric, the lungs are clear to auscultation, device site well-healed  CARDIOVASCULAR: Distant, irregular, soft systolic murmur; no carotid bruits  Abdomen: No Organomegaly, masses, bruits, or tenderness. Bowels sounds are present      EXTREMITIES: no cyanosis, clubbing or edema    Cardiac Testing:  Echo Complete  Order# [id number]  Reading physician: Mario Jordan DO Ordering physician: Parth Russo MD Study date: 20   Performing Date Performing Department   2020  CARDIAC TESTING [[id number]]   Patient Information    Patient Name   Dillon Pennington MRN   [id number] Sex   Male  1   1955 (65 y.o.)   Indications    Dx: Dyspnea [R06.00 (ICD-10-CM)]   Summary      Normal left ventricular size.The calculated left ventricular ejection fraction is 53%. This represents a mildly decreased ejection fraction.    The following segments are hypokinetic: apical inferior. All other segments are normal.    Left atrial volume is severely increased.    Mild tricuspid valve regurgitation. No pulmonary hypertension present. The estimated systolic pulmonary artery pressure is 19 mmhg.    Mild mitral regurgitation.    When compared to the previous study dated 2019, significant improvement in lleft ventricular function.        NM Pharmacologic Stress Test  Order# [id number]  Reading physician: Franci Morillo MD Ordering physician: Javon Carrington MD Study date: 18   Patient Information    Patient Name   Dillon Pennington MRN   [id number] Fabiano   Male  1   1955 (63 " y.o.)   EKG Scan     Stress Test Data - Scan on 4/16/18 2:32 PM by    Conclusion      The pharmacologic nuclear stress test is abnormal.    The left ventricular ejection fraction is 15% with global hypokinesis.    There is a small area of a moderate degree of nontransmural infarction in the apical segment(s) of the left ventricle.    There is no prior study available.        Javon Carrington MD Primary    Procedure Laterality Anesthesia   Coronary Angiogram N/A Conscious Sedation (RN)   Percutaneous Coronary Intervention N/A Conscious Sedation (RN)   Impella Insert N/A Conscious Sedation (RN)         Pre Procedure Diagnosis    cardimyopathy   Indications    Systolic congestive heart failure, unspecified congestive heart failure chronicity [I50.20 (ICD-10-CM)]    Procedure Status    Procedure scheduled as Urgent (Inpatient).         Conclusion         Estimated blood loss was <20 ml.    1st Diag lesion 85% stenosed.    A drug eluting stent was successfully placed.    Mid LAD lesion 99% stenosed.    A drug eluting stent was successfully placed.    Mid Cx lesion 90% stenosed.    A stent was successfully placed.    The LM vessel was moderate.     Pt with ischemic CM CKD with long discussion with hte family today re CABG vs PCI and family wished to have PCI     Procedure performed with Dr. Cheung and myself     Access:  Left radial for coronary access  Right femoral access with US preclose placed 14 F and impella insertion prior to PCI     Angiography:  LM min dz ostium  LAD severe dz bolivar glesion  Mid and in diagonal, far distal severe diffuse dz  Circ mid severe dz     PCI:  1. Deployed two DEJUAN to diagonal  2. Deployed two DEJUAN to mid LAD (long lesion), post IVUS confirmed aposition  3. Deployed DEJUAN to mid Circ     Removed impella with use perclose sutures with 3+ dorsalis pulse post     Imp/plan:  1. Ischemic CM - s/p PCI LAD/Circ with DEJUAN - asp/plavix, stop heparin, carvedilol, statin (titrate up carvedilol as  tolerated)  2. VT/VF - possible polymorphic VT from ischemic and/or low magnesium on amiodarone with preexisiting risk - discuss with EP re secondary prevention with ICD  3. Afib - in SR on amiodoane, cont for now iv over night then change to oral tomorrow.  4. MR - moderate - repeat echo   5. Severe CKD - 202mL iodixanol -cont hydration today,nephrology following         Type: Routine remote ICD transmission  Presenting: Atrial fibrillation with VS/ 60bpm  Lead/Battery Status: Stable  Atrial Arrhythmias: Since 7/28/2020, chronic AF (programmed VVIR).  Vent Arrhythmias: 13 NSVT and 15 other untreated detections. EGMs show AF RVRs. Overall histograms show >/=120bpm 10-15%.  Anticoagulant: Xarelto  Comments: Normal device function. BK        Lab Results:    Lab Results   Component Value Date     10/08/2020    K 3.9 10/08/2020     (H) 10/08/2020    CO2 22 10/08/2020    BUN 22 10/08/2020    CREATININE 1.59 (H) 10/08/2020    CALCIUM 9.0 10/08/2020     Lab Results   Component Value Date    CHOL 127 10/08/2020    TRIG 139 10/08/2020    HDL 38 (L) 10/08/2020     BNP (pg/mL)   Date Value   02/19/2020 164 (H)   05/16/2018 81 (H)   04/17/2018 335 (H)     Creatinine (mg/dL)   Date Value   10/08/2020 1.59 (H)   02/19/2020 1.73 (H)   08/17/2018 1.78 (H)   05/22/2018 2.09 (H)     LDL Calculated (mg/dL)   Date Value   10/08/2020 61   08/17/2018 73   04/14/2018 129     Lab Results   Component Value Date    WBC 9.0 02/19/2020    HGB 16.3 02/19/2020    HCT 48.4 02/19/2020    MCV 94 02/19/2020     02/19/2020         This note has been dictated using voice recognition software. Any grammatical or context distortions are unintentional and inherent to the software.

## 2021-06-29 NOTE — PROGRESS NOTES
Progress Notes by Rachael Rubalcava CNP at 4/15/2020  3:30 PM     Author: Rachael Rubalcava CNP Service: -- Author Type: Nurse Practitioner    Filed: 4/15/2020  5:21 PM Encounter Date: 4/15/2020 Status: Signed    : Rachael Rubalcava CNP (Nurse Practitioner)            Thank you, Dr. Farah, for asking the [facility] team to see Mr. Dillon Pennington to evaluate permanent atrial fibrillation and ischemic CM with systolic heart failure.    Assessment/Recommendations     Assessment/Plan:    Diagnoses and all orders for this visit:    Permanent atrial fibrillation and discussed with Peter that I do not recommend a trial of sinus with amiodarone as very low chance of success.  He has atrial fib with RVR and is pacing 33% of the time in an RV chamber.  Both these conditions could be making his systolic heart failure worse.  I explained that I discussed case with Dr. Russo and Dr. Gramajo and recommend upgrade of his device to CRT-D from ICD with AV node ablation.  I discussed procedure.  I discussed anticipate could be up to 4 hours and procedure.  Discussed 1% risk of infection and would anticipate he would have some bleeding in pocket and around puncture site given that he is on anticoagulation and clopidogrel.  I discussed placement of a new device.  I discussed postop restrictions.  Questions answered.  His main concerns are not regarding the procedure but rather social issues and see below.  He tells me he does not have insurance but that is not what the chart shows.  I discussed with Peter that we will try to move ahead with the procedure and work on social issues at the same time.  He would need limited echo Dr. Russo ordered prior to procedure as this has not been done with COVID restrictions.    Chronic systolic heart failure (H) and on low-dose diuretic with chronic kidney disease stage III and  last creatinine 1.7.  He is not on ACE or ARB and rather on hydralazine.  May want to  reevaluate meds post procedure.    ICD (implantable cardioverter-defibrillator) in place, dual chamber and device functioning appropriately.    VF (ventricular fibrillation) (H) and Ventricular tachycardia (H)history.  No true ventricular arrhythmias on device check  Food insecurity and limitation of activities due to cardiac disability- patient expressed concern that he may become homeless.  Also tearful discussing that he has not worked for a long time.  He is trying to file for unemployment but may truly better be served by filing for disability.  Due to being age 65 now he may fall between the rules.  We definitely need help from . I gave him a note saying he will not be able to work for at least 6 months.  I also discussed that I would be willing to medically disable him as I do not believe that he will be able to work even if device is helpful in improving heart function.  He has severe heart disease.  I have sent a note to his primary physician to see if he has access to a  who could help.  I have also discussed these issues with his primary cardiologist Dr. Russo.    [id number] score of 3 and on renal dose Xarelto which is appropriate.  Follow up in clinic as planned post procedure.     History of Present Illness/Subjective     Dillon Pennington is a very pleasant 65 y.o. male who comes in today for EP consult regarding permanent atrial fibrillation.  Dillon Pennington has a known history of myocardial infarction in 2018 and diagnosed with severe three-vessel coronary artery disease which was stented at that time.  He also had VT and VF at the time.  He received an ICD.  He has ischemic cardiomyopathy and now has been in A. fib for well over a year.  He has atrial fibrillation with RVR at times but no palpitations.  He has chronic heart failure with reduced ejection fraction and chronic kidney disease stage III.  Peter tells me that he is not able to work because he cannot lift any significant  weight.  It is hard for him to walk more than a block and very fatigued.  He denies PND or peripheral edema.  He takes his meds consistently.  I believe at home nurse sets up his meds and he takes them on the schedule.  He is independent in caring for himself.  He is living with his girlfriend.  He is well groomed.  He understands very little English.  I did this visit as a history and physical for procedure and on doing social history discovered that he is concerned about becoming homeless and not working for a long time.  See above for more details.  I was not able to complete family history as very tearful and kept discussing social concerns. I was not able to move past these issues.  This visit was done with the [employer]  on the phone.    Cardiographics (reviewed):  Results for orders placed during the hospital encounter of 06/12/19   Echo Complete [ECH10] 06/12/2019    Narrative   When compared to the previous study dated 7/11/2018, no significant   change.    Left ventricle ejection fraction is moderately decreased. The calculated   left ventricular ejection fraction is 33%.    Normal left ventricular size.    Normal right ventricular size and systolic function.    Mild to moderate mitral regurgitation    Device lead in right heart chambers        Results for orders placed during the hospital encounter of 04/12/18   Cardiac Catheterization [CATH01] 04/20/2018    Addendum    impella insertion  prior to PCI    Angiography: LM min dz ostium LAD severe dz bolivar glesion  Mid and in diagonal, far distal severe diffuse  dz Circ mid severe dz  PCI: 1. Deployed two DEJUAN to diagonal 2. Deployed two DEJUAN to mid LAD (long lesion), post IVUS confirmed  aposition 3. Deployed DEJUAN to mid Circ  Removed impella with use perclose sutures with 3+ dorsalis pulse post  Imp/plan: 1. Ischemic CM - s/p PCI LAD/Circ with DEJUAN - asp/plavix, stop heparin,  carvedilol, statin (titrate up carvedilol as tolerated) 2. VT/VF - possible  polymorphic VT from ischemic and/or low magnesium on  amiodarone with preexisiting risk - discuss with EP re secondary  prevention with ICD 3. Afib - in SR on amiodoane, cont for now iv over night then change to  oral tomorrow. 4. MR - moderate - repeat echo  5. Severe CKD - 202mL iodixanol -cont hydration today,nephrology following         Javon Carrington MD 4/20/2018  1:25 PM          Narrative   Estimated blood loss was <20 ml.    1st Diag lesion 85% stenosed.    A drug eluting stent was successfully placed.    Mid LAD lesion 99% stenosed.    A drug eluting stent was successfully placed.    Mid Cx lesion 90% stenosed.    A stent was successfully placed.    The LM vessel was moderate.        Results for orders placed during the hospital encounter of 04/12/18   NM Pharmacologic Stress Test    Narrative   The pharmacologic nuclear stress test is abnormal.    The left ventricular ejection fraction is 15% with global hypokinesis.    There is a small area of a moderate degree of nontransmural infarction   in the apical segment(s) of the left ventricle.    There is no prior study available.             Cardiac testing personally reviewed:  Device check shows leads stable and battery ok.  Device functioning appropriately.   Atrial pacing none and Ventricular xcxatl33%.  AT/AFib burden 100% and VR greater than or equal to 15%. No true ventr arrhythmiasas 31 NSVT recorded but EGMs suggest Afib with RVR.    Results for orders placed or performed in visit on 02/19/20   ECG Clinic - Today   Result Value Ref Range    SYSTOLIC BLOOD PRESSURE      DIASTOLIC BLOOD PRESSURE      VENTRICULAR RATE 78 BPM    ATRIAL RATE 159 BPM    P-R INTERVAL      QRS DURATION 86 ms    Q-T INTERVAL 394 ms    QTC CALCULATION (BEZET) 449 ms    P Axis      R AXIS 3 degrees    T AXIS 21 degrees    MUSE DIAGNOSIS       Atrial fibrillation  Abnormal ECG  When compared with ECG of 17-AUG-2018 16:03,  Atrial fibrillation has replaced Electronic  atrial pacemaker  Nonspecific T wave abnormality no longer evident in Anterior leads  Confirmed by COLETTE BETTENCOURT MD LOC:JN ([id number]) on 2/20/2020 2:00:21 PM            Problem List:  Patient Active Problem List   Diagnosis   ? Chronic kidney disease (CKD), stage III (moderate) (H)   ? Chronic systolic heart failure (H)   ? Dilated cardiomyopathy (H)   ? Permanent atrial fibrillation   ? VF (ventricular fibrillation) (H)   ? Coronary artery disease involving native coronary artery of native heart without angina pectoris   ? Ventricular tachycardia (H)   ? Anemia   ? Dyslipidemia, goal LDL below 70   ? ICD (implantable cardioverter-defibrillator) in place, dual chamber   ? Cough     Revi  e  Physical Examination Review of Systems   w [facility]  Vitals:    04/15/20 1445   BP: 100/80   Pulse: 64   Resp: 16     Body mass index is 30.65 kg/m .  Wt Readings from Last 3 Encounters:   04/15/20 173 lb (78.5 kg)   02/19/20 172 lb 12.8 oz (78.4 kg)   02/19/20 169 lb (76.7 kg)     General Appearance:   Alert, well-appearing and in no acute distress.   HEENT: Atraumatic, normocephalic.  No scleral icterus, normal conjunctivae; mucous membranes pink and moist.     Chest: Chest symmetric, spine straight.   Lungs:   Respirations unlabored: Lungs are clear to auscultation.   Cardiovascular:   Normal first and second heart sounds with no murmurs, rubs, or gallops.  Irregular, irregular.  Radial and posterior tibial pulses are intact.  Normal JVD, no edema.       Extremities: No cyanosis or clubbing   Musculoskeletal: Moves all extremities   Skin: Warm, dry, intact.    Neurologic: Mood and affect are appropriate, alert and oriented to person, place, time, and situation    General: WNL  Eyes: WNL  Ears/Nose/Throat: WNL  Lungs: WNL  Heart: WNL  Stomach: WNL  Bladder: WNL  Muscle/Joints: WNL  Skin: WNL  Nervous System: WNL  Mental Health: WNL     Blood: WNL       Medical History  Surgical History Family History Social History     Past Medical  History:   Diagnosis Date   ? DMITRI (acute kidney injury) (H)    ? CKD (chronic kidney disease)    ? Dyslipidemia, goal LDL below 70 4/16/2018   ? Mushroom poisoning 9/11/2006   ? Nonischemic cardiomyopathy (H)    ? Paroxysmal atrial fibrillation (H)    ? Persistent atrial fibrillation 4/12/2018    Past Surgical History:   Procedure Laterality Date   ? CHOLECYSTECTOMY     ? CV CORONARY ANGIOGRAM N/A 4/18/2018    Procedure: Coronary Angiogram;  Surgeon: Heriberto Cheung MD;  Location: [facility];  Service:    ? CV CORONARY ANGIOGRAM N/A 4/20/2018    Procedure: Coronary Angiogram;  Surgeon: Javon Carrington MD;  Location: [facility];  Service:    ? CV IMPELLA INSERT N/A 4/20/2018    Procedure: Impella Insert;  Surgeon: Javon Carrington MD;  Location: [facility];  Service:    ? CV LEFT HEART CATHETERIZATION WO LEFT VETRICULOGRAM Left 4/18/2018    Procedure: Left Heart Catheterization Without Left Ventriculogram;  Surgeon: Heriberto Cheung MD;  Location: [facility];  Service:    ? EP ICD INSERT N/A 4/23/2018    Procedure: EP ICD Insert;  Surgeon: Rakesh Rivera MD;  Location: [facility];  Service:    ? PICC  4/18/2018         History reviewed. No pertinent family history. Social History     Tobacco Use   Smoking Status Never Smoker   Smokeless Tobacco Never Used     Social History     Substance and Sexual Activity   Alcohol Use Yes    Comment: very rare         Medications  Allergies     Current Outpatient Medications   Medication Sig Dispense Refill   ? allopurinol (ZYLOPRIM) 300 MG tablet Take 300 mg by mouth daily.     ? atorvastatin (LIPITOR) 80 MG tablet Take 1 tablet (80 mg total) by mouth daily. (Patient taking differently: Take 80 mg by mouth daily.       ) 90 tablet 3   ? cetirizine (ZYRTEC) 10 MG tablet Take 1 tablet by mouth daily.  0   ? cholecalciferol, vitamin D3, (VITAMIN D3) 5,000 unit Tab Take 5,000 Units by mouth Daily at 5 pm.     ?  clopidogrel (PLAVIX) 75 mg tablet Take 1 tablet (75 mg total) by mouth daily. (Patient taking differently: Take 75 mg by mouth daily.       ) 90 tablet 3   ? diltiazem (CARDIZEM CD) 180 MG 24 hr capsule Take 1 capsule (180 mg total) by mouth daily. 30 capsule 12   ? furosemide (LASIX) 20 MG tablet Take 1 tablet (20 mg total) by mouth daily. (Patient taking differently: Take 20 mg by mouth daily.       ) 90 tablet 3   ? hydrALAZINE (APRESOLINE) 25 MG tablet TAKE 1 PILL (25 MG TOTAL) BY MOUTH 3 TIMES EVERYDAY/ TXHUA HNUB NOJ 1 LUB TSHUAJ 3 ZAUG 270 tablet 0   ? levothyroxine (SYNTHROID) 88 MCG tablet Take 88 mcg by mouth Daily at 6:00 am. Indications: hypothyroidism     ? metoprolol succinate (TOPROL-XL) 100 MG 24 hr tablet Take 2 tablets (200 mg total) by mouth daily. 270 tablet 3   ? nitroglycerin (NITROSTAT) 0.4 MG SL tablet Place 1 tablet (0.4 mg total) under the tongue every 5 (five) minutes as needed for chest pain. (Patient taking differently: Place 0.4 mg under the tongue every 5 (five) minutes as needed for chest pain.       ) 1 Bottle 3   ? rivaroxaban ANTICOAGULANT (XARELTO) 15 mg tablet Take 1 tablet (15 mg total) by mouth daily with supper. 90 tablet 3   ? fluticasone (VERAMYST) 27.5 mcg/actuation nasal spray 1 spray into each nostril at bedtime. Indications: inflammation of the nose due to an allergy       No current facility-administered medications for this visit.       No Known Allergies   Medical, surgical, family, social history, and medications were all reviewed and updated as necessary.   Lab Results    Chemistry/lipid CBC Cardiac Enzymes/BNP/TSH/INR     Lab Results   Component Value Date    CREATININE 1.73 (H) 02/19/2020    BUN 27 (H) 02/19/2020     02/19/2020    K 4.0 02/19/2020     02/19/2020    CO2 28 02/19/2020     Creatinine (mg/dL)   Date Value   02/19/2020 1.73 (H)   08/17/2018 1.78 (H)   05/22/2018 2.09 (H)   05/16/2018 2.02 (H)     Lab Results   Component Value Date    BNP  164 (H) 02/19/2020    Lab Results   Component Value Date    WBC 9.0 02/19/2020    HGB 16.3 02/19/2020    HCT 48.4 02/19/2020    MCV 94 02/19/2020     02/19/2020     Lab Results   Component Value Date    INR 1.47 (H) 11/02/2019      Lab Results   Component Value Date    CHOL 156 08/17/2018    HDL 43 08/17/2018    LDLCALC 73 08/17/2018    TRIG 201 (H) 08/17/2018          Total Time- 60 minutes with greater than 50% spent talking to patient and family regarding patient's relevant diagnoses.  This note has been dictated using voice recognition software. Any grammatical, typographical, or context distortions are unintentional and inherent to the software.

## 2021-06-29 NOTE — PROGRESS NOTES
"Progress Notes by Marion Blunt CNP at 4/29/2020 12:50 PM     Author: Marion Blunt CNP Service: -- Author Type: Nurse Practitioner    Filed: 4/29/2020  2:12 PM Encounter Date: 4/29/2020 Status: Signed    : Marion Blunt CNP (Nurse Practitioner)           The patient has been notified of following:     \"This telephone visit will be conducted via a call between you and your physician/provider. We have found that certain health care needs can be provided without the need for a physical exam.  This service lets us provide the care you need with a phone conversation.  If a prescription is necessary we can send it directly to your pharmacy.  If lab work is needed we can place an order for that and you can then stop by our lab to have the test done at a later time. If during the course of the call the physician/provider feels a telephone visit is not appropriate, you will not be charged for this service.\" Verbal consent has been obtained for this service by care team member:         HEART CARE PHONE ENCOUNTER        The patient has chosen to have the visit conducted as a telephone visit, to reduce risk of exposure given the current status of Coronavirus in our community. This telephone visit is being conducted via a call between the patient and physician/provider. Health care needs are being provided without a physical exam.     Assessment/Recommendations   Assessment:    1.  Ischemic cardiomyopathy with systolic dysfunction: He states he has fatigue and dyspnea on exertion.  Overall he states he feels well.  He denies orthopnea or PND.  He denies edema.  His weight has remained stable.    2.  Hypertension: Controlled.  Blood pressure today at home 122/80    3.  Persistent atrial fibrillation: His heart rates have been difficult to control.  Discussion of possible AV node ablation.  He is currently on Xarelto for anticoagulation.  He is also on diltiazem and Toprol for rate control.    4.  " Coronary artery disease: Denies any chest pain.  He continues Lipitor, clopidogrel and aspirin.  Status post PCI to mid LAD, first diagonal and mid circumflex in April 2018.    Plan:  1.  Continue current medications  2.  Continue daily weights and low-sodium diet  3.  Encouraged regular exercise  4.  Discussed importance of staying home due to coronavirus      Follow Up Plan: Follow up with Dr. Russo in June, Dr. Sommers May 1 and in the heart failure clinic in August  I have reviewed the note as documented.  This accurately captures the substance of my conversation with the patient.    Total time of call between patient and provider was 25 minutes   Start Time: 1255  Stop Time: 1320       History of Present Illness/Subjective    Dillon Pennington is a 65 y.o. male who is being evaluated via a billable telephone visit.  An  is present for the duration of the appointment.  He has a past medical history significant for ischemic cardiomyopathy with systolic dysfunction, hypertension, permanent atrial fibrillation, chronic kidney disease stage III, VT and VF, dyslipidemia, CAD.  Status post ICD.  Status post PCI to mid LAD, first diagonal and mid circumflex April 2018.  Her most recent echocardiogram was done June 2019 which showed a ejection fraction 33%.    Today he states he is doing well.  He has mild fatigue and dyspnea on exertion.  He denies orthopnea, PND or chest pain.  He denies edema.  He denies palpitations.    His weight has remained stable around 172 pounds.  He follows a low-sodium diet.  It is difficult for him to exercise due to knee pain.      I have reviewed and updated the patient's Past Medical History, Social History, Family History and Medication List.     Physical Examination not performed given phone encounter Review of Systems                                                Medical History  Surgical History Family History Social History   Past Medical History:   Diagnosis Date   ? DMITRI  (acute kidney injury) (H)    ? CKD (chronic kidney disease)    ? Dyslipidemia, goal LDL below 70 4/16/2018   ? Mushroom poisoning 9/11/2006   ? Nonischemic cardiomyopathy (H)    ? Paroxysmal atrial fibrillation (H)    ? Persistent atrial fibrillation 4/12/2018    Past Surgical History:   Procedure Laterality Date   ? CHOLECYSTECTOMY     ? CV CORONARY ANGIOGRAM N/A 4/18/2018    Procedure: Coronary Angiogram;  Surgeon: Heriberto Cheung MD;  Location: [facility];  Service:    ? CV CORONARY ANGIOGRAM N/A 4/20/2018    Procedure: Coronary Angiogram;  Surgeon: Javon Carrington MD;  Location: [facility];  Service:    ? CV IMPELLA INSERT N/A 4/20/2018    Procedure: Impella Insert;  Surgeon: Javon Carrington MD;  Location: [facility];  Service:    ? CV LEFT HEART CATHETERIZATION WO LEFT VETRICULOGRAM Left 4/18/2018    Procedure: Left Heart Catheterization Without Left Ventriculogram;  Surgeon: Heriberto Cheung MD;  Location: [facility];  Service:    ? EP ICD INSERT N/A 4/23/2018    Procedure: EP ICD Insert;  Surgeon: Rakesh Rivera MD;  Location: [facility];  Service:    ? PICC  4/18/2018         No family history on file. Social History     Socioeconomic History   ? Marital status:      Spouse name: Soto Rivera   ? Number of children: Not on file   ? Years of education: Not on file   ? Highest education level: Not on file   Occupational History   ? Occupation: Unemployed and lives with girlfriend   Social Needs   ? Financial resource strain: Not on file   ? Food insecurity     Worry: Not on file     Inability: Not on file   ? Transportation needs     Medical: Not on file     Non-medical: Not on file   Tobacco Use   ? Smoking status: Never Smoker   ? Smokeless tobacco: Never Used   Substance and Sexual Activity   ? Alcohol use: Yes     Comment: very rare    ? Drug use: No   ? Sexual activity: Not on file   Lifestyle   ? Physical activity     Days per  week: Not on file     Minutes per session: Not on file   ? Stress: Not on file   Relationships   ? Social connections     Talks on phone: Not on file     Gets together: Not on file     Attends [employer] service: Not on file     Active member of club or organization: Not on file     Attends meetings of clubs or organizations: Not on file     Relationship status: Not on file   ? Intimate partner violence     Fear of current or ex partner: Not on file     Emotionally abused: Not on file     Physically abused: Not on file     Forced sexual activity: Not on file   Other Topics Concern   ? Not on file   Social History Narrative    The patient's son was present to provide the HPI.          Medications  Allergies   Current Outpatient Medications   Medication Sig Dispense Refill   ? allopurinol (ZYLOPRIM) 300 MG tablet Take 300 mg by mouth daily.     ? atorvastatin (LIPITOR) 80 MG tablet Take 1 tablet (80 mg total) by mouth daily. (Patient taking differently: Take 80 mg by mouth daily.       ) 90 tablet 3   ? cetirizine (ZYRTEC) 10 MG tablet Take 1 tablet by mouth daily.  0   ? cholecalciferol, vitamin D3, (VITAMIN D3) 5,000 unit Tab Take 5,000 Units by mouth Daily at 5 pm.     ? clopidogreL (PLAVIX) 75 mg tablet Take 1 tablet (75 mg total) by mouth daily. 90 tablet 3   ? diltiazem (CARDIZEM CD) 180 MG 24 hr capsule Take 1 capsule (180 mg total) by mouth daily. 30 capsule 12   ? fluticasone (VERAMYST) 27.5 mcg/actuation nasal spray 1 spray into each nostril at bedtime. Indications: inflammation of the nose due to an allergy     ? furosemide (LASIX) 20 MG tablet Take 1 tablet (20 mg total) by mouth daily. (Patient taking differently: Take 20 mg by mouth daily.       ) 90 tablet 3   ? hydrALAZINE (APRESOLINE) 25 MG tablet TAKE 1 PILL (25 MG TOTAL) BY MOUTH 3 TIMES EVERYDAY/ TXHUA HNUB NOJ 1 LUB TSHUAJ 3 ZAUG 270 tablet 0   ? levothyroxine (SYNTHROID) 88 MCG tablet Take 88 mcg by mouth Daily at 6:00 am. Indications:  hypothyroidism     ? metoprolol succinate (TOPROL-XL) 100 MG 24 hr tablet Take 2 tablets (200 mg total) by mouth daily. 270 tablet 3   ? rivaroxaban ANTICOAGULANT (XARELTO) 15 mg tablet Take 1 tablet (15 mg total) by mouth daily with supper. 90 tablet 3   ? nitroglycerin (NITROSTAT) 0.4 MG SL tablet Place 1 tablet (0.4 mg total) under the tongue every 5 (five) minutes as needed for chest pain. (Patient taking differently: Place 0.4 mg under the tongue every 5 (five) minutes as needed for chest pain.       ) 1 Bottle 3     No current facility-administered medications for this visit.     No Known Allergies      Lab Results    Chemistry/lipid CBC Cardiac Enzymes/BNP/TSH/INR   Lab Results   Component Value Date    CHOL 156 08/17/2018    HDL 43 08/17/2018    LDLCALC 73 08/17/2018    TRIG 201 (H) 08/17/2018    CREATININE 1.73 (H) 02/19/2020    BUN 27 (H) 02/19/2020    K 4.0 02/19/2020     02/19/2020     02/19/2020    CO2 28 02/19/2020    Lab Results   Component Value Date    WBC 9.0 02/19/2020    HGB 16.3 02/19/2020    HCT 48.4 02/19/2020    MCV 94 02/19/2020     02/19/2020    Lab Results   Component Value Date    CKTOTAL 57 02/16/2018    TROPONINI <0.01 05/16/2018     (H) 02/19/2020    TSH 0.39 08/17/2018    INR 1.47 (H) 11/02/2019        Marion Blunt

## 2021-06-30 NOTE — PROGRESS NOTES
"Progress Notes by Rakesh Rivera MD at 12/9/2020  4:10 PM     Author: Rakesh Rivera MD Service: -- Author Type: Physician    Filed: 12/9/2020  4:24 PM Encounter Date: 12/9/2020 Status: Signed    : Rakesh Rivera MD (Physician)           The patient has been notified of following:     \"This telephone visit will be conducted via a call between you and your physician/provider. We have found that certain health care needs can be provided without the need for a physical exam.  This service lets us provide the care you need with a phone conversation.  If a prescription is necessary we can send it directly to your pharmacy.  If lab work is needed we can place an order for that and you can then stop by our lab to have the test done at a later time. If during the course of the call the physician/provider feels a telephone visit is not appropriate, you will not be charged for this service.\" Verbal consent has been obtained for this service by care team member:         HEART CARE PHONE ENCOUNTER        The patient has chosen to have the visit conducted as a telephone visit, to reduce risk of exposure given the current status of Coronavirus in our community. This telephone visit is being conducted via a call between the patient and physician/provider. Health care needs are being provided without a physical exam.     Assessment/Recommendations   Assessment:    :  Recurrent episodes of ventricular fibrillation  Long QT  Dual-chamber ICD implanted April 24, 2018  Advanced ischemic cardiomyopathy  Echocardiogram July 11, 2018 showed improvement in ejection fraction; earlier 20% now 30-35% there is also less mitral regurgitation   atrial fibrillation with elevated ventricular response-chronic atrial fibrillation  Chronic anticoagulation with Xarelto 15 mg; dose reduced because of renal insufficiency  Chronic renal failure; creatinine 1.78  Hyperlipidemia well-controlled on high-dose atorvastatin with recent " cholesterol 156, LDL 73  Severe coronary artery disease with intervention 4-; critical mid LAD 99%; mid circumflex 90%; high-grade diagonal (Impella supported) PCI to 3 vessels        Plan:  1.  ICD interrogation shows battery good for 9 years 6 months  Chronic atrial fibrillation no atrial pacing  37% ventricular pacing would indicate good ventricular rate control  Remains on Xarelto 15 mg daily  Comprehensive blood work October 8, 2020; creatinine 1.59  Electrolytes normal  Liver panel normal  Creatinine=1.59  Total cholesterol 127 with HDL 38, LDL 61  Echocardiogram July 27, 2020 ejection fraction was 53%; there was some hypokinesis of the anterior apical region  Some anterior apical hypokinesia; compared to June 12, 2019 there is substantial improvement left ventricular function  Recent EKG showed atrial fibrillation with narrow QRS controlled ventricular response      Follow Up Plan: Follow up in   I have reviewed the note as documented.  This accurately captures the substance of my conversation with the patient.    Total time of call between patient and provider was  5  minutes   Patient did not answer phone or return call at designated time  I discussed with his son   Start Time:1600  Stop Time:1605  20 minutes spent reviewing medical records and generting medical report       History of Present Illness/Subjective    Dillon Pennington is a 65 y.o. male who is being evaluated via a billable telephone visit.    ICD evaluation is excellent  Chronic atrial fibrillation but no episodes of ventricular tachycardia no therapy has been necessary  Echocardiogram shows substantial improvement in ejection fraction.  On July 21, 2020 ejection fraction of 53% his pulmonary pressure was estimated to be normal  He did have comprehensive blood work October 8, 2020  Creatinine 1.59  Electrolytes normal  Liver panel normal    Medications were reviewed    I have reviewed and updated the patient's Past Medical History,  Social History, Family History and Medication List.     Physical Examination not performed given phone encounter Review of Systems                                                Medical History  Surgical History Family History Social History   Past Medical History:   Diagnosis Date   ? DMITRI (acute kidney injury) (H)    ? CKD (chronic kidney disease)    ? Dyslipidemia, goal LDL below 70 4/16/2018   ? Mushroom poisoning 9/11/2006   ? Nonischemic cardiomyopathy (H)    ? Paroxysmal atrial fibrillation (H)    ? Persistent atrial fibrillation (H) 4/12/2018    Past Surgical History:   Procedure Laterality Date   ? CHOLECYSTECTOMY     ? CV CORONARY ANGIOGRAM N/A 4/18/2018    Procedure: Coronary Angiogram;  Surgeon: Heriberto Cheung MD;  Location: [facility];  Service:    ? CV CORONARY ANGIOGRAM N/A 4/20/2018    Procedure: Coronary Angiogram;  Surgeon: Javon Carrington MD;  Location: [facility];  Service:    ? CV IMPELLA INSERT N/A 4/20/2018    Procedure: Impella Insert;  Surgeon: Javon Carrington MD;  Location: [facility];  Service:    ? CV LEFT HEART CATHETERIZATION WO LEFT VETRICULOGRAM Left 4/18/2018    Procedure: Left Heart Catheterization Without Left Ventriculogram;  Surgeon: Heriberto Cheung MD;  Location: [facility];  Service:    ? EP ICD INSERT N/A 4/23/2018    Procedure: EP ICD Insert;  Surgeon: Rakesh Rivera MD;  Location: [facility] Lab;  Service:    ? PICC  4/18/2018         No family history on file. Social History     Socioeconomic History   ? Marital status:      Spouse name: Soto Rivera   ? Number of children: Not on file   ? Years of education: Not on file   ? Highest education level: Not on file   Occupational History   ? Occupation: Unemployed and lives with girlfriend   Social Needs   ? Financial resource strain: Not on file   ? Food insecurity     Worry: Not on file     Inability: Not on file   ? Transportation needs     Medical: Not  on file     Non-medical: Not on file   Tobacco Use   ? Smoking status: Never Smoker   ? Smokeless tobacco: Never Used   Substance and Sexual Activity   ? Alcohol use: Yes     Comment: very rare    ? Drug use: No   ? Sexual activity: Not on file   Lifestyle   ? Physical activity     Days per week: Not on file     Minutes per session: Not on file   ? Stress: Not on file   Relationships   ? Social connections     Talks on phone: Not on file     Gets together: Not on file     Attends Sabianism service: Not on file     Active member of club or organization: Not on file     Attends meetings of clubs or organizations: Not on file     Relationship status: Not on file   ? Intimate partner violence     Fear of current or ex partner: Not on file     Emotionally abused: Not on file     Physically abused: Not on file     Forced sexual activity: Not on file   Other Topics Concern   ? Not on file   Social History Narrative    The patient's son was present to provide the HPI.          Medications  Allergies   Current Outpatient Medications   Medication Sig Dispense Refill   ? allopurinol (ZYLOPRIM) 300 MG tablet Take 300 mg by mouth daily.     ? atorvastatin (LIPITOR) 80 MG tablet Take 1 tablet (80 mg total) by mouth daily. (Patient taking differently: Take 80 mg by mouth daily.       ) 90 tablet 3   ? cetirizine (ZYRTEC) 10 MG tablet Take 1 tablet by mouth daily.  0   ? cholecalciferol, vitamin D3, (VITAMIN D3) 5,000 unit Tab Take 5,000 Units by mouth Daily at 5 pm.     ? clopidogreL (PLAVIX) 75 mg tablet Take 1 tablet (75 mg total) by mouth daily. 90 tablet 3   ? diltiazem (CARDIZEM CD) 180 MG 24 hr capsule Take 1 capsule (180 mg total) by mouth daily. 90 capsule 0   ? fluticasone (VERAMYST) 27.5 mcg/actuation nasal spray 1 spray into each nostril at bedtime. Indications: inflammation of the nose due to an allergy     ? furosemide (LASIX) 20 MG tablet Take 1 tablet (20 mg total) by mouth daily. (Patient taking differently: Take  20 mg by mouth daily.       ) 90 tablet 3   ? hydrALAZINE (APRESOLINE) 25 MG tablet TAKE 1 PILL (25 MG TOTAL) BY MOUTH 3 TIMES EVERYDAY/ TXHUA HNUB NOJ 1 LUB TSHUAJ 3 ZAUG 270 tablet 2   ? levothyroxine (SYNTHROID) 88 MCG tablet Take 88 mcg by mouth Daily at 6:00 am. Indications: hypothyroidism     ? metoprolol succinate (TOPROL-XL) 100 MG 24 hr tablet TAKE 2 TABLETS (200 MG TOTAL) BY MOUTH DAILY/ TXHUA HNUB NOJ 2 LUB TSHUAJ PAB ZOO NTSHAV SIAB 180 tablet 1   ? nitroglycerin (NITROSTAT) 0.4 MG SL tablet Place 1 tablet (0.4 mg total) under the tongue every 5 (five) minutes as needed for chest pain. (Patient taking differently: Place 0.4 mg under the tongue every 5 (five) minutes as needed for chest pain.       ) 1 Bottle 3   ? rivaroxaban ANTICOAGULANT (XARELTO) 15 mg tablet Take 1 tablet (15 mg total) by mouth daily with supper. 90 tablet 3     No current facility-administered medications for this visit.     No Known Allergies      Lab Results    Chemistry/lipid CBC Cardiac Enzymes/BNP/TSH/INR   Lab Results   Component Value Date    CHOL 127 10/08/2020    HDL 38 (L) 10/08/2020    LDLCALC 61 10/08/2020    TRIG 139 10/08/2020    CREATININE 1.59 (H) 10/08/2020    BUN 22 10/08/2020    K 3.9 10/08/2020     10/08/2020     (H) 10/08/2020    CO2 22 10/08/2020    Lab Results   Component Value Date    WBC 9.0 02/19/2020    HGB 16.3 02/19/2020    HCT 48.4 02/19/2020    MCV 94 02/19/2020     02/19/2020    Lab Results   Component Value Date    CKTOTAL 57 02/16/2018    TROPONINI <0.01 05/16/2018     (H) 02/19/2020    TSH 0.39 08/17/2018    INR 1.47 (H) 11/02/2019        Rakesh Rivera

## 2021-06-30 NOTE — PROGRESS NOTES
Progress Notes by Rakesh Rivera MD at 11/24/2020 12:18 PM     Author: Rakesh Rivera MD Service: -- Author Type: Physician    Filed: 11/24/2020 12:19 PM Encounter Date: 11/24/2020 Status: Signed    : Rakesh Rivera MD (Physician)       Message  Received: 1 week ago  Message Contents   Parth Russo MD Granrud, Gregory A, MD             [facility]    Mr miranda appears to be doing well.  He is feeling well and has no findings of heart failure on exam.  Ejection fraction looked improved from July 2020.  His device continues to show some nonsustained ventricular tachycardia and some occurrences of faster heart rate in A. fib.  Just wanted to get your input but clinically doing very well.Alberto      Echocardiogram July 21, 2020 showed ejection fraction 53%  EKG shows A. fib with narrow QRS and controlled ventricular response  Has a dual -chamber ICD  Agree with ongoing medical therapy see no need for upgrade of device

## 2021-06-30 NOTE — PROGRESS NOTES
Progress Notes by Khadar Vaz CNP at 1/29/2021 11:00 AM     Author: Khadar Vaz CNP Service: -- Author Type: Nurse Practitioner    Filed: 1/29/2021 12:02 PM Encounter Date: 1/29/2021 Status: Signed    : Khadar Vaz CNP (Nurse Practitioner)       Chief Complaint   Patient presents with   ? Ear Pain     Rt ear, took tylenol yesterday for pain, used qtip to dig in ear and must of scratched ear, having bleeding in Rt ear, no fever       HPI:  Dillon Pennington is a 65 y.o. male with ischemic cardiopathy on Plavix, CAD, CHF, and stage II CKD history, who presents today complaining of RIGHT ear bleeding following Qtip use yesterday. Report accidently scratching ear canal and noticed bleeding that continued for several hours and then spontaneously resolved. He has take tylenol OTC with some relief. Reports mild muffled hearing as well. Denies any known ill contacts or other cold symptoms.     Of note, BP elevated today, patient reports not taking his BP medication prior to this visit.     History obtained from the patient and son providing interpretation.      Problem List:  2020-07: Dyspnea  2020-07: Ischemic cardiomyopathy  2020-04: Food insecurity  2020-04: Limitation of activities due to disability  2020-02: Cough  2018-04: ICD (implantable cardioverter-defibrillator) in place, dual   chamber  2018-04: Anemia  2018-04: Dyslipidemia, goal LDL below 70  2018-04: Chronic systolic heart failure (H)  2018-04: Cardiogenic shock (H)  2018-04: Pulmonary edema  2018-04: Permanent atrial fibrillation  2018-04: Coronary artery disease involving native coronary artery of   native heart without angina pectoris  2018-03: Chronic kidney disease (CKD), stage III (moderate)  2018-02: Systolic congestive heart failure (H)  2007-02: Abdominal pain  Acute on chronic systolic congestive heart failure (H)  DMITRI (acute kidney injury) (H)  Cough  Dilated cardiomyopathy (H)  Acute renal failure superimposed on stage 3  chronic kidney disease,   unspecified acute renal failure type  VF (ventricular fibrillation) (H)  Ventricular tachycardia (H)      Past Medical History:   Diagnosis Date   ? DMITRI (acute kidney injury) (H)    ? CKD (chronic kidney disease)    ? Dyslipidemia, goal LDL below 70 4/16/2018   ? Mushroom poisoning 9/11/2006   ? Nonischemic cardiomyopathy (H)    ? Paroxysmal atrial fibrillation (H)    ? Persistent atrial fibrillation (H) 4/12/2018       Social History     Tobacco Use   ? Smoking status: Never Smoker   ? Smokeless tobacco: Never Used   Substance Use Topics   ? Alcohol use: Yes     Comment: very rare        Review of Systems   Constitutional: Negative for activity change, appetite change, chills, fatigue and fever.   HENT: Positive for ear discharge and ear pain. Negative for congestion and sore throat.    Respiratory: Negative for cough.    Gastrointestinal: Positive for vomiting. Negative for diarrhea.   Genitourinary: Negative for dysuria.   Musculoskeletal: Negative for myalgias.   Neurological: Negative for headaches.       Vitals:    01/29/21 1101 01/29/21 1151   BP: (!) 146/108 125/86   Patient Site:  Right Arm   Patient Position:  Sitting   Cuff Size:  Adult Regular   Pulse: 74 66   Resp: 12    Temp: 97.7  F (36.5  C)    TempSrc: Oral    SpO2: 98%        Physical Exam  Constitutional:       Appearance: Normal appearance. He is not ill-appearing or diaphoretic.   HENT:      Head: Normocephalic and atraumatic.      Left Ear: Ear canal and external ear normal.      Ears:      Comments: RIGHT TM with noted perforation and stable clot in place, canal with minor erythremic excoriation. Mild edema and tenderness. No active bleeding.     LEFT TM with serous fluid, bubbles, landmarks noted, no erythema or edema.      Nose: Rhinorrhea present.      Mouth/Throat:      Mouth: Mucous membranes are moist.      Pharynx: Posterior oropharyngeal erythema present. No oropharyngeal exudate.   Neck:      Musculoskeletal:  Neck supple.   Cardiovascular:      Rate and Rhythm: Regular rhythm.      Pulses: Normal pulses.      Heart sounds: Normal heart sounds.   Pulmonary:      Effort: Pulmonary effort is normal.      Breath sounds: Normal breath sounds.   Lymphadenopathy:      Cervical: No cervical adenopathy.   Skin:     General: Skin is warm.      Capillary Refill: Capillary refill takes less than 2 seconds.      Coloration: Skin is not pale.      Findings: No erythema or rash.   Neurological:      Mental Status: He is alert and oriented to person, place, and time.   Psychiatric:         Mood and Affect: Mood normal.         Behavior: Behavior normal.         No notes on file    Labs:  No results found for this or any previous visit (from the past 72 hour(s)).    Radiology:None obtained     Clinical Decision Making: At the end of the encounter, I discussed results, diagnosis, medications. Discussed with patient need for treatment course with TM perforation. Advised the need to avoid any objects in ear canal, no water and monitor for return of bleeding while on Plavix.     Of note, discussed elevated BP and need to maintain BP, patient agreed and will take medications today. Reviewed indications for follow up if no improvement. Patient understood and agreed to plan.     AXEL Espinosa, CNP       1. Perforation of right tympanic membrane  amoxicillin (AMOXIL) 500 MG tablet   2. Essential hypertension, malignant     3. Long term current use of anticoagulant therapy           Patient Instructions     Patient Education     Ruptured Eardrum, Traumatic    The eardrum is a thin membrane about 1 inch from the opening of the ear canal. It is easily injured by objects put into the ear canal. It may also be injured by a loud noise close to the ear or a slap to the ear. A ruptured eardrum will cause pain. There may be some clear or bloody drainage. A buzzing sound may be heard in the ear. Some hearing may be lost the affected ear.  The goal  is to keep the ear dry and clean until the eardrum heals. Antibiotics may be prescribed if infection is present. Follow-up with ear and hearing specialists is advised. In many cases, hearing returns to normal after the eardrum heals.  Home care    Keep a cotton ball in the ear to keep it clean and protect the inner ear.    Do not use eardrops unless prescribed by the healthcare provider.    Do not get water in your ear when showering or bathing. No swimming until approved by the healthcare provider.    Take any prescription or over-the-counter medicines as advised.  Follow-up care  Follow up with specialists for test or exams as directed. A hearing test should be done soon after the injury. Also follow up within 2 weeks to check healing of the eardrum.  When to seek medical advice  Fever in children:    Child is 3 months old or younger and has a fever of 100.4 F (38 C) or higher. (Get medical care right away. Fever in a young baby can be a sign of a dangerous infection.)    Child is younger than 2 years of age and has a fever of 100.4 F (38 C) that continues for more than 1 day.    Child is 2 years old or older and has a fever of 100.4 F (38 C) that continues for more than 3 days.    Child is of any age and has repeated fevers above 104 F (40 C).  Fever in adults:    Fever of 100.4 F (38 C)  Also call for any of the following:    Fluid drainage from the ear for longer than 24 hours    Increasing ear pain    Worsening headache or dizziness    Worsening hearing loss  Date Last Reviewed: 10/1/2017    0548-5493 [employer]. [address] [place] [address], [place] [address]. All rights reserved. This information is not intended as a substitute for professional medical care. Always follow your healthcare professional's instructions.            '

## 2021-06-30 NOTE — PROGRESS NOTES
Progress Notes by Parth Russo MD at 4/26/2021  2:50 PM     Author: Parth Russo MD Service: -- Author Type: Physician    Filed: 4/26/2021  3:37 PM Encounter Date: 4/26/2021 Status: Signed    : Parth Russo MD (Physician)             [facility] Progress Note    Assessment:  1.  Coronary artery disease with ischemic cardiomyopathy.  Multivessel stenting as outlined April 2018 with significant improvement in ejection fraction.  Ejection fraction 20% in 2018 and most recently July 2020 53%.  No complaints of angina or overt congestive heart failure.  We will continue to monitor for symptoms.  He has remained on Plavix with his diffuse coronary artery disease on Xarelto without bleeding difficulty.  Talk about the increased risk of bleeding.    2.  Persistent atrial fibrillation.  Most recent device interrogation finds atrial fibrillation with controlled ventricular response, 5 nonsustained ventricular tachycardic events.  He is on 15 mg of Xarelto.  Most recent creatinine his primary care physician's office was 1.53 which would bring his creatinine clearance to 53.  He is right on the borderline and potentially could benefit from 20 mg of Xarelto with have elected to keep it at 15 mg and monitor renal function.    3.  Hypertension.  Blood pressure appears to be under good control with the current combination of medications.  No complaints of dizziness.    4.  Dyslipidemia.  Most recent lipids are from 2/2020 where the total cholesterol was 127, HDL 38 LDL 61 on 80 mg of atorvastatin.            Plan: Outlined above with follow-up in 4 to 5 months.    1. Permanent atrial fibrillation     2. Ischemic cardiomyopathy     3. ICD (implantable cardioverter-defibrillator) in place, dual chamber     4. Dyslipidemia, goal LDL below 70     5. Chronic systolic heart failure (H)           An After Visit Summary was printed and given to the patient.    Subjective:    Dillon Pennington is a 66  y.o. male who returned for a planned  follow up visit.He has a history of ischemic cardiomyopathy with an ejection fraction as low as 20% in 2018.  He underwent intervention April 2018 with a critical mid LAD stenosis, circumflex and high-grade diagonal stenosis with Impella support.  Ejection fraction has improved with the most recent echocardiogram being from July 2020 where ejection fraction is now mildly reduced to 53% and said to be significantly improved from June 2019.    He is seen with the help of the .  Reports no symptoms of chest discomfort, orthopnea, dizziness or lightheadedness.  He has mild breathlessness if he walks briskly but otherwise feels as if he has been doing quite well.  We went through medication regimen and he is taking medications as prescribed.  He does have a home health nurse who comes and helps set up his medications.    Review of Systems:                                              Problem List:    Patient Active Problem List   Diagnosis   ? Chronic kidney disease (CKD), stage III (moderate)   ? Chronic systolic heart failure (H)   ? Dilated cardiomyopathy (H)   ? Permanent atrial fibrillation   ? VF (ventricular fibrillation) (H)   ? Coronary artery disease involving native coronary artery of native heart without angina pectoris   ? Ventricular tachycardia (H)   ? Anemia   ? Dyslipidemia, goal LDL below 70   ? ICD (implantable cardioverter-defibrillator) in place, dual chamber   ? Cough   ? Food insecurity   ? Limitation of activities due to disability   ? Dyspnea   ? Ischemic cardiomyopathy       Social History     Socioeconomic History   ? Marital status:      Spouse name: Soto Rivera   ? Number of children: Not on file   ? Years of education: Not on file   ? Highest education level: Not on file   Occupational History   ? Occupation: Unemployed and lives with girlfriend   Social Needs   ? Financial resource strain: Not on file   ? Food insecurity     Worry: Not on  file     Inability: Not on file   ? Transportation needs     Medical: Not on file     Non-medical: Not on file   Tobacco Use   ? Smoking status: Never Smoker   ? Smokeless tobacco: Never Used   Substance and Sexual Activity   ? Alcohol use: Yes     Comment: very rare    ? Drug use: No   ? Sexual activity: Not on file   Lifestyle   ? Physical activity     Days per week: Not on file     Minutes per session: Not on file   ? Stress: Not on file   Relationships   ? Social connections     Talks on phone: Not on file     Gets together: Not on file     Attends [employer] service: Not on file     Active member of club or organization: Not on file     Attends meetings of clubs or organizations: Not on file     Relationship status: Not on file   ? Intimate partner violence     Fear of current or ex partner: Not on file     Emotionally abused: Not on file     Physically abused: Not on file     Forced sexual activity: Not on file   Other Topics Concern   ? Not on file   Social History Narrative    The patient's son was present to provide the HPI.       No family history on file.    Current Outpatient Medications   Medication Sig Dispense Refill   ? acetaminophen (TYLENOL) 325 MG tablet Take 650 mg by mouth every 6 (six) hours as needed for pain.     ? allopurinol (ZYLOPRIM) 300 MG tablet Take 300 mg by mouth daily.     ? atorvastatin (LIPITOR) 80 MG tablet Take 1 tablet (80 mg total) by mouth daily. 90 tablet 0   ? cetirizine (ZYRTEC) 10 MG tablet Take 1 tablet by mouth daily.  0   ? cholecalciferol, vitamin D3, (VITAMIN D3) 5,000 unit Tab Take 5,000 Units by mouth Daily at 5 pm.     ? clopidogreL (PLAVIX) 75 mg tablet TAKE 1 PILL (75 MG TOTAL) BY MOUTH DAILY /TXANNAA HNUB NOJ 1 LUB TSHUAJ PAB KOM NTSHAV STEFFANIE 90 tablet 2   ? diltiazem (CARDIZEM CD) 180 MG 24 hr capsule Take 1 capsule (180 mg total) by mouth daily. 90 capsule 0   ? fluticasone (VERAMYST) 27.5 mcg/actuation nasal spray 1 spray into each nostril at bedtime.  Indications: inflammation of the nose due to an allergy     ? furosemide (LASIX) 20 MG tablet Take 1 tablet (20 mg total) by mouth daily. 90 tablet 0   ? hydrALAZINE (APRESOLINE) 25 MG tablet TAKE 1 PILL (25 MG TOTAL) BY MOUTH 3 TIMES EVERYDAY/ TXHUA HNUB NOJ 1 LUB TSHUAJ 3 ZAUG 270 tablet 2   ? levothyroxine (SYNTHROID) 88 MCG tablet Take 88 mcg by mouth Daily at 6:00 am. Indications: hypothyroidism     ? metoprolol succinate (TOPROL-XL) 100 MG 24 hr tablet TAKE 2 TABLETS (200 MG TOTAL) BY MOUTH DAILY/ TXHUA HNUB NOJ 2 LUB TSHUAJ PAB ZOO NTSHAV SIAB 180 tablet 1   ? nitroglycerin (NITROSTAT) 0.4 MG SL tablet Place 1 tablet (0.4 mg total) under the tongue every 5 (five) minutes as needed for chest pain. (Patient taking differently: Place 0.4 mg under the tongue every 5 (five) minutes as needed for chest pain.       ) 1 Bottle 3   ? XARELTO 15 mg tablet TAKE 1 TABLET (15 MG TOTAL) BY MOUTH DAILY WITH SUPPER. 90 tablet 1     No current facility-administered medications for this visit.        Objective:     /70 (Patient Site: Right Arm, Patient Position: Sitting, Cuff Size: Adult Regular)   Pulse 69   Resp 16   Wt 170 lb (77.1 kg)   SpO2 98%   BMI 30.11 kg/m    170 lb (77.1 kg)   [unfilled]  Wt Readings from Last 3 Encounters:   04/26/21 170 lb (77.1 kg)   11/11/20 172 lb (78 kg)   07/21/20 174 lb (78.9 kg)       Physical Exam:    GENERAL APPEARANCE: alert, no apparent distress  HEENT: no scleral icterus or xanthelasma  NECK: jugular venous pressure within normal limits  CHEST: symmetric, the lungs are clear to auscultation, device well-healed  CARDIOVASCULAR: regular rhythm without significant murmur, click, or gallop; no carotid bruits  Abdomen: No Organomegaly, masses, bruits, or tenderness. Bowels sounds are present      EXTREMITIES: no cyanosis, clubbing or edema  Neuro: Alert and oriented.    Cardiac Testing:     Reviewed By    Rakesh Rivera MD on 7/27/2020 09:09   Parth Russo MD on  2020 20:27   Echo Complete  Order# [id number]  Reading physician: Mario Jordan DO Ordering physician: Parth Russo MD Study date: 20   Performing Date Performing Department   2020  CARDIAC TESTING [[id number]]   Patient Information    Patient Name   Dillon Pennington MRN   [id number] Fabiano   Male  [birth year]   1955 (65 y.o.)   Indications    Dx: Dyspnea [R06.00 (ICD-10-CM)]   Summary      Normal left ventricular size.The calculated left ventricular ejection fraction is 53%. This represents a mildly decreased ejection fraction.    The following segments are hypokinetic: apical inferior. All other segments are normal.    Left atrial volume is severely increased.    Mild tricuspid valve regurgitation. No pulmonary hypertension present. The estimated systolic pulmonary artery pressure is 19 mmhg.    Mild mitral regurgitation.    When compared to the previous study dated 2019, significant improvement in lleft ventricular function.     NM Pharmacologic Stress Test  Order# [id number]  Reading physician: Franci Morillo MD Ordering physician: Javon Carrington MD Study date: 18   Patient Information    Patient Name   Dillon Pennington MRN   [id number] Sex   Male  1   1955 (63 y.o.)   EKG Scan     Stress Test Data - Scan on 18 2:32 PM by    Conclusion      The pharmacologic nuclear stress test is abnormal.    The left ventricular ejection fraction is 15% with global hypokinesis.    There is a small area of a moderate degree of nontransmural infarction in the apical segment(s) of the left ventricle.    There is no prior study available.        Patient Information    Patient Name   Dillon Pennington MRN   [id number] Fabiano   Male  1   1955 (63 y.o.)   Physicians    Panel Physicians Referring Physician Case Authorizing Physician   Javon Carrington MD (Primary) Javon Carrington MD Panetta, Carmelo James, MD    PCP    Primary Care Provider   Gomez Farah MD   Phone:  [phone]      Procedures    Coronary Angiogram   Impella Insert   Percutaneous Coronary Intervention   Panel Information    Panel 1    Provider Role   Javon Carrington MD Primary    Procedure Laterality Anesthesia   Coronary Angiogram N/A Conscious Sedation (RN)   Percutaneous Coronary Intervention N/A Conscious Sedation (RN)   Impella Insert N/A Conscious Sedation (RN)         Pre Procedure Diagnosis    cardimyopathy   Indications    Systolic congestive heart failure, unspecified congestive heart failure chronicity [I50.20 (ICD-10-CM)]    Procedure Status    Procedure scheduled as Urgent (Inpatient).         Conclusion         Estimated blood loss was <20 ml.    1st Diag lesion 85% stenosed.    A drug eluting stent was successfully placed.    Mid LAD lesion 99% stenosed.    A drug eluting stent was successfully placed.    Mid Cx lesion 90% stenosed.    A stent was successfully placed.    The LM vessel was moderate.     Pt with ischemic CM CKD with long discussion with hte family today re CABG vs PCI and family wished to have PCI     Procedure performed with Dr. Cheung and myself     Access:  Left radial for coronary access  Right femoral access with US preclose placed 14 F and impella insertion prior to PCI     Angiography:  LM min dz ostium  LAD severe dz bolivar glesion  Mid and in diagonal, far distal severe diffuse dz  Circ mid severe dz     PCI:  1. Deployed two DEJUAN to diagonal  2. Deployed two DEJUAN to mid LAD (long lesion), post IVUS confirmed aposition  3. Deployed DEJUAN to mid Circ     Removed impella with use perclose sutures with 3+ dorsalis pulse post     Imp/plan:  1. Ischemic CM - s/p PCI LAD/Circ with DEJUAN - asp/plavix, stop heparin, carvedilol, statin (titrate up carvedilol as tolerated)  2. VT/VF - possible polymorphic VT from ischemic and/or low magnesium on amiodarone with preexisiting risk - discuss with EP re secondary prevention with ICD  3. Afib - in SR on amiodoane, cont for now iv over  night then change to oral tomorrow.  4. MR - moderate - repeat echo   5. Severe CKD - 202mL iodixanol -cont hydration today,nephrology following            Lab Results:    Lab Results   Component Value Date     10/08/2020    K 3.9 10/08/2020     (H) 10/08/2020    CO2 22 10/08/2020    BUN 22 10/08/2020    CREATININE 1.59 (H) 10/08/2020    CALCIUM 9.0 10/08/2020     Lab Results   Component Value Date    CHOL 127 10/08/2020    TRIG 139 10/08/2020    HDL 38 (L) 10/08/2020     BNP (pg/mL)   Date Value   02/19/2020 164 (H)   05/16/2018 81 (H)   04/17/2018 335 (H)     Creatinine (mg/dL)   Date Value   10/08/2020 1.59 (H)   02/19/2020 1.73 (H)   08/17/2018 1.78 (H)   05/22/2018 2.09 (H)     LDL Calculated (mg/dL)   Date Value   10/08/2020 61   08/17/2018 73   04/14/2018 129     Lab Results   Component Value Date    WBC 9.0 02/19/2020    HGB 16.3 02/19/2020    HCT 48.4 02/19/2020    MCV 94 02/19/2020     02/19/2020         This note has been dictated using voice recognition software. Any grammatical or context distortions are unintentional and inherent to the software.

## 2021-07-03 NOTE — ADDENDUM NOTE
Addendum Note by Beverly Murphy RN at 2/20/2020  1:36 PM     Author: Beverly Murphy RN Service: -- Author Type: Registered Nurse    Filed: 2/20/2020  2:16 PM Encounter Date: 2/20/2020 Status: Signed    : Beverly Murphy RN (Registered Nurse)    Addended by: BEVERLY MURPHY on: 2/20/2020 02:16 PM        Modules accepted: Orders

## 2021-07-03 NOTE — ADDENDUM NOTE
Addendum Note by Demetrio Srivastava MD at 4/12/2018  4:31 PM     Author: Demetrio Srivastava MD Service: -- Author Type: Physician    Filed: 4/12/2018  4:31 PM Encounter Date: 4/12/2018 Status: Signed    : Demetrio Srivastava MD (Physician)    Addended by: DEMETRIO SRIVASTAVA on: 4/12/2018 04:31 PM        Modules accepted: Orders

## 2021-07-14 PROBLEM — I50.20 SYSTOLIC CONGESTIVE HEART FAILURE (H): Status: RESOLVED | Noted: 2018-02-16 | Resolved: 2018-04-23

## 2021-07-14 PROBLEM — R57.0 CARDIOGENIC SHOCK (H): Status: RESOLVED | Noted: 2018-04-13 | Resolved: 2018-04-23

## 2021-07-20 ENCOUNTER — LAB REQUISITION (OUTPATIENT)
Dept: LAB | Facility: CLINIC | Age: 66
End: 2021-07-20

## 2021-07-20 DIAGNOSIS — E78.2 MIXED HYPERLIPIDEMIA: ICD-10-CM

## 2021-07-20 DIAGNOSIS — Z12.5 ENCOUNTER FOR SCREENING FOR MALIGNANT NEOPLASM OF PROSTATE: ICD-10-CM

## 2021-07-20 DIAGNOSIS — I10 ESSENTIAL (PRIMARY) HYPERTENSION: ICD-10-CM

## 2021-07-20 DIAGNOSIS — E03.9 HYPOTHYROIDISM, UNSPECIFIED: ICD-10-CM

## 2021-07-20 LAB
ALBUMIN SERPL-MCNC: 3.6 G/DL (ref 3.5–5)
ALP SERPL-CCNC: 135 U/L (ref 45–120)
ALT SERPL W P-5'-P-CCNC: 12 U/L (ref 0–45)
ANION GAP SERPL CALCULATED.3IONS-SCNC: 12 MMOL/L (ref 5–18)
AST SERPL W P-5'-P-CCNC: 21 U/L (ref 0–40)
BILIRUB SERPL-MCNC: 1 MG/DL (ref 0–1)
BUN SERPL-MCNC: 27 MG/DL (ref 8–22)
CALCIUM SERPL-MCNC: 9.6 MG/DL (ref 8.5–10.5)
CHLORIDE BLD-SCNC: 105 MMOL/L (ref 98–107)
CHOLEST SERPL-MCNC: 167 MG/DL
CO2 SERPL-SCNC: 23 MMOL/L (ref 22–31)
CREAT SERPL-MCNC: 1.59 MG/DL (ref 0.7–1.3)
GFR SERPL CREATININE-BSD FRML MDRD: 45 ML/MIN/1.73M2
GLUCOSE BLD-MCNC: 77 MG/DL (ref 70–125)
HDLC SERPL-MCNC: 36 MG/DL
LDLC SERPL CALC-MCNC: ABNORMAL MG/DL
POTASSIUM BLD-SCNC: 3.7 MMOL/L (ref 3.5–5)
PROT SERPL-MCNC: 7.4 G/DL (ref 6–8)
SODIUM SERPL-SCNC: 140 MMOL/L (ref 136–145)
TRIGL SERPL-MCNC: 553 MG/DL

## 2021-07-20 PROCEDURE — 84443 ASSAY THYROID STIM HORMONE: CPT | Performed by: NURSE PRACTITIONER

## 2021-07-20 PROCEDURE — 83721 ASSAY OF BLOOD LIPOPROTEIN: CPT | Performed by: NURSE PRACTITIONER

## 2021-07-20 PROCEDURE — 80061 LIPID PANEL: CPT | Performed by: NURSE PRACTITIONER

## 2021-07-20 PROCEDURE — 84155 ASSAY OF PROTEIN SERUM: CPT | Performed by: NURSE PRACTITIONER

## 2021-07-20 PROCEDURE — G0103 PSA SCREENING: HCPCS | Performed by: NURSE PRACTITIONER

## 2021-07-21 LAB
LDLC SERPL CALC-MCNC: 75 MG/DL
PSA SERPL-MCNC: 0.9 UG/L (ref 0–4.5)
TSH SERPL DL<=0.005 MIU/L-ACNC: 1.69 UIU/ML (ref 0.3–5)

## 2021-07-30 ENCOUNTER — LAB REQUISITION (OUTPATIENT)
Dept: LAB | Facility: CLINIC | Age: 66
End: 2021-07-30

## 2021-07-30 DIAGNOSIS — N18.30 CHRONIC KIDNEY DISEASE, STAGE 3 UNSPECIFIED (H): ICD-10-CM

## 2021-07-30 LAB
ALBUMIN SERPL-MCNC: 3.6 G/DL (ref 3.5–5)
ALP SERPL-CCNC: 126 U/L (ref 45–120)
ALT SERPL W P-5'-P-CCNC: 17 U/L (ref 0–45)
ANION GAP SERPL CALCULATED.3IONS-SCNC: 13 MMOL/L (ref 5–18)
AST SERPL W P-5'-P-CCNC: 24 U/L (ref 0–40)
BILIRUB SERPL-MCNC: 0.9 MG/DL (ref 0–1)
BUN SERPL-MCNC: 35 MG/DL (ref 8–22)
CALCIUM SERPL-MCNC: 9.7 MG/DL (ref 8.5–10.5)
CHLORIDE BLD-SCNC: 106 MMOL/L (ref 98–107)
CO2 SERPL-SCNC: 23 MMOL/L (ref 22–31)
CREAT SERPL-MCNC: 1.74 MG/DL (ref 0.7–1.3)
GFR SERPL CREATININE-BSD FRML MDRD: 40 ML/MIN/1.73M2
GLUCOSE BLD-MCNC: 114 MG/DL (ref 70–125)
POTASSIUM BLD-SCNC: 3.5 MMOL/L (ref 3.5–5)
PROT SERPL-MCNC: 7.4 G/DL (ref 6–8)
SODIUM SERPL-SCNC: 142 MMOL/L (ref 136–145)

## 2021-07-30 PROCEDURE — 80053 COMPREHEN METABOLIC PANEL: CPT | Performed by: NURSE PRACTITIONER

## 2021-07-30 PROCEDURE — 36415 COLL VENOUS BLD VENIPUNCTURE: CPT | Performed by: NURSE PRACTITIONER

## 2021-12-02 ENCOUNTER — TRANSCRIBE ORDERS (OUTPATIENT)
Dept: CARDIOLOGY | Facility: CLINIC | Age: 66
End: 2021-12-02
Payer: COMMERCIAL

## 2021-12-02 DIAGNOSIS — Z95.810 ICD (IMPLANTABLE CARDIOVERTER-DEFIBRILLATOR) IN PLACE: Primary | ICD-10-CM

## 2021-12-13 ENCOUNTER — OFFICE VISIT (OUTPATIENT)
Dept: CARDIOLOGY | Facility: CLINIC | Age: 66
End: 2021-12-13
Attending: INTERNAL MEDICINE
Payer: COMMERCIAL

## 2021-12-13 VITALS
HEART RATE: 64 BPM | HEIGHT: 64 IN | WEIGHT: 170 LBS | RESPIRATION RATE: 16 BRPM | BODY MASS INDEX: 29.02 KG/M2 | DIASTOLIC BLOOD PRESSURE: 80 MMHG | SYSTOLIC BLOOD PRESSURE: 110 MMHG

## 2021-12-13 DIAGNOSIS — I49.01 VF (VENTRICULAR FIBRILLATION) (H): ICD-10-CM

## 2021-12-13 DIAGNOSIS — R05.9 COUGH: ICD-10-CM

## 2021-12-13 DIAGNOSIS — N18.31 STAGE 3A CHRONIC KIDNEY DISEASE (H): Chronic | ICD-10-CM

## 2021-12-13 DIAGNOSIS — I49.01 VF (VENTRICULAR FIBRILLATION) (H): Primary | ICD-10-CM

## 2021-12-13 DIAGNOSIS — Z95.810 ICD (IMPLANTABLE CARDIOVERTER-DEFIBRILLATOR) IN PLACE: ICD-10-CM

## 2021-12-13 DIAGNOSIS — I25.10 CORONARY ARTERY DISEASE INVOLVING NATIVE CORONARY ARTERY OF NATIVE HEART WITHOUT ANGINA PECTORIS: Chronic | ICD-10-CM

## 2021-12-13 DIAGNOSIS — I42.0 DILATED CARDIOMYOPATHY (H): Chronic | ICD-10-CM

## 2021-12-13 DIAGNOSIS — I48.21 PERMANENT ATRIAL FIBRILLATION (H): Primary | ICD-10-CM

## 2021-12-13 LAB
MDC_IDC_EPISODE_DTM: NORMAL
MDC_IDC_EPISODE_ID: NORMAL
MDC_IDC_EPISODE_TYPE: NORMAL
MDC_IDC_LEAD_IMPLANT_DT: NORMAL
MDC_IDC_LEAD_IMPLANT_DT: NORMAL
MDC_IDC_LEAD_LOCATION: NORMAL
MDC_IDC_LEAD_LOCATION: NORMAL
MDC_IDC_LEAD_LOCATION_DETAIL_1: NORMAL
MDC_IDC_LEAD_LOCATION_DETAIL_1: NORMAL
MDC_IDC_LEAD_MFG: NORMAL
MDC_IDC_LEAD_MFG: NORMAL
MDC_IDC_LEAD_MODEL: NORMAL
MDC_IDC_LEAD_MODEL: NORMAL
MDC_IDC_LEAD_POLARITY_TYPE: NORMAL
MDC_IDC_LEAD_POLARITY_TYPE: NORMAL
MDC_IDC_LEAD_SERIAL: NORMAL
MDC_IDC_LEAD_SERIAL: NORMAL
MDC_IDC_MSMT_BATTERY_DTM: NORMAL
MDC_IDC_MSMT_BATTERY_REMAINING_LONGEVITY: 102 MO
MDC_IDC_MSMT_BATTERY_REMAINING_PERCENTAGE: 100 %
MDC_IDC_MSMT_BATTERY_STATUS: NORMAL
MDC_IDC_MSMT_CAP_CHARGE_DTM: NORMAL
MDC_IDC_MSMT_CAP_CHARGE_TIME: 10.7 S
MDC_IDC_MSMT_CAP_CHARGE_TYPE: NORMAL
MDC_IDC_MSMT_LEADCHNL_RA_IMPEDANCE_VALUE: 691 OHM
MDC_IDC_MSMT_LEADCHNL_RA_LEAD_CHANNEL_STATUS: NORMAL
MDC_IDC_MSMT_LEADCHNL_RV_IMPEDANCE_VALUE: 751 OHM
MDC_IDC_MSMT_LEADCHNL_RV_PACING_THRESHOLD_AMPLITUDE: 0.7 V
MDC_IDC_MSMT_LEADCHNL_RV_PACING_THRESHOLD_PULSEWIDTH: 0.4 MS
MDC_IDC_PG_IMPLANT_DTM: NORMAL
MDC_IDC_PG_MFG: NORMAL
MDC_IDC_PG_MODEL: NORMAL
MDC_IDC_PG_SERIAL: NORMAL
MDC_IDC_PG_TYPE: NORMAL
MDC_IDC_SESS_CLINIC_NAME: NORMAL
MDC_IDC_SESS_DTM: NORMAL
MDC_IDC_SESS_TYPE: NORMAL
MDC_IDC_SET_BRADY_LOWRATE: 60 {BEATS}/MIN
MDC_IDC_SET_BRADY_MAX_SENSOR_RATE: 120 {BEATS}/MIN
MDC_IDC_SET_BRADY_MODE: NORMAL
MDC_IDC_SET_LEADCHNL_RA_PACING_POLARITY: NORMAL
MDC_IDC_SET_LEADCHNL_RA_SENSING_ADAPTATION_MODE: NORMAL
MDC_IDC_SET_LEADCHNL_RA_SENSING_POLARITY: NORMAL
MDC_IDC_SET_LEADCHNL_RA_SENSING_SENSITIVITY: 0.15 MV
MDC_IDC_SET_LEADCHNL_RV_PACING_AMPLITUDE: 1.5 V
MDC_IDC_SET_LEADCHNL_RV_PACING_POLARITY: NORMAL
MDC_IDC_SET_LEADCHNL_RV_PACING_PULSEWIDTH: 0.4 MS
MDC_IDC_SET_LEADCHNL_RV_SENSING_ADAPTATION_MODE: NORMAL
MDC_IDC_SET_LEADCHNL_RV_SENSING_POLARITY: NORMAL
MDC_IDC_SET_LEADCHNL_RV_SENSING_SENSITIVITY: 0.6 MV
MDC_IDC_SET_ZONE_DETECTION_INTERVAL: 250 MS
MDC_IDC_SET_ZONE_DETECTION_INTERVAL: 324 MS
MDC_IDC_SET_ZONE_TYPE: NORMAL
MDC_IDC_SET_ZONE_TYPE: NORMAL
MDC_IDC_SET_ZONE_VENDOR_TYPE: NORMAL
MDC_IDC_SET_ZONE_VENDOR_TYPE: NORMAL
MDC_IDC_STAT_BRADY_DTM_END: NORMAL
MDC_IDC_STAT_BRADY_DTM_START: NORMAL
MDC_IDC_STAT_BRADY_RA_PERCENT_PACED: 0 %
MDC_IDC_STAT_BRADY_RV_PERCENT_PACED: 37 %
MDC_IDC_STAT_EPISODE_RECENT_COUNT: 0
MDC_IDC_STAT_EPISODE_RECENT_COUNT: 214
MDC_IDC_STAT_EPISODE_RECENT_COUNT: 318
MDC_IDC_STAT_EPISODE_RECENT_COUNT_DTM_END: NORMAL
MDC_IDC_STAT_EPISODE_RECENT_COUNT_DTM_START: NORMAL
MDC_IDC_STAT_EPISODE_TYPE: NORMAL
MDC_IDC_STAT_EPISODE_VENDOR_TYPE: NORMAL
MDC_IDC_STAT_TACHYTHERAPY_ATP_DELIVERED_RECENT: 0
MDC_IDC_STAT_TACHYTHERAPY_ATP_DELIVERED_TOTAL: 3
MDC_IDC_STAT_TACHYTHERAPY_RECENT_DTM_END: NORMAL
MDC_IDC_STAT_TACHYTHERAPY_RECENT_DTM_START: NORMAL
MDC_IDC_STAT_TACHYTHERAPY_SHOCKS_ABORTED_RECENT: 0
MDC_IDC_STAT_TACHYTHERAPY_SHOCKS_ABORTED_TOTAL: 0
MDC_IDC_STAT_TACHYTHERAPY_SHOCKS_DELIVERED_RECENT: 0
MDC_IDC_STAT_TACHYTHERAPY_SHOCKS_DELIVERED_TOTAL: 0
MDC_IDC_STAT_TACHYTHERAPY_TOTAL_DTM_END: NORMAL
MDC_IDC_STAT_TACHYTHERAPY_TOTAL_DTM_START: NORMAL

## 2021-12-13 PROCEDURE — 93283 PRGRMG EVAL IMPLANTABLE DFB: CPT | Performed by: INTERNAL MEDICINE

## 2021-12-13 PROCEDURE — 99214 OFFICE O/P EST MOD 30 MIN: CPT | Performed by: INTERNAL MEDICINE

## 2021-12-13 RX ORDER — FAMOTIDINE 20 MG
1 TABLET ORAL DAILY
COMMUNITY
End: 2023-04-21

## 2021-12-13 ASSESSMENT — MIFFLIN-ST. JEOR: SCORE: 1462.11

## 2021-12-13 NOTE — LETTER
12/13/2021    Jessica Ventura NP  [place] [address] [address] [facility] [address]    RE: Dillon Pennington       Dear Colleague,     I had the pleasure of seeing Dillon Pennington in the [facility].  [facility] Care Note    Assessment:  Ischemic congestive cardiomyopathy  Action fraction 20% 2018  Ejection fraction 53% July 2020  Coronary artery disease with intervention April 20, 2018, critical mid LAD 99%, mid circumflex 90%, high-grade diagonal high-grade Impella supported PCI to 3 vessels  Recurrent episodes of ventricular fibrillation  Long QT  Chronic atrial fibrillation-rate control strategy  Dual-chamber [employer] ICD implanted April 24, 2018  Advanced ischemic cardiomyopathy  Echocardiogram July 11, 2018 showed improvement in ejection fraction; earlier 20% now 30-35% there is also less mitral regurgitation   atrial fibrillation with elevated ventricular response-chronic atrial fibrillation  Chronic anticoagulation with Xarelto 15 mg; dose reduced because of renal insufficiency  Chronic renal failure; creatinine 1.78-  Hyperlipidemia well-controlled on high-dose atorvastatin         Plan:  Defibrillator assessment today is excellent  Battery should last another 6.5 years  Heart rhythm remains atrial fibrillation with controlled heart rate but no dangerous arrhythmias have been identified  Continue current medications-no adjustments  Have device check through transtelephonic monitoring every 3 months  Continue with your weight loss program and low-salt intake  Follow-up with Dr. Russo in 6 months  Return in 1 year for comprehensive cardiac arrhythmia device assessment          Subjective:    I had the opportunity to see.Dillon Pennington , who is a 66 year old male with a known history of ischemic cardiomyopathy  Interview was done through telephone with a qualified   Mr. Pennington has been doing quite well  Can do all his day-to-day activities without limitations    No  excessive breathlessness  No anginal symptoms  palpitations  when he works hard he notes his heart  Rate is a bit faster  No orthopnea PND or pedal edema therapy is been ongoing no syncope.  Has a careful program  for taking his complex medications  A nurse sets out a month supply with weekly containers; he is carefult to take all his pills on schedule seems highly compliant .  Has had a bit of epistaxis while taking Xarelto 15 mg daily-seems only minor   No other bleeding problems  His girlfriend tells me he has actually been doing better seems stronger and has more endurance    His ejection fraction has improved  Echocardiogram July 21, 2020 showed ejection fraction of 53%        Problem List:  Patient Active Problem List   Diagnosis     Chronic kidney disease (CKD), stage III (moderate) (H)     Chronic systolic heart failure (H)     Dilated cardiomyopathy (H)     Permanent atrial fibrillation     VF (ventricular fibrillation) (H)     Coronary artery disease involving native coronary artery of native heart without angina pectoris     Ventricular tachycardia (H)     Anemia     Dyslipidemia, goal LDL below 70     ICD (implantable cardioverter-defibrillator) in place, dual chamber     Cough     Food insecurity     Limitation of activities due to disability     Dyspnea     Ischemic cardiomyopathy     Medical History:  Past Medical History:   Diagnosis Date     DMITRI (acute kidney injury) (H)      CKD (chronic kidney disease)      Dyslipidemia, goal LDL below 70 4/16/2018     Mushroom poisoning 9/11/2006     Nonischemic cardiomyopathy (H)      Paroxysmal atrial fibrillation (H)      Persistent atrial fibrillation (H) 4/12/2018     Surgical History:  Past Surgical History:   Procedure Laterality Date     CHOLECYSTECTOMY       CV CORONARY ANGIOGRAM N/A 4/18/2018    Procedure: Coronary Angiogram;  Surgeon: Heriberto Cheung MD;  Location: [facility] Cath Lab;  Service:      CV CORONARY ANGIOGRAM N/A 4/20/2018    Procedure:  Coronary Angiogram;  Surgeon: Javon Carrington MD;  Location: [facility] Cath Lab;  Service:      CV IMPELLA INSERT N/A 4/20/2018    Procedure: Impella Insert;  Surgeon: Javon Carrington MD;  Location: [facility] Cath Lab;  Service:      CV LEFT HEART CATHETERIZATION WITHOUT LEFT VENTRICULOGRAM Left 4/18/2018    Procedure: Left Heart Catheterization Without Left Ventriculogram;  Surgeon: Heriberto Cheung MD;  Location: [facility] Cath Lab;  Service:      EP ICD INSERT N/A 4/23/2018    Procedure: EP ICD Insert;  Surgeon: Rakesh Rivera MD;  Location: [facility] Cath Lab;  Service:      PICC  4/18/2018          Social History:  Social History     Socioeconomic History     Marital status:      Spouse name: Not on file     Number of children: Not on file     Years of education: Not on file     Highest education level: Not on file   Occupational History     Not on file   Tobacco Use     Smoking status: Never Smoker     Smokeless tobacco: Never Used   Substance and Sexual Activity     Alcohol use: Yes     Comment: Alcoholic Drinks/day: very rare      Drug use: No     Sexual activity: Not on file   Other Topics Concern     Not on file   Social History Narrative    The patient's son was present to provide the HPI.     Social Determinants of Health     Financial Resource Strain: Not on file   Food Insecurity: Not on file   Transportation Needs: Not on file   Physical Activity: Not on file   Stress: Not on file   Social Connections: Not on file   Intimate Partner Violence: Not on file   Housing Stability: Not on file       Review of Systems   ,         Family History:  No family history on file.      Allergies:  No Known Allergies    Medications:  Current Outpatient Medications   Medication Sig Dispense Refill     acetaminophen (TYLENOL) 325 MG tablet [ACETAMINOPHEN (TYLENOL) 325 MG TABLET] Take 650 mg by mouth every 6 (six) hours as needed for pain.       allopurinol (ZYLOPRIM) 300 MG tablet  [ALLOPURINOL (ZYLOPRIM) 300 MG TABLET] Take 300 mg by mouth daily.       atorvastatin (LIPITOR) 80 MG tablet [ATORVASTATIN (LIPITOR) 80 MG TABLET] Take 1 tablet (80 mg total) by mouth daily. 90 tablet 0     cetirizine (ZYRTEC) 10 MG tablet [CETIRIZINE (ZYRTEC) 10 MG TABLET] Take 1 tablet by mouth daily.  0     clopidogreL (PLAVIX) 75 mg tablet [CLOPIDOGREL (PLAVIX) 75 MG TABLET] TAKE 1 PILL (75 MG TOTAL) BY MOUTH DAILY /TXHUA HNUB NOJ 1 LUB [facility] KOM NTSHAV STEFFANIE 90 tablet 2     diltiazem ER COATED BEADS (CARDIZEM CD/CARTIA XT) 180 MG 24 hr capsule TAKE 1 CAPSULE (180 MG TOTAL) BY MOUTH DAILY/ TXHUA HNUB NOJ 1 LUB [facility] ZOO NTSHAV SIAB 90 capsule 1     fluticasone (VERAMYST) 27.5 mcg/actuation nasal spray Spray 1 spray in nostril nightly as needed        furosemide (LASIX) 20 MG tablet [FUROSEMIDE (LASIX) 20 MG TABLET] Take 1 tablet (20 mg total) by mouth daily. 90 tablet 0     hydrALAZINE (APRESOLINE) 25 MG tablet [HYDRALAZINE (APRESOLINE) 25 MG TABLET] TAKE 1 PILL (25 MG TOTAL) BY MOUTH 3 TIMES EVERYDAY/ TXHUA HNUB NOJ 1 LUB [facility] 3 ZAUG 270 tablet 2     levothyroxine (SYNTHROID) 88 MCG tablet [LEVOTHYROXINE (SYNTHROID) 88 MCG TABLET] Take 88 mcg by mouth Daily at 6:00 am. Indications: hypothyroidism       metoprolol succinate (TOPROL-XL) 100 MG 24 hr tablet [METOPROLOL SUCCINATE (TOPROL-XL) 100 MG 24 HR TABLET] TAKE 2 TABLETS (200 MG TOTAL) BY MOUTH DAILY/ TXHUA HNUB NOJ 2 LUB [facility] NTSV SIAB 180 tablet 1     nitroglycerin (NITROSTAT) 0.4 MG SL tablet [NITROGLYCERIN (NITROSTAT) 0.4 MG SL TABLET] Place 1 tablet (0.4 mg total) under the tongue every 5 (five) minutes as needed for chest pain. 1 Bottle 3     Vitamin D, Cholecalciferol, 25 MCG (1000 UT) CAPS Take 1 capsule by mouth daily       XARELTO 15 mg tablet [XARELTO 15 MG TABLET] TAKE 1 TABLET (15 MG TOTAL) BY MOUTH DAILY WITH SUPPER. 90 tablet 1     cholecalciferol, vitamin D3, (VITAMIN D3) 5,000 unit Tab [CHOLECALCIFEROL, VITAMIN D3,  "(VITAMIN D3) 5,000 UNIT TAB] Take 5,000 Units by mouth Daily at 5 pm. (Patient not taking: Reported on 12/13/2021)           Surgical site  The ICD is well-healed to left subclavicular region    Device interrogation  Chronic atrial fibrillation with controlled ventricular response  Ventricular pacing 37%  Occasions of elevated heart rate less than 5% above 120 bpm systolic  Weight and was elevated heart rate is appear to be atrial fibrillation with increased ventricular response and not ventricular tachycardia.  Lead function satisfactory    Objective:   Vital signs:  /80 (BP Location: Right arm, Patient Position: Sitting, Cuff Size: Adult Regular)   Pulse 64   Resp 16   Ht 1.626 m (5' 4\")   Wt 77.1 kg (170 lb)   BMI 29.18 kg/m        Physical Exam:      GENERAL APPEARANCE: Alert, cooperative and in no acute distress.  HEENT: No scleral icterus. No Xanthelasma. Oral mucous membranes pink and moist.  NECK: JVP cm. No Hepatojugular reflux. Thyroid not  Palpable  CHEST: clear to auscultation and percussion  CARDIOVASCULAR: S1, S2 without murmur    Brachial, radial  pulses are intact and symmetric.   No carotid bruits noted.  ABDOMEN: Non tender. BS+. No bruits.  EXTREMITIES: No cyanosis, clubbing or edema.    Lab Results:  LIPIDS:  Lab Results   Component Value Date    CHOL 167 07/20/2021    CHOL 127 10/08/2020    CHOL 156 08/17/2018     Lab Results   Component Value Date    HDL 36 (L) 07/20/2021    HDL 38 (L) 10/08/2020    HDL 43 08/17/2018     No components found for: LDLCALC  Lab Results   Component Value Date    TRIG 553 (H) 07/20/2021    TRIG 139 10/08/2020    TRIG 201 (H) 08/17/2018     No components found for: CHOLHDL    BMP:  Lab Results   Component Value Date    BUN 35 (H) 07/30/2021     07/30/2021    CO2 23 07/30/2021         This note has been dictated using voice recognition software. Any grammatical or context distortions are unintentional and inherent to the software.  Rakesh Rivera" MD  [facility] HEART CARE  [phone]      Thank you for allowing me to participate in the care of your patient.      Sincerely,     Rakesh Rivera MD, MD     [facility] Heart Care  cc:   Rakesh Rivera MD  [address] [place] [id number]

## 2021-12-13 NOTE — PROGRESS NOTES
[facility] Heart Care Note    Assessment:  Ischemic congestive cardiomyopathy  Action fraction 20% 2018  Ejection fraction 53% July 2020  Coronary artery disease with intervention April 20, 2018, critical mid LAD 99%, mid circumflex 90%, high-grade diagonal high-grade Impella supported PCI to 3 vessels  Recurrent episodes of ventricular fibrillation  Long QT  Chronic atrial fibrillation-rate control strategy  Dual-chamber [employer] ICD implanted April 24, 2018  Advanced ischemic cardiomyopathy  Echocardiogram July 11, 2018 showed improvement in ejection fraction; earlier 20% now 30-35% there is also less mitral regurgitation   atrial fibrillation with elevated ventricular response-chronic atrial fibrillation  Chronic anticoagulation with Xarelto 15 mg; dose reduced because of renal insufficiency  Chronic renal failure; creatinine 1.78-  Hyperlipidemia well-controlled on high-dose atorvastatin         Plan:  Defibrillator assessment today is excellent  Battery should last another 6.5 years  Heart rhythm remains atrial fibrillation with controlled heart rate but no dangerous arrhythmias have been identified  Continue current medications-no adjustments  Have device check through transtelephonic monitoring every 3 months  Continue with your weight loss program and low-salt intake  Follow-up with Dr. Russo in 6 months  Return in 1 year for comprehensive cardiac arrhythmia device assessment          Subjective:    I had the opportunity to see.Dillon Pennington , who is a 66 year old male with a known history of ischemic cardiomyopathy  Interview was done through telephone with a qualified   Mr. Pennington has been doing quite well  Can do all his day-to-day activities without limitations    No excessive breathlessness  No anginal symptoms  palpitations  when he works hard he notes his heart  Rate is a bit faster  No orthopnea PND or pedal edema therapy is been ongoing no syncope.  Has a careful program  for taking  his complex medications  A nurse sets out a month supply with weekly containers; he is carefult to take all his pills on schedule seems highly compliant .  Has had a bit of epistaxis while taking Xarelto 15 mg daily-seems only minor   No other bleeding problems  His girlfriend tells me he has actually been doing better seems stronger and has more endurance    His ejection fraction has improved  Echocardiogram July 21, 2020 showed ejection fraction of 53%        Problem List:  Patient Active Problem List   Diagnosis     Chronic kidney disease (CKD), stage III (moderate) (H)     Chronic systolic heart failure (H)     Dilated cardiomyopathy (H)     Permanent atrial fibrillation     VF (ventricular fibrillation) (H)     Coronary artery disease involving native coronary artery of native heart without angina pectoris     Ventricular tachycardia (H)     Anemia     Dyslipidemia, goal LDL below 70     ICD (implantable cardioverter-defibrillator) in place, dual chamber     Cough     Food insecurity     Limitation of activities due to disability     Dyspnea     Ischemic cardiomyopathy     Medical History:  Past Medical History:   Diagnosis Date     DMITRI (acute kidney injury) (H)      CKD (chronic kidney disease)      Dyslipidemia, goal LDL below 70 4/16/2018     Mushroom poisoning 9/11/2006     Nonischemic cardiomyopathy (H)      Paroxysmal atrial fibrillation (H)      Persistent atrial fibrillation (H) 4/12/2018     Surgical History:  Past Surgical History:   Procedure Laterality Date     CHOLECYSTECTOMY       CV CORONARY ANGIOGRAM N/A 4/18/2018    Procedure: Coronary Angiogram;  Surgeon: Heriberto Cheung MD;  Location: [facility] Cath Lab;  Service:      CV CORONARY ANGIOGRAM N/A 4/20/2018    Procedure: Coronary Angiogram;  Surgeon: Javon Carrignton MD;  Location: [facility] Cath Lab;  Service:      CV IMPELLA INSERT N/A 4/20/2018    Procedure: Impella Insert;  Surgeon: Javon Carrington MD;  Location:   [facility] Cath Lab;  Service:      CV LEFT HEART CATHETERIZATION WITHOUT LEFT VENTRICULOGRAM Left 4/18/2018    Procedure: Left Heart Catheterization Without Left Ventriculogram;  Surgeon: Heriberto Cheung MD;  Location: [facility] Cath Lab;  Service:      EP ICD INSERT N/A 4/23/2018    Procedure: EP ICD Insert;  Surgeon: Rakesh Rivera MD;  Location: [facility] Cath Lab;  Service:      PICC  4/18/2018          Social History:  Social History     Socioeconomic History     Marital status:      Spouse name: Not on file     Number of children: Not on file     Years of education: Not on file     Highest education level: Not on file   Occupational History     Not on file   Tobacco Use     Smoking status: Never Smoker     Smokeless tobacco: Never Used   Substance and Sexual Activity     Alcohol use: Yes     Comment: Alcoholic Drinks/day: very rare      Drug use: No     Sexual activity: Not on file   Other Topics Concern     Not on file   Social History Narrative    The patient's son was present to provide the HPI.     Social Determinants of Health     Financial Resource Strain: Not on file   Food Insecurity: Not on file   Transportation Needs: Not on file   Physical Activity: Not on file   Stress: Not on file   Social Connections: Not on file   Intimate Partner Violence: Not on file   Housing Stability: Not on file       Review of Systems   ,         Family History:  No family history on file.      Allergies:  No Known Allergies    Medications:  Current Outpatient Medications   Medication Sig Dispense Refill     acetaminophen (TYLENOL) 325 MG tablet [ACETAMINOPHEN (TYLENOL) 325 MG TABLET] Take 650 mg by mouth every 6 (six) hours as needed for pain.       allopurinol (ZYLOPRIM) 300 MG tablet [ALLOPURINOL (ZYLOPRIM) 300 MG TABLET] Take 300 mg by mouth daily.       atorvastatin (LIPITOR) 80 MG tablet [ATORVASTATIN (LIPITOR) 80 MG TABLET] Take 1 tablet (80 mg total) by mouth daily. 90 tablet 0     cetirizine  (ZYRTEC) 10 MG tablet [CETIRIZINE (ZYRTEC) 10 MG TABLET] Take 1 tablet by mouth daily.  0     clopidogreL (PLAVIX) 75 mg tablet [CLOPIDOGREL (PLAVIX) 75 MG TABLET] TAKE 1 PILL (75 MG TOTAL) BY MOUTH DAILY /TXA UB NOJ 1 [facility] KO NTSHAV STEFFANIE 90 tablet 2     diltiazem ER COATED BEADS (CARDIZEM CD/CARTIA XT) 180 MG 24 hr capsule TAKE 1 CAPSULE (180 MG TOTAL) BY MOUTH DAILY/ TXHUA UB NOJ 1 [facility] NTSV SIAB 90 capsule 1     fluticasone (VERAMYST) 27.5 mcg/actuation nasal spray Spray 1 spray in nostril nightly as needed        furosemide (LASIX) 20 MG tablet [FUROSEMIDE (LASIX) 20 MG TABLET] Take 1 tablet (20 mg total) by mouth daily. 90 tablet 0     hydrALAZINE (APRESOLINE) 25 MG tablet [HYDRALAZINE (APRESOLINE) 25 MG TABLET] TAKE 1 PILL (25 MG TOTAL) BY MOUTH 3 TIMES EVERYDAY/ TXA UB NOJ 1 [facility] 3 ZAUG 270 tablet 2     levothyroxine (SYNTHROID) 88 MCG tablet [LEVOTHYROXINE (SYNTHROID) 88 MCG TABLET] Take 88 mcg by mouth Daily at 6:00 am. Indications: hypothyroidism       metoprolol succinate (TOPROL-XL) 100 MG 24 hr tablet [METOPROLOL SUCCINATE (TOPROL-XL) 100 MG 24 HR TABLET] TAKE 2 TABLETS (200 MG TOTAL) BY MOUTH DAILY/ [facility]     nitroglycerin (NITROSTAT) 0.4 MG SL tablet [NITROGLYCERIN (NITROSTAT) 0.4 MG SL TABLET] Place 1 tablet (0.4 mg total) under the tongue every 5 (five) minutes as needed for chest pain. 1 Bottle 3     Vitamin D, Cholecalciferol, 25 MCG (1000 UT) CAPS Take 1 capsule by mouth daily       XARELTO 15 mg tablet [XARELTO 15 MG TABLET] TAKE 1 TABLET (15 MG TOTAL) BY MOUTH DAILY WITH SUPPER. 90 tablet 1     cholecalciferol, vitamin D3, (VITAMIN D3) 5,000 unit Tab [CHOLECALCIFEROL, VITAMIN D3, (VITAMIN D3) 5,000 UNIT TAB] Take 5,000 Units by mouth Daily at 5 pm. (Patient not taking: Reported on 12/13/2021)           Surgical site  The ICD is well-healed to left subclavicular region    Device  "interrogation  Chronic atrial fibrillation with controlled ventricular response  Ventricular pacing 37%  Occasions of elevated heart rate less than 5% above 120 bpm systolic  Weight and was elevated heart rate is appear to be atrial fibrillation with increased ventricular response and not ventricular tachycardia.  Lead function satisfactory    Objective:   Vital signs:  /80 (BP Location: Right arm, Patient Position: Sitting, Cuff Size: Adult Regular)   Pulse 64   Resp 16   Ht 1.626 m (5' 4\")   Wt 77.1 kg (170 lb)   BMI 29.18 kg/m        Physical Exam:      GENERAL APPEARANCE: Alert, cooperative and in no acute distress.  HEENT: No scleral icterus. No Xanthelasma. Oral mucous membranes pink and moist.  NECK: JVP cm. No Hepatojugular reflux. Thyroid not  Palpable  CHEST: clear to auscultation and percussion  CARDIOVASCULAR: S1, S2 without murmur    Brachial, radial  pulses are intact and symmetric.   No carotid bruits noted.  ABDOMEN: Non tender. BS+. No bruits.  EXTREMITIES: No cyanosis, clubbing or edema.    Lab Results:  LIPIDS:  Lab Results   Component Value Date    CHOL 167 07/20/2021    CHOL 127 10/08/2020    CHOL 156 08/17/2018     Lab Results   Component Value Date    HDL 36 (L) 07/20/2021    HDL 38 (L) 10/08/2020    HDL 43 08/17/2018     No components found for: LDLCALC  Lab Results   Component Value Date    TRIG 553 (H) 07/20/2021    TRIG 139 10/08/2020    TRIG 201 (H) 08/17/2018     No components found for: CHOLHDL    BMP:  Lab Results   Component Value Date    BUN 35 (H) 07/30/2021     07/30/2021    CO2 23 07/30/2021         This note has been dictated using voice recognition software. Any grammatical or context distortions are unintentional and inherent to the software.  Raeksh Rivera MD  [facility]  [phone]      "

## 2021-12-13 NOTE — PATIENT INSTRUCTIONS
Dillon Pennington    Thank you for coming to the [facility] today for a cardiac evaluation  It was my pleasure to see you today  A good contact for any questions would be Beverly Murphy  RN @ [phone]    Defibrillator assessment today is excellent  Battery should last another 6.5 years  Heart rhythm remains atrial fibrillation with controlled heart rate but no dangerous arrhythmias have been identified  Continue current medications-no adjustments  Have device check through transtelephonic monitoring every 3 months  Continue with your weight loss program and low-salt intake  Follow-up with Dr. Russo in 6 months  Return in 1 year for comprehensive cardiac arrhythmia device assessment

## 2022-01-27 ENCOUNTER — OFFICE VISIT (OUTPATIENT)
Dept: CARDIOLOGY | Facility: CLINIC | Age: 67
End: 2022-01-27
Payer: COMMERCIAL

## 2022-01-27 VITALS — HEART RATE: 60 BPM | RESPIRATION RATE: 12 BRPM | SYSTOLIC BLOOD PRESSURE: 104 MMHG | DIASTOLIC BLOOD PRESSURE: 80 MMHG

## 2022-01-27 DIAGNOSIS — I25.83 CORONARY ARTERY DISEASE DUE TO LIPID RICH PLAQUE: Primary | ICD-10-CM

## 2022-01-27 DIAGNOSIS — I25.10 CORONARY ARTERY DISEASE DUE TO LIPID RICH PLAQUE: Primary | ICD-10-CM

## 2022-01-27 PROCEDURE — 99214 OFFICE O/P EST MOD 30 MIN: CPT | Performed by: INTERNAL MEDICINE

## 2022-01-27 NOTE — LETTER
1/27/2022    Jessica Ventura NP  [address] [address]    RE: Dillno Pennington       Dear Colleague,     I had the pleasure of seeing Dillon Pennington in the [facility].    HEART CARE ENCOUNTER CONSULTATON NOTE      [facility]  [phone]      Assessment/Recommendations   Assessment/Plan:  1.  Coronary artery disease with ischemic cardiomyopathy.  Multivessel stenting in April 2018 with significant improvement in ejection fraction.  Ejection fraction in 2000 1820% and most recently July 2020 53%.  No complaints of angina or congestive heart failure.  He does remain on Plavix in combination with Xarelto for atrial fibrillation.  I have elected to continue Plavix because of his diffuse coronary artery disease history.  Plan for follow-up echocardiogram.    2.  Persistent atrial fibrillation.  Most recent device interrogation completed December 2021 demonstrated permanent atrial fibrillation with controlled ventricular rates.  Nonsustained ventricular tachycardia.  He has been seen in combination with Dr. Kleber Echeverria from .    3.  Hypertension.  Blood pressure appears to be under good control based on blood pressure readings today.  He is not reporting dizziness or lightheadedness I did not make any changes in his medication regimen.    4.  Dyslipidemia.  Lipids are reviewed from July 2021.  His total cholesterol was 167, HDL 36, LDL 75 with elevated triglycerides of 553.  Is not clear to me if this was a fasting specimen.  I am going to repeat the lipid panel when he comes in for the echocardiogram    Plan echocardiogram and lipid panel  Follow-up 6 months         History of Present Illness/Subjective    HPI: Dillon Pennington is a 66 year old male who is seen in follow up.Dillon Pennington is a 66 y.o. male who returned for a planned  follow up visit.He has a history of ischemic cardiomyopathy with an ejection fraction as low as 20% in 2018.  He  underwent intervention April 2018 with a critical mid LAD stenosis, circumflex and high-grade diagonal stenosis with Impella support.  Ejection fraction has improved with the most recent echocardiogram being from July 2020 where ejection fraction is now mildly reduced to 53% and said to be significantly improved from June 2019.    He is seen with the help of the  by telephone today.  He reports feeling very well.  Specifically denies chest pain, shortness of breath, dizziness or lightheadedness.  He states that he is able to walk reasonable distances.  I reviewed his current combination of medications and confirmed this by reviewing his medicines that he brought with him today.  He did bring up a concern regarding his home monitoring device and he indicates that he was not understanding of recent compensation with the device team.  I did send him a note as they were not available here in the office this afternoon.       Recent Echocardiogram Results:   Result Note    Details    Reading Physician Reading Date Result Priority   Mario Jordan,   [phone] 7/21/2020 Routine   Provider, Historical 7/21/2020      Narrative & Impression    Normal left ventricular size.The calculated left ventricular ejection fraction is 53%. This represents a mildly decreased ejection fraction.    The following segments are hypokinetic: apical inferior. All other segments are normal.    Left atrial volume is severely increased.    Mild tricuspid valve regurgitation. No pulmonary hypertension present. The estimated systolic pulmonary artery pressure is 19 mmhg.    Mild mitral regurgitation.    When compared to the previous study dated 6/12/2019, significant improvement in lleft ventricular function.         Recent Coronary Angiogram Results:    12/13/21  1:55 PM 12/13/21  1:47 PM [id number] [facility]  Services        Narrative & Impression    Type:  In clinic annual ICD check, followed by visit with Dr Rivera  Presenting: AF /VS 60s bpm   Lead/Battery Status: Stable  Atrial Arrhythmias: Permanent AF, programmed VVIR, <5% v-rates >/=120bpm.   Vent Arrhythmias: 214 NSVT, 318 other untreated- available EGMs appear to show RVR, patient endorses occasional mild palpitations but no other symptoms.   Anticoagulant: Xarelto  Comments: Normal device function. No changes. Shwetha Fischer RN.               I have reviewed and interpreted the device interrogation, settings, programming, and encounter summary. The device is functioning within normal device parameters. I agree with the current findings, assessment and plan.          Physical Examination  Review of Systems   Vitals: 104/80, weight 77.1 kg, heart rate of 60 and regular.  Wt Readings from Last 3 Encounters:   12/13/21 77.1 kg (170 lb)   04/26/21 77.1 kg (170 lb)   11/11/20 78 kg (172 lb)       General Appearance:   no distress, normal body habitus   ENT/Mouth: membranes moist, no oral lesions or bleeding gums.      EYES:  no scleral icterus, normal conjunctivae   Neck: no carotid bruits or thyromegaly   Chest/Lungs:   lungs are clear to auscultation, no rales or wheezing, well-healed device scar, equal chest wall expansion    Cardiovascular:    Distant, regular. Normal first and second heart sounds with soft systolic murmur, rubs, or gallops; the carotid, radial and posterior tibial pulses are intact, Jugular venous pressure within normal limits, no significant edema bilaterally    Abdomen:  no organomegaly, masses, bruits, or tenderness; bowel sounds are present   Extremities: no cyanosis or clubbing   Skin: no xanthelasma, warm.    Neurologic: normal  bilateral, no tremors     Psychiatric: alert and oriented x3, calm        Please refer above for cardiac ROS details.        Medical History  Surgical History Family History Social History   Past Medical History:   Diagnosis Date     DMITRI (acute kidney  injury) (H)      CKD (chronic kidney disease)      Dyslipidemia, goal LDL below 70 4/16/2018     Mushroom poisoning 9/11/2006     Nonischemic cardiomyopathy (H)      Paroxysmal atrial fibrillation (H)      Persistent atrial fibrillation (H) 4/12/2018     Past Surgical History:   Procedure Laterality Date     CHOLECYSTECTOMY       CV CORONARY ANGIOGRAM N/A 4/18/2018    Procedure: Coronary Angiogram;  Surgeon: Heriberto Cheung MD;  Location: [facility] Cath Lab;  Service:      CV CORONARY ANGIOGRAM N/A 4/20/2018    Procedure: Coronary Angiogram;  Surgeon: Javon Carrington MD;  Location: [facility] Cath Lab;  Service:      CV IMPELLA INSERT N/A 4/20/2018    Procedure: Impella Insert;  Surgeon: Javon Carrington MD;  Location: [facility] Cath Lab;  Service:      CV LEFT HEART CATHETERIZATION WITHOUT LEFT VENTRICULOGRAM Left 4/18/2018    Procedure: Left Heart Catheterization Without Left Ventriculogram;  Surgeon: Heriebrto Cheung MD;  Location: [facility] Cath Lab;  Service:      EP ICD INSERT N/A 4/23/2018    Procedure: EP ICD Insert;  Surgeon: Rakesh Rivera MD;  Location: [facility] Cath Lab;  Service:      PICC  4/18/2018          No family history on file.     Social History     Socioeconomic History     Marital status:      Spouse name: Not on file     Number of children: Not on file     Years of education: Not on file     Highest education level: Not on file   Occupational History     Not on file   Tobacco Use     Smoking status: Never Smoker     Smokeless tobacco: Never Used   Substance and Sexual Activity     Alcohol use: Yes     Comment: Alcoholic Drinks/day: very rare      Drug use: No     Sexual activity: Not on file   Other Topics Concern     Not on file   Social History Narrative    The patient's son was present to provide the HPI.     Social Determinants of Health     Financial Resource Strain: Not on file   Food Insecurity: Not on file   Transportation Needs: Not on file    Physical Activity: Not on file   Stress: Not on file   Social Connections: Not on file   Intimate Partner Violence: Not on file   Housing Stability: Not on file           Medications  Allergies   Current Outpatient Medications   Medication Sig Dispense Refill     acetaminophen (TYLENOL) 325 MG tablet [ACETAMINOPHEN (TYLENOL) 325 MG TABLET] Take 650 mg by mouth every 6 (six) hours as needed for pain.       allopurinol (ZYLOPRIM) 300 MG tablet [ALLOPURINOL (ZYLOPRIM) 300 MG TABLET] Take 300 mg by mouth daily.       atorvastatin (LIPITOR) 80 MG tablet [ATORVASTATIN (LIPITOR) 80 MG TABLET] Take 1 tablet (80 mg total) by mouth daily. 90 tablet 0     cetirizine (ZYRTEC) 10 MG tablet [CETIRIZINE (ZYRTEC) 10 MG TABLET] Take 1 tablet by mouth daily.  0     cholecalciferol, vitamin D3, (VITAMIN D3) 5,000 unit Tab [CHOLECALCIFEROL, VITAMIN D3, (VITAMIN D3) 5,000 UNIT TAB] Take 5,000 Units by mouth Daily at 5 pm. (Patient not taking: Reported on 12/13/2021)       clopidogrel (PLAVIX) 75 MG tablet TAKE 1 PILL (75 MG TOTAL) BY MOUTH DAILY /TXHUA HNUB NOJ 1 LUB TSHUAJ PAB KOM NTSHAV STEFFANIE 90 tablet 1     diltiazem ER COATED BEADS (CARDIZEM CD/CARTIA XT) 180 MG 24 hr capsule TAKE 1 CAPSULE (180 MG TOTAL) BY MOUTH DAILY/ TXHUA HNUB NOJ 1 LUB TSHUAJ PAB ZOO NTSHAV SIAB 90 capsule 1     fluticasone (VERAMYST) 27.5 mcg/actuation nasal spray Spray 1 spray in nostril nightly as needed        furosemide (LASIX) 20 MG tablet [FUROSEMIDE (LASIX) 20 MG TABLET] Take 1 tablet (20 mg total) by mouth daily. 90 tablet 0     hydrALAZINE (APRESOLINE) 25 MG tablet [HYDRALAZINE (APRESOLINE) 25 MG TABLET] TAKE 1 PILL (25 MG TOTAL) BY MOUTH 3 TIMES EVERYDAY/ TXHUA HNUB NOJ 1 LUB TSHUAJ 3 ZAUG 270 tablet 2     levothyroxine (SYNTHROID) 88 MCG tablet [LEVOTHYROXINE (SYNTHROID) 88 MCG TABLET] Take 88 mcg by mouth Daily at 6:00 am. Indications: hypothyroidism       metoprolol succinate (TOPROL-XL) 100 MG 24 hr tablet [METOPROLOL SUCCINATE (TOPROL-XL) 100  MG 24 HR TABLET] TAKE 2 TABLETS (200 MG TOTAL) BY MOUTH DAILY/ TXHUA HNUB NOJ 2 LUB TSHUAJ PAB ZOO NTSHAV SIAB 180 tablet 1     nitroGLYcerin (NITROSTAT) 0.4 MG sublingual tablet DISSOLVE 1 PILL UNDER TONGUE EVERY 5 MINUTES AS NEEDED FOR CHEST PAIN/YOG MOB HAUV SIAB MUAB IB LUB TSHUAJ NAVYA HAUV QAB NPLAIG TXHUA 5 DINA THIS 25 tablet 3     Vitamin D, Cholecalciferol, 25 MCG (1000 UT) CAPS Take 1 capsule by mouth daily       XARELTO 15 mg tablet [XARELTO 15 MG TABLET] TAKE 1 TABLET (15 MG TOTAL) BY MOUTH DAILY WITH SUPPER. 90 tablet 1     No Known Allergies       Lab Results    Chemistry/lipid CBC Cardiac Enzymes/BNP/TSH/INR   Recent Labs   Lab Test 07/20/21  1512   CHOL 167   HDL 36*   LDL 75   TRIG 553*     Recent Labs   Lab Test 07/20/21  1512 10/08/20  1353 08/17/18  1556   LDL 75 61 73     Recent Labs   Lab Test 07/30/21  1437      POTASSIUM 3.5   CHLORIDE 106   CO2 23      BUN 35*   CR 1.74*   GFRESTIMATED 40*   BOB 9.7     Recent Labs   Lab Test 07/30/21  1437 07/20/21  1512 10/08/20  1353   CR 1.74* 1.59* 1.59*     Recent Labs   Lab Test 04/20/18  1025   A1C 5.3          Recent Labs   Lab Test 02/19/20  1506   WBC 9.0   HGB 16.3   HCT 48.4   MCV 94        Recent Labs   Lab Test 02/19/20  1506 11/02/19  1049 05/16/18  2035   HGB 16.3 16.4 11.5*    Recent Labs   Lab Test 05/16/18  2035 04/17/18  2212 04/13/18  0246   TROPONINI <0.01 0.06 0.12     Recent Labs   Lab Test 02/19/20  1506 05/16/18  2035 04/17/18  2212   * 81* 335*     Recent Labs   Lab Test 07/20/21  1512   TSH 1.69     Recent Labs   Lab Test 11/02/19  1049 04/29/18  1139 04/26/18  1447   INR 1.47* 1.19* 1.79*        Parth Russo MD                Thank you for allowing me to participate in the care of your patient.      Sincerely,     Parth Russo MD     [facility]  cc:   Parth Russo MD  [address] [address]

## 2022-01-27 NOTE — PROGRESS NOTES
HEART CARE ENCOUNTER CONSULTATON NOTE      [facility]  [phone]      Assessment/Recommendations   Assessment/Plan:  1.  Coronary artery disease with ischemic cardiomyopathy.  Multivessel stenting in April 2018 with significant improvement in ejection fraction.  Ejection fraction in 2018 20% and most recently July 2020 53%.  No complaints of angina or congestive heart failure.  He does remain on Plavix in combination with Xarelto for atrial fibrillation.  I have elected to continue Plavix because of his diffuse coronary artery disease history.  Plan for follow-up echocardiogram.    2.  Persistent atrial fibrillation.  Most recent device interrogation completed December 2021 demonstrated permanent atrial fibrillation with controlled ventricular rates.  Nonsustained ventricular tachycardia.  He has been seen in combination with Dr. Rivera from .    3.  Hypertension.  Blood pressure appears to be under good control based on blood pressure readings today.  He is not reporting dizziness or lightheadedness I did not make any changes in his medication regimen.    4.  Dyslipidemia.  Lipids are reviewed from July 2021.  His total cholesterol was 167, HDL 36, LDL 75 with elevated triglycerides of 553.  Is not clear to me if this was a fasting specimen.  I am going to repeat the lipid panel when he comes in for the echocardiogram    Plan echocardiogram and lipid panel  Follow-up 6 months         History of Present Illness/Subjective    HPI: Dillon Pennington is a 66 year old male who is seen in follow up.Dillon Pennington is a 66 y.o. male who returned for a planned  follow up visit.He has a history of ischemic cardiomyopathy with an ejection fraction as low as 20% in 2018.  He underwent intervention April 2018 with a critical mid LAD stenosis, circumflex and high-grade diagonal stenosis with Impella support.  Ejection fraction has improved with the most recent echocardiogram being from July 2020 where  ejection fraction is now mildly reduced to 53% and said to be significantly improved from June 2019.    He is seen with the help of the  by telephone today.  He reports feeling very well.  Specifically denies chest pain, shortness of breath, dizziness or lightheadedness.  He states that he is able to walk reasonable distances.  I reviewed his current combination of medications and confirmed this by reviewing his medicines that he brought with him today.  He did bring up a concern regarding his home monitoring device and he indicates that he was not understanding of recent compensation with the device team.  I did send him a note as they were not available here in the office this afternoon.       Recent Echocardiogram Results:   Result Note    Details    Reading Physician Reading Date Result Priority   Mario Jordan,   [phone] 7/21/2020 Routine   Provider, Historical 7/21/2020      Narrative & Impression    Normal left ventricular size.The calculated left ventricular ejection fraction is 53%. This represents a mildly decreased ejection fraction.    The following segments are hypokinetic: apical inferior. All other segments are normal.    Left atrial volume is severely increased.    Mild tricuspid valve regurgitation. No pulmonary hypertension present. The estimated systolic pulmonary artery pressure is 19 mmhg.    Mild mitral regurgitation.    When compared to the previous study dated 6/12/2019, significant improvement in lleft ventricular function.         Recent Coronary Angiogram Results:    12/13/21  1:55 PM 12/13/21  1:47 PM [id number] [facility]  Services        Narrative & Impression    Type: In clinic annual ICD check, followed by visit with Dr Rivera  Presenting: AF /VS 60s bpm   Lead/Battery Status: Stable  Atrial Arrhythmias: Permanent AF, programmed VVIR, <5% v-rates >/=120bpm.   Vent Arrhythmias: 214 NSVT, 318  other untreated- available EGMs appear to show RVR, patient endorses occasional mild palpitations but no other symptoms.   Anticoagulant: Xarelto  Comments: Normal device function. No changes. Shwetha Fischer RN.               I have reviewed and interpreted the device interrogation, settings, programming, and encounter summary. The device is functioning within normal device parameters. I agree with the current findings, assessment and plan.          Physical Examination  Review of Systems   Vitals: 104/80, weight 77.1 kg, heart rate of 60 and regular.  Wt Readings from Last 3 Encounters:   12/13/21 77.1 kg (170 lb)   04/26/21 77.1 kg (170 lb)   11/11/20 78 kg (172 lb)       General Appearance:   no distress, normal body habitus   ENT/Mouth: membranes moist, no oral lesions or bleeding gums.      EYES:  no scleral icterus, normal conjunctivae   Neck: no carotid bruits or thyromegaly   Chest/Lungs:   lungs are clear to auscultation, no rales or wheezing, well-healed device scar, equal chest wall expansion    Cardiovascular:    Distant, regular. Normal first and second heart sounds with soft systolic murmur, rubs, or gallops; the carotid, radial and posterior tibial pulses are intact, Jugular venous pressure within normal limits, no significant edema bilaterally    Abdomen:  no organomegaly, masses, bruits, or tenderness; bowel sounds are present   Extremities: no cyanosis or clubbing   Skin: no xanthelasma, warm.    Neurologic: normal  bilateral, no tremors     Psychiatric: alert and oriented x3, calm        Please refer above for cardiac ROS details.        Medical History  Surgical History Family History Social History   Past Medical History:   Diagnosis Date     DMITRI (acute kidney injury) (H)      CKD (chronic kidney disease)      Dyslipidemia, goal LDL below 70 4/16/2018     Mushroom poisoning 9/11/2006     Nonischemic cardiomyopathy (H)      Paroxysmal atrial fibrillation (H)      Persistent atrial fibrillation  (H) 4/12/2018     Past Surgical History:   Procedure Laterality Date     CHOLECYSTECTOMY       CV CORONARY ANGIOGRAM N/A 4/18/2018    Procedure: Coronary Angiogram;  Surgeon: Heriberto Cheung MD;  Location: [facility] Cath Lab;  Service:      CV CORONARY ANGIOGRAM N/A 4/20/2018    Procedure: Coronary Angiogram;  Surgeon: Javon Carrington MD;  Location: [facility] Cath Lab;  Service:      CV IMPELLA INSERT N/A 4/20/2018    Procedure: Impella Insert;  Surgeon: Javon Carrington MD;  Location: [facility] Cath Lab;  Service:      CV LEFT HEART CATHETERIZATION WITHOUT LEFT VENTRICULOGRAM Left 4/18/2018    Procedure: Left Heart Catheterization Without Left Ventriculogram;  Surgeon: Heriberto Cheung MD;  Location: [facility] Cath Lab;  Service:      EP ICD INSERT N/A 4/23/2018    Procedure: EP ICD Insert;  Surgeon: Rakesh Rivera MD;  Location: [facility] Cath Lab;  Service:      PICC  4/18/2018          No family history on file.     Social History     Socioeconomic History     Marital status:      Spouse name: Not on file     Number of children: Not on file     Years of education: Not on file     Highest education level: Not on file   Occupational History     Not on file   Tobacco Use     Smoking status: Never Smoker     Smokeless tobacco: Never Used   Substance and Sexual Activity     Alcohol use: Yes     Comment: Alcoholic Drinks/day: very rare      Drug use: No     Sexual activity: Not on file   Other Topics Concern     Not on file   Social History Narrative    The patient's son was present to provide the HPI.     Social Determinants of Health     Financial Resource Strain: Not on file   Food Insecurity: Not on file   Transportation Needs: Not on file   Physical Activity: Not on file   Stress: Not on file   Social Connections: Not on file   Intimate Partner Violence: Not on file   Housing Stability: Not on file           Medications  Allergies   Current Outpatient Medications   Medication  Sig Dispense Refill     acetaminophen (TYLENOL) 325 MG tablet [ACETAMINOPHEN (TYLENOL) 325 MG TABLET] Take 650 mg by mouth every 6 (six) hours as needed for pain.       allopurinol (ZYLOPRIM) 300 MG tablet [ALLOPURINOL (ZYLOPRIM) 300 MG TABLET] Take 300 mg by mouth daily.       atorvastatin (LIPITOR) 80 MG tablet [ATORVASTATIN (LIPITOR) 80 MG TABLET] Take 1 tablet (80 mg total) by mouth daily. 90 tablet 0     cetirizine (ZYRTEC) 10 MG tablet [CETIRIZINE (ZYRTEC) 10 MG TABLET] Take 1 tablet by mouth daily.  0     cholecalciferol, vitamin D3, (VITAMIN D3) 5,000 unit Tab [CHOLECALCIFEROL, VITAMIN D3, (VITAMIN D3) 5,000 UNIT TAB] Take 5,000 Units by mouth Daily at 5 pm. (Patient not taking: Reported on 12/13/2021)       clopidogrel (PLAVIX) 75 MG tablet TAKE 1 PILL (75 MG TOTAL) BY MOUTH DAILY /TXHUA HNUB NOJ 1 LUB [facility] PAB KOM NTSHAV STEFFANIE 90 tablet 1     diltiazem ER COATED BEADS (CARDIZEM CD/CARTIA XT) 180 MG 24 hr capsule TAKE 1 CAPSULE (180 MG TOTAL) BY MOUTH DAILY/ TXHUA HNUB NOJ 1 LUB [facility] PAB ZOO NTSHAV SIAB 90 capsule 1     fluticasone (VERAMYST) 27.5 mcg/actuation nasal spray Spray 1 spray in nostril nightly as needed        furosemide (LASIX) 20 MG tablet [FUROSEMIDE (LASIX) 20 MG TABLET] Take 1 tablet (20 mg total) by mouth daily. 90 tablet 0     hydrALAZINE (APRESOLINE) 25 MG tablet [HYDRALAZINE (APRESOLINE) 25 MG TABLET] TAKE 1 PILL (25 MG TOTAL) BY MOUTH 3 TIMES EVERYDAY/ [facility]J [facility] 270 tablet 2     levothyroxine (SYNTHROID) 88 MCG tablet [LEVOTHYROXINE (SYNTHROID) 88 MCG TABLET] Take 88 mcg by mouth Daily at 6:00 am. Indications: hypothyroidism       metoprolol succinate (TOPROL-XL) 100 MG 24 hr tablet [METOPROLOL SUCCINATE (TOPROL-XL) 100 MG 24 HR TABLET] TAKE 2 TABLETS (200 MG TOTAL) BY MOUTH DAILY/ TXHUA HNUB NOJ 2 LUB TSHUAJ PAB ZOO NTSHAV SIAB 180 tablet 1     nitroGLYcerin (NITROSTAT) 0.4 MG sublingual tablet DISSOLVE 1 PILL UNDER TONGUE EVERY 5 MINUTES AS NEEDED FOR CHEST  PAIN/YOG MOB HAUV SIAB MUAB IB LUB TSHUAJ  HAUV QAB NPLAIG TXHUA 5 DINA THIS 25 tablet 3     Vitamin D, Cholecalciferol, 25 MCG (1000 UT) CAPS Take 1 capsule by mouth daily       XARELTO 15 mg tablet [XARELTO 15 MG TABLET] TAKE 1 TABLET (15 MG TOTAL) BY MOUTH DAILY WITH SUPPER. 90 tablet 1     No Known Allergies       Lab Results    Chemistry/lipid CBC Cardiac Enzymes/BNP/TSH/INR   Recent Labs   Lab Test 07/20/21  1512   CHOL 167   HDL 36*   LDL 75   TRIG 553*     Recent Labs   Lab Test 07/20/21  1512 10/08/20  1353 08/17/18  1556   LDL 75 61 73     Recent Labs   Lab Test 07/30/21  1437      POTASSIUM 3.5   CHLORIDE 106   CO2 23      BUN 35*   CR 1.74*   GFRESTIMATED 40*   BOB 9.7     Recent Labs   Lab Test 07/30/21  1437 07/20/21  1512 10/08/20  1353   CR 1.74* 1.59* 1.59*     Recent Labs   Lab Test 04/20/18  1025   A1C 5.3          Recent Labs   Lab Test 02/19/20  1506   WBC 9.0   HGB 16.3   HCT 48.4   MCV 94        Recent Labs   Lab Test 02/19/20  1506 11/02/19  1049 05/16/18  2035   HGB 16.3 16.4 11.5*    Recent Labs   Lab Test 05/16/18  2035 04/17/18  2212 04/13/18  0246   TROPONINI <0.01 0.06 0.12     Recent Labs   Lab Test 02/19/20  1506 05/16/18  2035 04/17/18  2212   * 81* 335*     Recent Labs   Lab Test 07/20/21  1512   TSH 1.69     Recent Labs   Lab Test 11/02/19  1049 04/29/18  1139 04/26/18  1447   INR 1.47* 1.19* 1.79*        Parth Russo MD

## 2022-01-27 NOTE — PATIENT INSTRUCTIONS
We are going to plan a heart ultrasound and recheck of your cholesterol levels.My nurse is Viji and her number is [phone]

## 2022-02-01 ENCOUNTER — HOSPITAL ENCOUNTER (OUTPATIENT)
Dept: CARDIOLOGY | Facility: HOSPITAL | Age: 67
Discharge: HOME | End: 2022-02-01
Attending: INTERNAL MEDICINE | Admitting: INTERNAL MEDICINE
Payer: COMMERCIAL

## 2022-02-01 DIAGNOSIS — I25.83 CORONARY ARTERY DISEASE DUE TO LIPID RICH PLAQUE: ICD-10-CM

## 2022-02-01 DIAGNOSIS — I25.10 CORONARY ARTERY DISEASE DUE TO LIPID RICH PLAQUE: ICD-10-CM

## 2022-02-01 LAB — LVEF ECHO: NORMAL

## 2022-02-01 PROCEDURE — 93306 TTE W/DOPPLER COMPLETE: CPT

## 2022-02-01 PROCEDURE — 93306 TTE W/DOPPLER COMPLETE: CPT | Mod: 26 | Performed by: INTERNAL MEDICINE

## 2022-03-19 ENCOUNTER — HOSPITAL ENCOUNTER (EMERGENCY)
Facility: HOSPITAL | Age: 67
Discharge: HOME | End: 2022-03-19
Attending: EMERGENCY MEDICINE | Admitting: EMERGENCY MEDICINE
Payer: COMMERCIAL

## 2022-03-19 VITALS
DIASTOLIC BLOOD PRESSURE: 76 MMHG | BODY MASS INDEX: 29.18 KG/M2 | WEIGHT: 170 LBS | SYSTOLIC BLOOD PRESSURE: 116 MMHG | TEMPERATURE: 97.3 F | HEART RATE: 62 BPM | OXYGEN SATURATION: 96 % | RESPIRATION RATE: 16 BRPM

## 2022-03-19 DIAGNOSIS — K13.79 ORAL BLEEDING: ICD-10-CM

## 2022-03-19 PROCEDURE — 99283 EMERGENCY DEPT VISIT LOW MDM: CPT

## 2022-03-19 PROCEDURE — 250N000009 HC RX 250: Performed by: EMERGENCY MEDICINE

## 2022-03-19 RX ORDER — TRANEXAMIC ACID 100 MG/ML
500 INJECTION, SOLUTION INTRAVENOUS ONCE
Status: COMPLETED | OUTPATIENT
Start: 2022-03-19 | End: 2022-03-19

## 2022-03-19 RX ADMIN — TRANEXAMIC ACID 500 MG: 100 INJECTION, SOLUTION INTRAVENOUS at 09:37

## 2022-03-19 NOTE — ED PROVIDER NOTES
[facility] EMERGENCY DEPARTMENT  PHYSICIAN NOTE    MRN: [id number]    FINAL IMPRESSION     1. Oral bleeding          ED COURSE & MDM       9:23 AM Initial history and physical performed. Plan of care discussed. Phone  utilized. PPE utilized includes N95 mask, face shield,and gloves.     10:10 AM Patient reevaluated prior to discharge.     Patient presented for oral bleeding. Initial vital signs reassuring.  The amount of bleeding from the extraction site was very small, especially considering he has continued to take his Xarelto.  Topical TXA was applied using swish and spit, and he notes improvement in bleeding after this.  He is not having significant pain and I am not concerned for infection.  He has been holding his Plavix, so I advised him to continue holding this to avoid further antiplatelet effects until the bleeding is completely stopped.  Since he has had such significant improvement even while on Xarelto, the benefits of stopping this do not likely outweigh the risks so advised him to continue taking this.  Patient discharged in stable condition. Return precautions provided. All questions answered.  ===================================================================    HPI     Dillon Pennington is a 67 year old male with relevant PMH significant for hypertension and atrial fibrillation presenting with oral bleeding. Patient states he had a tooth pulled yesterday and is not bleeding a lot but has had constant bleeding since. He stopped his plavix before having it pulled but kept taking his Xarelto. Has not taken the pain medication he was given. No pain currently. His bleeding continues while at night and is hard to sleep. Denies trouble breathing or swallowing.     ROS  See HPI.    Problem list, medications, allergies, PMH, PSH, family history, and social history reviewed and updated as able in Epic.      PHYSICAL EXAM     Vitals:    03/19/22 0915 03/19/22 0930 03/19/22  0945 03/19/22 1000   BP: 125/85 126/89 120/76 116/76   Pulse:  64 62 62   Resp:       Temp:       TempSrc:       SpO2:  99% 98% 96%   Weight:            Constitutional: Alert, no acute distress.  HENT: Right lower molar extraction bed with overlying clot. Minimal blood oozing from underneath.   Eyes: Sclera anicteric, EOMI.  Pulm: Non-labored respirations.  Neuro: A/O x3. Normal speech. No focal deficits.  Skin: Warm and dry, no rash.  Psych: Cooperative, able to follow commands. Intact attention.      TESTING   All testing reviewed and interpreted.    EKG  None    LABS  Labs Ordered and Resulted from Time of ED Arrival to Time of ED Departure - No data to display    IMAGING  No orders to display         I attest that Aki Donnelly is acting in a scribe capacity, has observed my performance of services, and has documented them in accordance with my direction.       Gianfranco Price MD  03/19/22 9801

## 2022-03-19 NOTE — ED TRIAGE NOTES
Patient presents with continued oral bleeding following a tooth extraction that was performed yesterday. He did take his  Xaralto due to atrial fibrillation. He stopped taking clopidogrel. He did stop taking his blood thinner two days before the procedure.

## 2022-03-19 NOTE — DISCHARGE INSTRUCTIONS
If the bleeding starts again, try calling the dentist first since they may be able to take care of it without you needing to come back to the emergency department.  Try to avoid chewing on the right side until this heals.  Do not take the Plavix until the bleeding has fully stopped, but you can keep taking the Xarelto.

## 2022-03-19 NOTE — ED NOTES
Patient states he had tooth removed yesterday - was told to stop plavix but did continue Xarelto. C/O oral bleeding. Bleeding is constant - has been soaking gauze. Denies pain at time of assessment. Denies trouble breathing or swallowing.

## 2022-03-24 ENCOUNTER — ANCILLARY PROCEDURE (OUTPATIENT)
Dept: CARDIOLOGY | Facility: CLINIC | Age: 67
End: 2022-03-24
Attending: INTERNAL MEDICINE
Payer: COMMERCIAL

## 2022-03-24 DIAGNOSIS — Z95.810 ICD (IMPLANTABLE CARDIOVERTER-DEFIBRILLATOR) IN PLACE: ICD-10-CM

## 2022-03-24 DIAGNOSIS — I49.01 VF (VENTRICULAR FIBRILLATION) (H): ICD-10-CM

## 2022-03-24 LAB
MDC_IDC_EPISODE_DTM: NORMAL
MDC_IDC_EPISODE_DURATION: 11 S
MDC_IDC_EPISODE_DURATION: 12 S
MDC_IDC_EPISODE_DURATION: 13 S
MDC_IDC_EPISODE_DURATION: 14 S
MDC_IDC_EPISODE_DURATION: 16 S
MDC_IDC_EPISODE_DURATION: 23 S
MDC_IDC_EPISODE_DURATION: 24 S
MDC_IDC_EPISODE_DURATION: 26 S
MDC_IDC_EPISODE_DURATION: 52 S
MDC_IDC_EPISODE_DURATION: 67 S
MDC_IDC_EPISODE_ID: NORMAL
MDC_IDC_EPISODE_TYPE: NORMAL
MDC_IDC_EPISODE_VENDOR_TYPE: NORMAL
MDC_IDC_LEAD_IMPLANT_DT: NORMAL
MDC_IDC_LEAD_IMPLANT_DT: NORMAL
MDC_IDC_LEAD_LOCATION: NORMAL
MDC_IDC_LEAD_LOCATION: NORMAL
MDC_IDC_LEAD_LOCATION_DETAIL_1: NORMAL
MDC_IDC_LEAD_LOCATION_DETAIL_1: NORMAL
MDC_IDC_LEAD_MFG: NORMAL
MDC_IDC_LEAD_MFG: NORMAL
MDC_IDC_LEAD_MODEL: NORMAL
MDC_IDC_LEAD_MODEL: NORMAL
MDC_IDC_LEAD_POLARITY_TYPE: NORMAL
MDC_IDC_LEAD_POLARITY_TYPE: NORMAL
MDC_IDC_LEAD_SERIAL: NORMAL
MDC_IDC_LEAD_SERIAL: NORMAL
MDC_IDC_MSMT_BATTERY_DTM: NORMAL
MDC_IDC_MSMT_BATTERY_REMAINING_LONGEVITY: 78 MO
MDC_IDC_MSMT_BATTERY_REMAINING_PERCENTAGE: 91 %
MDC_IDC_MSMT_BATTERY_STATUS: NORMAL
MDC_IDC_MSMT_CAP_CHARGE_DTM: NORMAL
MDC_IDC_MSMT_CAP_CHARGE_TIME: 10.8 S
MDC_IDC_MSMT_CAP_CHARGE_TYPE: NORMAL
MDC_IDC_MSMT_LEADCHNL_RA_IMPEDANCE_VALUE: 643 OHM
MDC_IDC_MSMT_LEADCHNL_RA_PACING_THRESHOLD_AMPLITUDE: 1 V
MDC_IDC_MSMT_LEADCHNL_RA_PACING_THRESHOLD_PULSEWIDTH: 0.4 MS
MDC_IDC_MSMT_LEADCHNL_RV_IMPEDANCE_VALUE: 685 OHM
MDC_IDC_MSMT_LEADCHNL_RV_PACING_THRESHOLD_AMPLITUDE: 0.7 V
MDC_IDC_MSMT_LEADCHNL_RV_PACING_THRESHOLD_PULSEWIDTH: 0.4 MS
MDC_IDC_PG_IMPLANT_DTM: NORMAL
MDC_IDC_PG_MFG: NORMAL
MDC_IDC_PG_MODEL: NORMAL
MDC_IDC_PG_SERIAL: NORMAL
MDC_IDC_PG_TYPE: NORMAL
MDC_IDC_SESS_CLINIC_NAME: NORMAL
MDC_IDC_SESS_DTM: NORMAL
MDC_IDC_SESS_TYPE: NORMAL
MDC_IDC_SET_BRADY_AT_MODE_SWITCH_RATE: 160 {BEATS}/MIN
MDC_IDC_SET_BRADY_LOWRATE: 60 {BEATS}/MIN
MDC_IDC_SET_BRADY_MAX_SENSOR_RATE: 120 {BEATS}/MIN
MDC_IDC_SET_BRADY_MODE: NORMAL
MDC_IDC_SET_LEADCHNL_RA_PACING_POLARITY: NORMAL
MDC_IDC_SET_LEADCHNL_RA_SENSING_ADAPTATION_MODE: NORMAL
MDC_IDC_SET_LEADCHNL_RA_SENSING_POLARITY: NORMAL
MDC_IDC_SET_LEADCHNL_RA_SENSING_SENSITIVITY: 0.15 MV
MDC_IDC_SET_LEADCHNL_RV_PACING_AMPLITUDE: 1.5 V
MDC_IDC_SET_LEADCHNL_RV_PACING_POLARITY: NORMAL
MDC_IDC_SET_LEADCHNL_RV_PACING_PULSEWIDTH: 0.4 MS
MDC_IDC_SET_LEADCHNL_RV_SENSING_ADAPTATION_MODE: NORMAL
MDC_IDC_SET_LEADCHNL_RV_SENSING_POLARITY: NORMAL
MDC_IDC_SET_LEADCHNL_RV_SENSING_SENSITIVITY: 0.6 MV
MDC_IDC_SET_ZONE_DETECTION_INTERVAL: 250 MS
MDC_IDC_SET_ZONE_DETECTION_INTERVAL: 324 MS
MDC_IDC_SET_ZONE_TYPE: NORMAL
MDC_IDC_SET_ZONE_TYPE: NORMAL
MDC_IDC_SET_ZONE_VENDOR_TYPE: NORMAL
MDC_IDC_SET_ZONE_VENDOR_TYPE: NORMAL
MDC_IDC_STAT_BRADY_DTM_END: NORMAL
MDC_IDC_STAT_BRADY_DTM_START: NORMAL
MDC_IDC_STAT_BRADY_RA_PERCENT_PACED: 0 %
MDC_IDC_STAT_BRADY_RV_PERCENT_PACED: 36 %
MDC_IDC_STAT_EPISODE_RECENT_COUNT: 0
MDC_IDC_STAT_EPISODE_RECENT_COUNT: 10
MDC_IDC_STAT_EPISODE_RECENT_COUNT: 9
MDC_IDC_STAT_EPISODE_RECENT_COUNT_DTM_END: NORMAL
MDC_IDC_STAT_EPISODE_RECENT_COUNT_DTM_START: NORMAL
MDC_IDC_STAT_EPISODE_TYPE: NORMAL
MDC_IDC_STAT_EPISODE_VENDOR_TYPE: NORMAL
MDC_IDC_STAT_TACHYTHERAPY_ATP_DELIVERED_RECENT: 0
MDC_IDC_STAT_TACHYTHERAPY_ATP_DELIVERED_TOTAL: 3
MDC_IDC_STAT_TACHYTHERAPY_RECENT_DTM_END: NORMAL
MDC_IDC_STAT_TACHYTHERAPY_RECENT_DTM_START: NORMAL
MDC_IDC_STAT_TACHYTHERAPY_SHOCKS_ABORTED_RECENT: 0
MDC_IDC_STAT_TACHYTHERAPY_SHOCKS_ABORTED_TOTAL: 0
MDC_IDC_STAT_TACHYTHERAPY_SHOCKS_DELIVERED_RECENT: 0
MDC_IDC_STAT_TACHYTHERAPY_SHOCKS_DELIVERED_TOTAL: 0
MDC_IDC_STAT_TACHYTHERAPY_TOTAL_DTM_END: NORMAL
MDC_IDC_STAT_TACHYTHERAPY_TOTAL_DTM_START: NORMAL

## 2022-03-24 PROCEDURE — 93295 DEV INTERROG REMOTE 1/2/MLT: CPT | Performed by: INTERNAL MEDICINE

## 2022-03-24 PROCEDURE — 93296 REM INTERROG EVL PM/IDS: CPT | Performed by: INTERNAL MEDICINE

## 2022-07-07 ENCOUNTER — ANCILLARY PROCEDURE (OUTPATIENT)
Dept: CARDIOLOGY | Facility: CLINIC | Age: 67
End: 2022-07-07
Attending: INTERNAL MEDICINE
Payer: COMMERCIAL

## 2022-07-07 DIAGNOSIS — I49.01 VF (VENTRICULAR FIBRILLATION) (H): ICD-10-CM

## 2022-07-07 DIAGNOSIS — Z95.810 ICD (IMPLANTABLE CARDIOVERTER-DEFIBRILLATOR) IN PLACE: ICD-10-CM

## 2022-07-07 LAB
MDC_IDC_EPISODE_DTM: NORMAL
MDC_IDC_EPISODE_DURATION: 11 S
MDC_IDC_EPISODE_DURATION: 12 S
MDC_IDC_EPISODE_DURATION: 12 S
MDC_IDC_EPISODE_DURATION: 13 S
MDC_IDC_EPISODE_DURATION: 14 S
MDC_IDC_EPISODE_DURATION: 14 S
MDC_IDC_EPISODE_DURATION: 17 S
MDC_IDC_EPISODE_DURATION: 22 S
MDC_IDC_EPISODE_DURATION: 27 S
MDC_IDC_EPISODE_DURATION: 36 S
MDC_IDC_EPISODE_ID: NORMAL
MDC_IDC_EPISODE_TYPE: NORMAL
MDC_IDC_EPISODE_VENDOR_TYPE: NORMAL
MDC_IDC_LEAD_IMPLANT_DT: NORMAL
MDC_IDC_LEAD_IMPLANT_DT: NORMAL
MDC_IDC_LEAD_LOCATION: NORMAL
MDC_IDC_LEAD_LOCATION: NORMAL
MDC_IDC_LEAD_LOCATION_DETAIL_1: NORMAL
MDC_IDC_LEAD_LOCATION_DETAIL_1: NORMAL
MDC_IDC_LEAD_MFG: NORMAL
MDC_IDC_LEAD_MFG: NORMAL
MDC_IDC_LEAD_MODEL: NORMAL
MDC_IDC_LEAD_MODEL: NORMAL
MDC_IDC_LEAD_POLARITY_TYPE: NORMAL
MDC_IDC_LEAD_POLARITY_TYPE: NORMAL
MDC_IDC_LEAD_SERIAL: NORMAL
MDC_IDC_LEAD_SERIAL: NORMAL
MDC_IDC_MSMT_BATTERY_DTM: NORMAL
MDC_IDC_MSMT_BATTERY_REMAINING_LONGEVITY: 78 MO
MDC_IDC_MSMT_BATTERY_REMAINING_PERCENTAGE: 90 %
MDC_IDC_MSMT_BATTERY_STATUS: NORMAL
MDC_IDC_MSMT_CAP_CHARGE_DTM: NORMAL
MDC_IDC_MSMT_CAP_CHARGE_TIME: 10.8 S
MDC_IDC_MSMT_CAP_CHARGE_TYPE: NORMAL
MDC_IDC_MSMT_LEADCHNL_RA_IMPEDANCE_VALUE: 678 OHM
MDC_IDC_MSMT_LEADCHNL_RA_PACING_THRESHOLD_AMPLITUDE: 1 V
MDC_IDC_MSMT_LEADCHNL_RA_PACING_THRESHOLD_PULSEWIDTH: 0.4 MS
MDC_IDC_MSMT_LEADCHNL_RV_IMPEDANCE_VALUE: 727 OHM
MDC_IDC_MSMT_LEADCHNL_RV_PACING_THRESHOLD_AMPLITUDE: 0.7 V
MDC_IDC_MSMT_LEADCHNL_RV_PACING_THRESHOLD_PULSEWIDTH: 0.4 MS
MDC_IDC_PG_IMPLANT_DTM: NORMAL
MDC_IDC_PG_MFG: NORMAL
MDC_IDC_PG_MODEL: NORMAL
MDC_IDC_PG_SERIAL: NORMAL
MDC_IDC_PG_TYPE: NORMAL
MDC_IDC_SESS_CLINIC_NAME: NORMAL
MDC_IDC_SESS_DTM: NORMAL
MDC_IDC_SESS_TYPE: NORMAL
MDC_IDC_SET_BRADY_AT_MODE_SWITCH_RATE: 160 {BEATS}/MIN
MDC_IDC_SET_BRADY_LOWRATE: 60 {BEATS}/MIN
MDC_IDC_SET_BRADY_MAX_SENSOR_RATE: 120 {BEATS}/MIN
MDC_IDC_SET_BRADY_MODE: NORMAL
MDC_IDC_SET_LEADCHNL_RA_PACING_POLARITY: NORMAL
MDC_IDC_SET_LEADCHNL_RA_SENSING_ADAPTATION_MODE: NORMAL
MDC_IDC_SET_LEADCHNL_RA_SENSING_POLARITY: NORMAL
MDC_IDC_SET_LEADCHNL_RA_SENSING_SENSITIVITY: 0.15 MV
MDC_IDC_SET_LEADCHNL_RV_PACING_AMPLITUDE: 1.5 V
MDC_IDC_SET_LEADCHNL_RV_PACING_POLARITY: NORMAL
MDC_IDC_SET_LEADCHNL_RV_PACING_PULSEWIDTH: 0.4 MS
MDC_IDC_SET_LEADCHNL_RV_SENSING_ADAPTATION_MODE: NORMAL
MDC_IDC_SET_LEADCHNL_RV_SENSING_POLARITY: NORMAL
MDC_IDC_SET_LEADCHNL_RV_SENSING_SENSITIVITY: 0.6 MV
MDC_IDC_SET_ZONE_DETECTION_INTERVAL: 250 MS
MDC_IDC_SET_ZONE_DETECTION_INTERVAL: 324 MS
MDC_IDC_SET_ZONE_TYPE: NORMAL
MDC_IDC_SET_ZONE_TYPE: NORMAL
MDC_IDC_SET_ZONE_VENDOR_TYPE: NORMAL
MDC_IDC_SET_ZONE_VENDOR_TYPE: NORMAL
MDC_IDC_STAT_BRADY_DTM_END: NORMAL
MDC_IDC_STAT_BRADY_DTM_START: NORMAL
MDC_IDC_STAT_BRADY_RA_PERCENT_PACED: 0 %
MDC_IDC_STAT_BRADY_RV_PERCENT_PACED: 38 %
MDC_IDC_STAT_EPISODE_RECENT_COUNT: 0
MDC_IDC_STAT_EPISODE_RECENT_COUNT: 12
MDC_IDC_STAT_EPISODE_RECENT_COUNT: 7
MDC_IDC_STAT_EPISODE_RECENT_COUNT_DTM_END: NORMAL
MDC_IDC_STAT_EPISODE_RECENT_COUNT_DTM_START: NORMAL
MDC_IDC_STAT_EPISODE_TYPE: NORMAL
MDC_IDC_STAT_EPISODE_VENDOR_TYPE: NORMAL
MDC_IDC_STAT_TACHYTHERAPY_ATP_DELIVERED_RECENT: 0
MDC_IDC_STAT_TACHYTHERAPY_ATP_DELIVERED_TOTAL: 3
MDC_IDC_STAT_TACHYTHERAPY_RECENT_DTM_END: NORMAL
MDC_IDC_STAT_TACHYTHERAPY_RECENT_DTM_START: NORMAL
MDC_IDC_STAT_TACHYTHERAPY_SHOCKS_ABORTED_RECENT: 0
MDC_IDC_STAT_TACHYTHERAPY_SHOCKS_ABORTED_TOTAL: 0
MDC_IDC_STAT_TACHYTHERAPY_SHOCKS_DELIVERED_RECENT: 0
MDC_IDC_STAT_TACHYTHERAPY_SHOCKS_DELIVERED_TOTAL: 0
MDC_IDC_STAT_TACHYTHERAPY_TOTAL_DTM_END: NORMAL
MDC_IDC_STAT_TACHYTHERAPY_TOTAL_DTM_START: NORMAL

## 2022-07-07 PROCEDURE — 93296 REM INTERROG EVL PM/IDS: CPT | Performed by: INTERNAL MEDICINE

## 2022-07-07 PROCEDURE — 93295 DEV INTERROG REMOTE 1/2/MLT: CPT | Performed by: INTERNAL MEDICINE

## 2022-10-17 ENCOUNTER — ANCILLARY PROCEDURE (OUTPATIENT)
Dept: CARDIOLOGY | Facility: CLINIC | Age: 67
End: 2022-10-17
Payer: COMMERCIAL

## 2022-10-17 DIAGNOSIS — Z95.810 ICD (IMPLANTABLE CARDIOVERTER-DEFIBRILLATOR) IN PLACE: ICD-10-CM

## 2022-10-17 DIAGNOSIS — I49.01 VF (VENTRICULAR FIBRILLATION) (H): ICD-10-CM

## 2022-10-17 LAB
MDC_IDC_EPISODE_DTM: NORMAL
MDC_IDC_EPISODE_DURATION: 11 S
MDC_IDC_EPISODE_DURATION: 11 S
MDC_IDC_EPISODE_DURATION: 12 S
MDC_IDC_EPISODE_DURATION: 13 S
MDC_IDC_EPISODE_DURATION: 14 S
MDC_IDC_EPISODE_DURATION: 15 S
MDC_IDC_EPISODE_DURATION: 15 S
MDC_IDC_EPISODE_DURATION: 20 S
MDC_IDC_EPISODE_DURATION: 21 S
MDC_IDC_EPISODE_DURATION: 21 S
MDC_IDC_EPISODE_DURATION: 22 S
MDC_IDC_EPISODE_DURATION: 23 S
MDC_IDC_EPISODE_DURATION: 23 S
MDC_IDC_EPISODE_DURATION: 29 S
MDC_IDC_EPISODE_DURATION: 31 S
MDC_IDC_EPISODE_DURATION: 47 S
MDC_IDC_EPISODE_ID: NORMAL
MDC_IDC_EPISODE_TYPE: NORMAL
MDC_IDC_EPISODE_VENDOR_TYPE: NORMAL
MDC_IDC_LEAD_IMPLANT_DT: NORMAL
MDC_IDC_LEAD_IMPLANT_DT: NORMAL
MDC_IDC_LEAD_LOCATION: NORMAL
MDC_IDC_LEAD_LOCATION: NORMAL
MDC_IDC_LEAD_LOCATION_DETAIL_1: NORMAL
MDC_IDC_LEAD_LOCATION_DETAIL_1: NORMAL
MDC_IDC_LEAD_MFG: NORMAL
MDC_IDC_LEAD_MFG: NORMAL
MDC_IDC_LEAD_MODEL: NORMAL
MDC_IDC_LEAD_MODEL: NORMAL
MDC_IDC_LEAD_POLARITY_TYPE: NORMAL
MDC_IDC_LEAD_POLARITY_TYPE: NORMAL
MDC_IDC_LEAD_SERIAL: NORMAL
MDC_IDC_LEAD_SERIAL: NORMAL
MDC_IDC_MSMT_BATTERY_DTM: NORMAL
MDC_IDC_MSMT_BATTERY_REMAINING_LONGEVITY: 72 MO
MDC_IDC_MSMT_BATTERY_REMAINING_PERCENTAGE: 84 %
MDC_IDC_MSMT_BATTERY_STATUS: NORMAL
MDC_IDC_MSMT_CAP_CHARGE_DTM: NORMAL
MDC_IDC_MSMT_CAP_CHARGE_TIME: 10.9 S
MDC_IDC_MSMT_CAP_CHARGE_TYPE: NORMAL
MDC_IDC_MSMT_LEADCHNL_RA_IMPEDANCE_VALUE: 653 OHM
MDC_IDC_MSMT_LEADCHNL_RA_PACING_THRESHOLD_AMPLITUDE: 1 V
MDC_IDC_MSMT_LEADCHNL_RA_PACING_THRESHOLD_PULSEWIDTH: 0.4 MS
MDC_IDC_MSMT_LEADCHNL_RV_IMPEDANCE_VALUE: 714 OHM
MDC_IDC_MSMT_LEADCHNL_RV_PACING_THRESHOLD_AMPLITUDE: 0.7 V
MDC_IDC_MSMT_LEADCHNL_RV_PACING_THRESHOLD_PULSEWIDTH: 0.4 MS
MDC_IDC_PG_IMPLANT_DTM: NORMAL
MDC_IDC_PG_MFG: NORMAL
MDC_IDC_PG_MODEL: NORMAL
MDC_IDC_PG_SERIAL: NORMAL
MDC_IDC_PG_TYPE: NORMAL
MDC_IDC_SESS_CLINIC_NAME: NORMAL
MDC_IDC_SESS_DTM: NORMAL
MDC_IDC_SESS_TYPE: NORMAL
MDC_IDC_SET_BRADY_AT_MODE_SWITCH_RATE: 160 {BEATS}/MIN
MDC_IDC_SET_BRADY_LOWRATE: 60 {BEATS}/MIN
MDC_IDC_SET_BRADY_MAX_SENSOR_RATE: 120 {BEATS}/MIN
MDC_IDC_SET_BRADY_MODE: NORMAL
MDC_IDC_SET_LEADCHNL_RA_PACING_POLARITY: NORMAL
MDC_IDC_SET_LEADCHNL_RA_SENSING_ADAPTATION_MODE: NORMAL
MDC_IDC_SET_LEADCHNL_RA_SENSING_POLARITY: NORMAL
MDC_IDC_SET_LEADCHNL_RA_SENSING_SENSITIVITY: 0.15 MV
MDC_IDC_SET_LEADCHNL_RV_PACING_AMPLITUDE: 1.5 V
MDC_IDC_SET_LEADCHNL_RV_PACING_POLARITY: NORMAL
MDC_IDC_SET_LEADCHNL_RV_PACING_PULSEWIDTH: 0.4 MS
MDC_IDC_SET_LEADCHNL_RV_SENSING_ADAPTATION_MODE: NORMAL
MDC_IDC_SET_LEADCHNL_RV_SENSING_POLARITY: NORMAL
MDC_IDC_SET_LEADCHNL_RV_SENSING_SENSITIVITY: 0.6 MV
MDC_IDC_SET_ZONE_DETECTION_INTERVAL: 250 MS
MDC_IDC_SET_ZONE_DETECTION_INTERVAL: 324 MS
MDC_IDC_SET_ZONE_TYPE: NORMAL
MDC_IDC_SET_ZONE_TYPE: NORMAL
MDC_IDC_SET_ZONE_VENDOR_TYPE: NORMAL
MDC_IDC_SET_ZONE_VENDOR_TYPE: NORMAL
MDC_IDC_STAT_BRADY_DTM_END: NORMAL
MDC_IDC_STAT_BRADY_DTM_START: NORMAL
MDC_IDC_STAT_BRADY_RA_PERCENT_PACED: 0 %
MDC_IDC_STAT_BRADY_RV_PERCENT_PACED: 38 %
MDC_IDC_STAT_EPISODE_RECENT_COUNT: 0
MDC_IDC_STAT_EPISODE_RECENT_COUNT: 105
MDC_IDC_STAT_EPISODE_RECENT_COUNT: 95
MDC_IDC_STAT_EPISODE_RECENT_COUNT_DTM_END: NORMAL
MDC_IDC_STAT_EPISODE_RECENT_COUNT_DTM_START: NORMAL
MDC_IDC_STAT_EPISODE_TYPE: NORMAL
MDC_IDC_STAT_EPISODE_VENDOR_TYPE: NORMAL
MDC_IDC_STAT_TACHYTHERAPY_ATP_DELIVERED_RECENT: 0
MDC_IDC_STAT_TACHYTHERAPY_ATP_DELIVERED_TOTAL: 3
MDC_IDC_STAT_TACHYTHERAPY_RECENT_DTM_END: NORMAL
MDC_IDC_STAT_TACHYTHERAPY_RECENT_DTM_START: NORMAL
MDC_IDC_STAT_TACHYTHERAPY_SHOCKS_ABORTED_RECENT: 0
MDC_IDC_STAT_TACHYTHERAPY_SHOCKS_ABORTED_TOTAL: 0
MDC_IDC_STAT_TACHYTHERAPY_SHOCKS_DELIVERED_RECENT: 0
MDC_IDC_STAT_TACHYTHERAPY_SHOCKS_DELIVERED_TOTAL: 0
MDC_IDC_STAT_TACHYTHERAPY_TOTAL_DTM_END: NORMAL
MDC_IDC_STAT_TACHYTHERAPY_TOTAL_DTM_START: NORMAL

## 2022-10-17 PROCEDURE — 93295 DEV INTERROG REMOTE 1/2/MLT: CPT | Performed by: INTERNAL MEDICINE

## 2022-10-17 PROCEDURE — 93296 REM INTERROG EVL PM/IDS: CPT | Performed by: INTERNAL MEDICINE

## 2022-11-08 ENCOUNTER — NURSE TRIAGE (OUTPATIENT)
Dept: NURSING | Facility: CLINIC | Age: 67
End: 2022-11-08

## 2022-11-08 NOTE — TELEPHONE ENCOUNTER
S-(situation): Call from patient's daughter w/ patient.    Patient is having a nose bleed. He is on clopidogrel and Xarelto.    Bernard says they have put direct pressure on it for min of 15 min and it did not stop bleeding.  She reports putting pressure on it again with no change.    B-(background):       A-(assessment): needs to be evaluation/treated      R-(recommendations): Go to ED/.  Call [facility] who recommended patient go to ED instead.    Reviewed care advice with caller per RN triage protocol guideline.  Advised to call back with worsening symptoms, concerns or questions.   Caller verbalized understanding.          Jamarcus Farah RN/[facility]      Reason for Disposition    Bleeding present > 30 minutes and using correct method of direct pressure    Additional Information    Negative: Fainted (passed out), or too weak to stand following large blood loss    Negative: Sounds like a life-threatening emergency to the triager    Protocols used: NOSEBLEED-A-[place]

## 2022-11-10 ENCOUNTER — TRANSFERRED RECORDS (OUTPATIENT)
Dept: HEALTH INFORMATION MANAGEMENT | Facility: CLINIC | Age: 67
End: 2022-11-10

## 2022-11-10 ENCOUNTER — LAB REQUISITION (OUTPATIENT)
Dept: LAB | Facility: CLINIC | Age: 67
End: 2022-11-10
Payer: COMMERCIAL

## 2022-11-10 DIAGNOSIS — D64.9 ANEMIA, UNSPECIFIED: ICD-10-CM

## 2022-11-10 DIAGNOSIS — I10 ESSENTIAL (PRIMARY) HYPERTENSION: ICD-10-CM

## 2022-11-10 DIAGNOSIS — E55.9 VITAMIN D DEFICIENCY, UNSPECIFIED: ICD-10-CM

## 2022-11-10 DIAGNOSIS — E03.9 HYPOTHYROIDISM, UNSPECIFIED: ICD-10-CM

## 2022-11-10 DIAGNOSIS — Z87.39 PERSONAL HISTORY OF OTHER DISEASES OF THE MUSCULOSKELETAL SYSTEM AND CONNECTIVE TISSUE: ICD-10-CM

## 2022-11-10 LAB
ERYTHROCYTE [DISTWIDTH] IN BLOOD BY AUTOMATED COUNT: 13.9 % (ref 10–15)
HCT VFR BLD AUTO: 49.1 % (ref 40–53)
HGB BLD-MCNC: 16.6 G/DL (ref 13.3–17.7)
MCH RBC QN AUTO: 31.8 PG (ref 26.5–33)
MCHC RBC AUTO-ENTMCNC: 33.8 G/DL (ref 31.5–36.5)
MCV RBC AUTO: 94 FL (ref 78–100)
PLATELET # BLD AUTO: 225 10E3/UL (ref 150–450)
RBC # BLD AUTO: 5.22 10E6/UL (ref 4.4–5.9)
WBC # BLD AUTO: 7.7 10E3/UL (ref 4–11)

## 2022-11-10 PROCEDURE — 80053 COMPREHEN METABOLIC PANEL: CPT | Mod: ORL | Performed by: NURSE PRACTITIONER

## 2022-11-10 PROCEDURE — 82306 VITAMIN D 25 HYDROXY: CPT | Mod: ORL | Performed by: NURSE PRACTITIONER

## 2022-11-10 PROCEDURE — 84443 ASSAY THYROID STIM HORMONE: CPT | Performed by: NURSE PRACTITIONER

## 2022-11-10 PROCEDURE — 85027 COMPLETE CBC AUTOMATED: CPT | Mod: ORL | Performed by: NURSE PRACTITIONER

## 2022-11-10 PROCEDURE — 84439 ASSAY OF FREE THYROXINE: CPT | Performed by: NURSE PRACTITIONER

## 2022-11-10 PROCEDURE — 84550 ASSAY OF BLOOD/URIC ACID: CPT | Mod: ORL | Performed by: NURSE PRACTITIONER

## 2022-11-10 PROCEDURE — 80061 LIPID PANEL: CPT | Mod: ORL | Performed by: NURSE PRACTITIONER

## 2022-11-11 LAB
ALBUMIN SERPL BCG-MCNC: 3.9 G/DL (ref 3.5–5.2)
ALP SERPL-CCNC: 127 U/L (ref 40–129)
ALT SERPL W P-5'-P-CCNC: 21 U/L (ref 10–50)
ANION GAP SERPL CALCULATED.3IONS-SCNC: 19 MMOL/L (ref 7–15)
AST SERPL W P-5'-P-CCNC: 34 U/L (ref 10–50)
BILIRUB SERPL-MCNC: 0.7 MG/DL
BUN SERPL-MCNC: 29.4 MG/DL (ref 8–23)
CALCIUM SERPL-MCNC: 9.2 MG/DL (ref 8.8–10.2)
CHLORIDE SERPL-SCNC: 108 MMOL/L (ref 98–107)
CHOLEST SERPL-MCNC: 168 MG/DL
CREAT SERPL-MCNC: 2.03 MG/DL (ref 0.67–1.17)
DEPRECATED CALCIDIOL+CALCIFEROL SERPL-MC: 40 UG/L (ref 20–75)
DEPRECATED HCO3 PLAS-SCNC: 22 MMOL/L (ref 22–29)
GFR SERPL CREATININE-BSD FRML MDRD: 35 ML/MIN/1.73M2
GLUCOSE SERPL-MCNC: 101 MG/DL (ref 70–99)
HDLC SERPL-MCNC: 44 MG/DL
LDLC SERPL CALC-MCNC: 55 MG/DL
NONHDLC SERPL-MCNC: 124 MG/DL
POTASSIUM SERPL-SCNC: 3.6 MMOL/L (ref 3.4–5.3)
PROT SERPL-MCNC: 7 G/DL (ref 6.4–8.3)
SODIUM SERPL-SCNC: 149 MMOL/L (ref 136–145)
T4 FREE SERPL-MCNC: 1.8 NG/DL (ref 0.9–1.7)
TRIGL SERPL-MCNC: 344 MG/DL
TSH SERPL DL<=0.005 MIU/L-ACNC: 2.04 UIU/ML (ref 0.3–4.2)
URATE SERPL-MCNC: 5.7 MG/DL (ref 3.4–7)

## 2023-01-19 DIAGNOSIS — I49.01 VENTRICULAR FIBRILLATION (H): ICD-10-CM

## 2023-01-19 DIAGNOSIS — Z95.810 ICD (IMPLANTABLE CARDIOVERTER-DEFIBRILLATOR) IN PLACE: Primary | ICD-10-CM

## 2023-01-25 DIAGNOSIS — I42.0 DILATED CARDIOMYOPATHY (H): Primary | Chronic | ICD-10-CM

## 2023-01-25 DIAGNOSIS — I48.21 PERMANENT ATRIAL FIBRILLATION (H): Primary | ICD-10-CM

## 2023-04-06 ENCOUNTER — ANCILLARY ORDERS (OUTPATIENT)
Dept: CARDIOLOGY | Facility: CLINIC | Age: 68
End: 2023-04-06

## 2023-04-06 DIAGNOSIS — Z95.810 ICD (IMPLANTABLE CARDIOVERTER-DEFIBRILLATOR) IN PLACE: ICD-10-CM

## 2023-04-06 DIAGNOSIS — I49.01 VENTRICULAR FIBRILLATION (H): ICD-10-CM

## 2023-04-06 DIAGNOSIS — I42.0 DILATED CARDIOMYOPATHY (H): ICD-10-CM

## 2023-04-21 ENCOUNTER — OFFICE VISIT (OUTPATIENT)
Dept: CARDIOLOGY | Facility: CLINIC | Age: 68
End: 2023-04-21
Attending: INTERNAL MEDICINE
Payer: COMMERCIAL

## 2023-04-21 VITALS
DIASTOLIC BLOOD PRESSURE: 78 MMHG | BODY MASS INDEX: 30.07 KG/M2 | HEART RATE: 66 BPM | SYSTOLIC BLOOD PRESSURE: 123 MMHG | RESPIRATION RATE: 16 BRPM | WEIGHT: 175.2 LBS

## 2023-04-21 DIAGNOSIS — I49.01 VENTRICULAR FIBRILLATION (H): ICD-10-CM

## 2023-04-21 DIAGNOSIS — I48.19 PERSISTENT ATRIAL FIBRILLATION (H): ICD-10-CM

## 2023-04-21 DIAGNOSIS — Z13.6 ENCOUNTER FOR ABDOMINAL AORTIC ANEURYSM (AAA) SCREENING: ICD-10-CM

## 2023-04-21 DIAGNOSIS — Z95.810 ICD (IMPLANTABLE CARDIOVERTER-DEFIBRILLATOR) IN PLACE: ICD-10-CM

## 2023-04-21 DIAGNOSIS — I42.0 DILATED CARDIOMYOPATHY (H): Primary | ICD-10-CM

## 2023-04-21 DIAGNOSIS — I50.32 CHRONIC DIASTOLIC CONGESTIVE HEART FAILURE (H): ICD-10-CM

## 2023-04-21 DIAGNOSIS — I25.10 CORONARY ARTERY DISEASE DUE TO LIPID RICH PLAQUE: Primary | ICD-10-CM

## 2023-04-21 DIAGNOSIS — I25.83 CORONARY ARTERY DISEASE DUE TO LIPID RICH PLAQUE: Primary | ICD-10-CM

## 2023-04-21 DIAGNOSIS — I48.21 PERMANENT ATRIAL FIBRILLATION (H): ICD-10-CM

## 2023-04-21 LAB
ALBUMIN SERPL BCG-MCNC: 3.6 G/DL (ref 3.5–5.2)
ALP SERPL-CCNC: 165 U/L (ref 40–129)
ALT SERPL W P-5'-P-CCNC: 18 U/L (ref 10–50)
ANION GAP SERPL CALCULATED.3IONS-SCNC: 8 MMOL/L (ref 7–15)
AST SERPL W P-5'-P-CCNC: 31 U/L (ref 10–50)
BILIRUB SERPL-MCNC: 0.4 MG/DL
BUN SERPL-MCNC: 34.7 MG/DL (ref 8–23)
CALCIUM SERPL-MCNC: 9.4 MG/DL (ref 8.8–10.2)
CHLORIDE SERPL-SCNC: 108 MMOL/L (ref 98–107)
CHOLEST SERPL-MCNC: 152 MG/DL
CREAT SERPL-MCNC: 1.78 MG/DL (ref 0.67–1.17)
DEPRECATED HCO3 PLAS-SCNC: 24 MMOL/L (ref 22–29)
GFR SERPL CREATININE-BSD FRML MDRD: 41 ML/MIN/1.73M2
GLUCOSE SERPL-MCNC: 118 MG/DL (ref 70–99)
HDLC SERPL-MCNC: 35 MG/DL
LDLC SERPL CALC-MCNC: 64 MG/DL
NONHDLC SERPL-MCNC: 117 MG/DL
POTASSIUM SERPL-SCNC: 3.7 MMOL/L (ref 3.4–5.3)
PROT SERPL-MCNC: 7.2 G/DL (ref 6.4–8.3)
SODIUM SERPL-SCNC: 140 MMOL/L (ref 136–145)
TRIGL SERPL-MCNC: 263 MG/DL

## 2023-04-21 PROCEDURE — 80053 COMPREHEN METABOLIC PANEL: CPT | Performed by: INTERNAL MEDICINE

## 2023-04-21 PROCEDURE — 36415 COLL VENOUS BLD VENIPUNCTURE: CPT | Performed by: INTERNAL MEDICINE

## 2023-04-21 PROCEDURE — 80061 LIPID PANEL: CPT | Performed by: INTERNAL MEDICINE

## 2023-04-21 PROCEDURE — 99214 OFFICE O/P EST MOD 30 MIN: CPT | Performed by: INTERNAL MEDICINE

## 2023-04-21 PROCEDURE — 93282 PRGRMG EVAL IMPLANTABLE DFB: CPT | Performed by: INTERNAL MEDICINE

## 2023-04-21 NOTE — LETTER
4/21/2023    Jessica Ventura, DEBORAH  [address] [facility] [facility] [address]    RE: Dillon Botellog       Dear Colleague,     I had the pleasure of seeing Dillon Pennington in the [facility].  Reports today his heart rate 60  HEART CARE ENCOUNTER CONSULTATON NOTE      [facility]  [phone]      Assessment/Recommendations   Assessment/Plan:  1.  Coronary artery disease with a history of ischemic cardiomyopathy.  Multivessel stenting as outlined in 2018 with significant and persistent improvement in systolic function.  Overdue for follow-up.  The most recent echocardiogram is from February 2022 at which time his ejection fraction was 60 to 65%.  He has been maintained on Plavix in addition to Xarelto due to the multivessel stenting however it might be a potential consideration to stop Plavix.  I also reviewed with them today the Watchman device and will make additional decisions pending the planned stress test.  As noted he had multivessel stenting has not had any provocative testing and does believe he would be able to adequately walk on the treadmill and therefore we will plan an exercise stress echocardiogram.    2.  Chronic persistent atrial fibrillation.  On Xarelto 15 mg daily.  Device interrogation today demonstrates approximate 5% of the time his heart rate is higher than 120 bpm.  He is on a combination of metoprolol 200 mg daily and diltiazem and he has been seen by my EP colleagues in the past.  We will plan a Holter monitor.  I did review as noted above the Watchman device.    3.  Hypertension.  Blood pressure appears to be under good control.  He is on metoprolol, diltiazem and hydralazine.    4.  Renal insufficiency.  His lab results from his primary care provider November 2022 reveals an mild elevation in sodium and creatinine that was mildly higher at 2.03.  We will plan to repeat these laboratory studies today and correspond with his primary care provider and may need to  consider more formal nephrology consultation based on our review and discussion.    5.  Dyslipidemia.  We will plan follow-up lipids.  In November 2022 cholesterol was 168 but triglycerides were elevated at 344 with an LDL of 55.    Plan 1 stress echocardiogram he does believe he be able to adequately walk on the treadmill.  2.  24-hour Holter monitor to further define the heart rate  3.  Additional discussion regarding blood thinner options pending the above.  I did give him a pamphlet regarding the Watchman device.  4.  Abdominal aortic aneurysm screening  5.  Follow-up in 3 to 4 months.             History of Present Illness/Subjective    HPI: Dillon Pennington is a 68 year old male who is seen in follow-up.  We last visited January 2022 and has been overdue for follow-up.  He has a history of multivessel coronary artery disease with stenting in April 2019 with significant documented improvement in systolic function.  He has been on a combination of Xarelto and Plavix which was continued because of the diffuse nature of his coronary stenting at that time.  He has a history of persistent atrial fibrillation.  Most recent device interrogation in the chart record is from October 2022.  Underlying rhythm was reported to be atrial fibrillation.  He had some documented ventricular tachycardia that was likely A-fib with increased ventricular response.  Average heart rate in atrial fibrillation was stated to be 60 to 90 bpm.  The most recent echocardiogram is from February 2022 at which time ejection fraction was reported to be normal at 60 to 65%.  Chart records indicate the patient was in the ER March 2022 with oral bleeding and it appears that the Plavix was held at that time.  There is a note from his primary care physician January 2023 with complaints of small flecks of blood in his saliva.  He was given a nasal spray solution.  Cholesterol numbers are from November 2022 at which time cholesterol was 168 LDL 55.   Laboratory studies from that time also finds a sodium of 149 with a creatinine of 2.03.  He has been seen by nephrology in the past.    Overall he has been feeling well.  He has not had any chest discomfort, significant shortness of breath, orthopnea or PND.  He has been walking regularly.  He denies orthopnea or PND.  He denies awareness of the irregular heart rhythm.  He has had some occasional nosebleeds.    Recent Echocardiogram Results:  10/17/22  1:01 AM 10/17/22 10:33 AM [id number] [facility]      Narrative & Impression    Encounter Type: routine remote ICD transmission.  Device: BSCI Inogen ICD.  Pacing %/Programmed:  38% at VVIR 60.  Lead (s): Stable.  Battery longevity: 6yrs.  Presenting: atrial fibrillation with ventricular pacing and sensing 60-90 bpm.  Anticoagulant: Xarelto.  Ventricular arrhythmias: since 7/7/22; 95 nonsustained in the VT zone and 88 other untreated episodes, available EGMs suggest RVR during AF.  Device/Lead alerts: None.  Comments: Normal ICD function.  Plan: Patient due for annual device check in December 2022. JOSE ALEJANDRO Reynolds, Device Specialist     I have reviewed and interpreted the device interrogation, settings, programming, and encounter summary. The device is functioning within normal device parameters. I agree with the current findings, assessment and plan.         Recent Coronary Angiogram Results:    10/17/22  1:01 AM 10/17/22 10:33 AM [id number] [facility]      Narrative & Impression    Encounter Type: routine remote ICD transmission.  Device: BSCI Inogen ICD.  Pacing %/Programmed:  38% at VVIR 60.  Lead (s): Stable.  Battery longevity: 6yrs.  Presenting: atrial fibrillation with ventricular pacing and sensing 60-90 bpm.  Anticoagulant: Xarelto.  Ventricular arrhythmias: since 7/7/22; 95 nonsustained in the VT zone and 88 other untreated episodes, available EGMs suggest RVR during AF.  Device/Lead alerts:  None.  Comments: Normal ICD function.  Plan: Patient due for annual device check in December 2022. JOSE ALEJANDRO Reynolds, Device Specialist     I have reviewed and interpreted the device interrogation, settings, programming, and encounter summary. The device is functioning within normal device parameters. I agree with the current findings, assessment and plan.          Physical Examination  Review of Systems   Vitals: 123/78, pulse 66, weight 175 pounds.  Wt Readings from Last 3 Encounters:   03/19/22 77.1 kg (170 lb)   12/13/21 77.1 kg (170 lb)   04/26/21 77.1 kg (170 lb)       General Appearance:   no distress, normal body habitus   ENT/Mouth: membranes moist, no oral lesions or bleeding gums.      EYES:  no scleral icterus, normal conjunctivae   Neck: no carotid bruits or thyromegaly   Chest/Lungs:   lungs are clear to auscultation, no rales or wheezing, well-healed device scar, equal chest wall expansion    Cardiovascular:   Regular. Normal first and second heart sounds with soft systolic murmur rubs, or gallops; the carotid, radial and posterior tibial pulses are intact, Jugular venous pressure within normal limits, no significant edema bilaterally    Abdomen:  no , bruits, or tenderness; bowel sounds are present   Extremities: no cyanosis or clubbing   Skin: no xanthelasma, warm.    Neurologic: no tremors     Psychiatric: alert and oriented x3, calm        Please refer above for cardiac ROS details.        Medical History  Surgical History Family History Social History   Past Medical History:   Diagnosis Date    DMITRI (acute kidney injury) (H)     CKD (chronic kidney disease)     Dyslipidemia, goal LDL below 70 4/16/2018    Mushroom poisoning 9/11/2006    Nonischemic cardiomyopathy (H)     Paroxysmal atrial fibrillation (H)     Persistent atrial fibrillation (H) 4/12/2018     Past Surgical History:   Procedure Laterality Date    CHOLECYSTECTOMY      CV CORONARY ANGIOGRAM N/A 4/18/2018    Procedure: Coronary Angiogram;   Surgeon: Heriberto Cheung MD;  Location: [facility] Cath Lab;  Service:     CV CORONARY ANGIOGRAM N/A 4/20/2018    Procedure: Coronary Angiogram;  Surgeon: Javno Carrington MD;  Location: [facility] Cath Lab;  Service:     CV IMPELLA INSERT N/A 4/20/2018    Procedure: Impella Insert;  Surgeon: Javon Carrington MD;  Location: [facility] Cath Lab;  Service:     CV LEFT HEART CATHETERIZATION WITHOUT LEFT VENTRICULOGRAM Left 4/18/2018    Procedure: Left Heart Catheterization Without Left Ventriculogram;  Surgeon: Heriberto Cheung MD;  Location: [facility] Cath Lab;  Service:     EP ICD INSERT N/A 4/23/2018    Procedure: EP ICD Insert;  Surgeon: Rakesh Rivera MD;  Location: [facility] Cath Lab;  Service:     PICC  4/18/2018          No family history on file.     Social History     Socioeconomic History    Marital status:      Spouse name: Not on file    Number of children: Not on file    Years of education: Not on file    Highest education level: Not on file   Occupational History    Not on file   Tobacco Use    Smoking status: Never    Smokeless tobacco: Never   Vaping Use    Vaping status: Not on file   Substance and Sexual Activity    Alcohol use: Yes     Comment: Alcoholic Drinks/day: very rare     Drug use: No    Sexual activity: Not on file   Other Topics Concern    Not on file   Social History Narrative    The patient's son was present to provide the HPI.     Social Determinants of Health     Financial Resource Strain: Not on file   Food Insecurity: Not on file   Transportation Needs: Not on file   Physical Activity: Not on file   Stress: Not on file   Social Connections: Not on file   Intimate Partner Violence: Not on file   Housing Stability: Not on file           Medications  Allergies   Current Outpatient Medications   Medication Sig Dispense Refill    acetaminophen (TYLENOL) 325 MG tablet [ACETAMINOPHEN (TYLENOL) 325 MG TABLET] Take 650 mg by mouth every 6 (six) hours as needed  for pain.      allopurinol (ZYLOPRIM) 300 MG tablet [ALLOPURINOL (ZYLOPRIM) 300 MG TABLET] Take 300 mg by mouth daily.      atorvastatin (LIPITOR) 80 MG tablet [ATORVASTATIN (LIPITOR) 80 MG TABLET] Take 1 tablet (80 mg total) by mouth daily. 90 tablet 0    cetirizine (ZYRTEC) 10 MG tablet [CETIRIZINE (ZYRTEC) 10 MG TABLET] Take 1 tablet by mouth daily.  0    cholecalciferol, vitamin D3, (VITAMIN D3) 5,000 unit Tab [CHOLECALCIFEROL, VITAMIN D3, (VITAMIN D3) 5,000 UNIT TAB] Take 5,000 Units by mouth Daily at 5 pm.      clopidogrel (PLAVIX) 75 MG tablet TAKE 1 PILL (75 MG TOTAL) BY MOUTH DAILY /TXHUA HNUB NOJ 1 LUB [facility] NTSHAV STEFFANIE 90 tablet 1    diltiazem ER COATED BEADS (CARDIZEM CD/CARTIA XT) 180 MG 24 hr capsule TAKE 1 CAPSULE (180 MG TOTAL) BY MOUTH DAILY/ TXHUA HNUB NOJ 1 LUB [facility] NTSV SIAB 90 capsule 1    fluticasone (VERAMYST) 27.5 mcg/actuation nasal spray Spray 1 spray in nostril nightly as needed       furosemide (LASIX) 20 MG tablet [FUROSEMIDE (LASIX) 20 MG TABLET] Take 1 tablet (20 mg total) by mouth daily. 90 tablet 0    hydrALAZINE (APRESOLINE) 25 MG tablet [HYDRALAZINE (APRESOLINE) 25 MG TABLET] TAKE 1 PILL (25 MG TOTAL) BY MOUTH 3 TIMES EVERYDAY/ TXHUA HNUB NOJ 1 LUB [facility] 3 ZAUG 270 tablet 2    levothyroxine (SYNTHROID) 88 MCG tablet [LEVOTHYROXINE (SYNTHROID) 88 MCG TABLET] Take 88 mcg by mouth Daily at 6:00 am. Indications: hypothyroidism      metoprolol succinate (TOPROL-XL) 100 MG 24 hr tablet [METOPROLOL SUCCINATE (TOPROL-XL) 100 MG 24 HR TABLET] TAKE 2 TABLETS (200 MG TOTAL) BY MOUTH DAILY/ TXHUA HNUB NOJ 2 LUB [facility] NTSHAV SIAB 180 tablet 1    nitroGLYcerin (NITROSTAT) 0.4 MG sublingual tablet DISSOLVE 1 PILL UNDER TONGUE EVERY 5 MINUTES AS NEEDED FOR CHEST PAIN/YOG MOB HAUV SIAB MUAB IB LUB TSHUAJ  HAUV QAB NPLAIG TXHUA 5 DINA THIS 25 tablet 3    Vitamin D, Cholecalciferol, 25 MCG (1000 UT) CAPS Take 1 capsule by mouth daily      XARELTO 15 mg tablet [XARELTO  15 MG TABLET] TAKE 1 TABLET (15 MG TOTAL) BY MOUTH DAILY WITH SUPPER. 90 tablet 1     No Known Allergies       Lab Results    Chemistry/lipid CBC Cardiac Enzymes/BNP/TSH/INR   Recent Labs   Lab Test 11/10/22  1501   CHOL 168   HDL 44   LDL 55   TRIG 344*     Recent Labs   Lab Test 11/10/22  1501 07/20/21  1512 10/08/20  1353   LDL 55 75 61     Recent Labs   Lab Test 11/10/22  1501   *   POTASSIUM 3.6   CHLORIDE 108*   CO2 22   *   BUN 29.4*   CR 2.03*   GFRESTIMATED 35*   BOB 9.2     Recent Labs   Lab Test 11/10/22  1501 07/30/21  1437 07/20/21  1512   CR 2.03* 1.74* 1.59*     Recent Labs   Lab Test 04/20/18  1025   A1C 5.3          Recent Labs   Lab Test 11/10/22  1501   WBC 7.7   HGB 16.6   HCT 49.1   MCV 94        Recent Labs   Lab Test 11/10/22  1501 02/19/20  1506 11/02/19  1049   HGB 16.6 16.3 16.4    Recent Labs   Lab Test 05/16/18  2035 04/17/18  2212 04/13/18  0246   TROPONINI <0.01 0.06 0.12     Recent Labs   Lab Test 02/19/20  1506 05/16/18  2035 04/17/18  2212   * 81* 335*     Recent Labs   Lab Test 11/10/22  1501   TSH 2.04     Recent Labs   Lab Test 11/02/19  1049 04/29/18  1139 04/26/18  1447   INR 1.47* 1.19* 1.79*        Parth Russo MD      Thank you for allowing me to participate in the care of your patient.      Sincerely,     Parth Russo MD     [facility]  cc:   Alo Saez MD  [address]  [place] [address]

## 2023-04-21 NOTE — PROGRESS NOTES
Reports today his heart rate 60  HEART CARE ENCOUNTER CONSULTATON NOTE      [facility]  [phone]      Assessment/Recommendations   Assessment/Plan:  1.  Coronary artery disease with a history of ischemic cardiomyopathy.  Multivessel stenting as outlined in 2018 with significant and persistent improvement in systolic function.  Overdue for follow-up.  The most recent echocardiogram is from February 2022 at which time his ejection fraction was 60 to 65%.  He has been maintained on Plavix in addition to Xarelto due to the multivessel stenting however it might be a potential consideration to stop Plavix.  I also reviewed with them today the Watchman device and will make additional decisions pending the planned stress test.  As noted he had multivessel stenting has not had any provocative testing and does believe he would be able to adequately walk on the treadmill and therefore we will plan an exercise stress echocardiogram.    2.  Chronic persistent atrial fibrillation.  On Xarelto 15 mg daily.  Device interrogation today demonstrates approximate 5% of the time his heart rate is higher than 120 bpm.  He is on a combination of metoprolol 200 mg daily and diltiazem and he has been seen by my EP colleagues in the past.  We will plan a Holter monitor.  I did review as noted above the Watchman device.    3.  Hypertension.  Blood pressure appears to be under good control.  He is on metoprolol, diltiazem and hydralazine.    4.  Renal insufficiency.  His lab results from his primary care provider November 2022 reveals an mild elevation in sodium and creatinine that was mildly higher at 2.03.  We will plan to repeat these laboratory studies today and correspond with his primary care provider and may need to consider more formal nephrology consultation based on our review and discussion.    5.  Dyslipidemia.  We will plan follow-up lipids.  In November 2022 cholesterol was 168 but triglycerides were  elevated at 344 with an LDL of 55.    Plan 1 stress echocardiogram he does believe he be able to adequately walk on the treadmill.  2.  24-hour Holter monitor to further define the heart rate  3.  Additional discussion regarding blood thinner options pending the above.  I did give him a pamphlet regarding the Watchman device.  4.  Abdominal aortic aneurysm screening  5.  Follow-up in 3 to 4 months.             History of Present Illness/Subjective    HPI: Dillon Pennington is a 68 year old male who is seen in follow-up.  We last visited January 2022 and has been overdue for follow-up.  He has a history of multivessel coronary artery disease with stenting in April 2019 with significant documented improvement in systolic function.  He has been on a combination of Xarelto and Plavix which was continued because of the diffuse nature of his coronary stenting at that time.  He has a history of persistent atrial fibrillation.  Most recent device interrogation in the chart record is from October 2022.  Underlying rhythm was reported to be atrial fibrillation.  He had some documented ventricular tachycardia that was likely A-fib with increased ventricular response.  Average heart rate in atrial fibrillation was stated to be 60 to 90 bpm.  The most recent echocardiogram is from February 2022 at which time ejection fraction was reported to be normal at 60 to 65%.  Chart records indicate the patient was in the ER March 2022 with oral bleeding and it appears that the Plavix was held at that time.  There is a note from his primary care physician January 2023 with complaints of small flecks of blood in his saliva.  He was given a nasal spray solution.  Cholesterol numbers are from November 2022 at which time cholesterol was 168 LDL 55.  Laboratory studies from that time also finds a sodium of 149 with a creatinine of 2.03.  He has been seen by nephrology in the past.    Overall he has been feeling well.  He has not had any chest  discomfort, significant shortness of breath, orthopnea or PND.  He has been walking regularly.  He denies orthopnea or PND.  He denies awareness of the irregular heart rhythm.  He has had some occasional nosebleeds.    Recent Echocardiogram Results:  10/17/22  1:01 AM 10/17/22 10:33 AM [id number] [facility]      Narrative & Impression    Encounter Type: routine remote ICD transmission.  Device: BSCI Inogen ICD.  Pacing %/Programmed:  38% at VVIR 60.  Lead (s): Stable.  Battery longevity: 6yrs.  Presenting: atrial fibrillation with ventricular pacing and sensing 60-90 bpm.  Anticoagulant: Xarelto.  Ventricular arrhythmias: since 7/7/22; 95 nonsustained in the VT zone and 88 other untreated episodes, available EGMs suggest RVR during AF.  Device/Lead alerts: None.  Comments: Normal ICD function.  Plan: Patient due for annual device check in December 2022. JOSE ALEJANDRO Reynolds Device Specialist     I have reviewed and interpreted the device interrogation, settings, programming, and encounter summary. The device is functioning within normal device parameters. I agree with the current findings, assessment and plan.         Recent Coronary Angiogram Results:    10/17/22  1:01 AM 10/17/22 10:33 AM [id number] [facility]      Narrative & Impression    Encounter Type: routine remote ICD transmission.  Device: BSCI Inogen ICD.  Pacing %/Programmed:  38% at VVIR 60.  Lead (s): Stable.  Battery longevity: 6yrs.  Presenting: atrial fibrillation with ventricular pacing and sensing 60-90 bpm.  Anticoagulant: Xarelto.  Ventricular arrhythmias: since 7/7/22; 95 nonsustained in the VT zone and 88 other untreated episodes, available EGMs suggest RVR during AF.  Device/Lead alerts: None.  Comments: Normal ICD function.  Plan: Patient due for annual device check in December 2022. JOSE ALEJANDRO Reynolds Device Specialist     I have reviewed and interpreted the device interrogation, settings,  programming, and encounter summary. The device is functioning within normal device parameters. I agree with the current findings, assessment and plan.          Physical Examination  Review of Systems   Vitals: 123/78, pulse 66, weight 175 pounds.  Wt Readings from Last 3 Encounters:   03/19/22 77.1 kg (170 lb)   12/13/21 77.1 kg (170 lb)   04/26/21 77.1 kg (170 lb)       General Appearance:   no distress, normal body habitus   ENT/Mouth: membranes moist, no oral lesions or bleeding gums.      EYES:  no scleral icterus, normal conjunctivae   Neck: no carotid bruits or thyromegaly   Chest/Lungs:   lungs are clear to auscultation, no rales or wheezing, well-healed device scar, equal chest wall expansion    Cardiovascular:   Regular. Normal first and second heart sounds with soft systolic murmur rubs, or gallops; the carotid, radial and posterior tibial pulses are intact, Jugular venous pressure within normal limits, no significant edema bilaterally    Abdomen:  no , bruits, or tenderness; bowel sounds are present   Extremities: no cyanosis or clubbing   Skin: no xanthelasma, warm.    Neurologic: no tremors     Psychiatric: alert and oriented x3, calm        Please refer above for cardiac ROS details.        Medical History  Surgical History Family History Social History   Past Medical History:   Diagnosis Date     DMITRI (acute kidney injury) (H)      CKD (chronic kidney disease)      Dyslipidemia, goal LDL below 70 4/16/2018     Mushroom poisoning 9/11/2006     Nonischemic cardiomyopathy (H)      Paroxysmal atrial fibrillation (H)      Persistent atrial fibrillation (H) 4/12/2018     Past Surgical History:   Procedure Laterality Date     CHOLECYSTECTOMY       CV CORONARY ANGIOGRAM N/A 4/18/2018    Procedure: Coronary Angiogram;  Surgeon: Heriberto Cheung MD;  Location: [facility];  Service:      CV CORONARY ANGIOGRAM N/A 4/20/2018    Procedure: Coronary Angiogram;  Surgeon: Javon Carrington MD;   Location: [facility];  Service:      CV IMPELLA INSERT N/A 4/20/2018    Procedure: Impella Insert;  Surgeon: Javon Carrington MD;  Location: [facility];  Service:      CV LEFT HEART CATHETERIZATION WITHOUT LEFT VENTRICULOGRAM Left 4/18/2018    Procedure: Left Heart Catheterization Without Left Ventriculogram;  Surgeon: Heriberto Cheung MD;  Location: [facility];  Service:      EP ICD INSERT N/A 4/23/2018    Procedure: EP ICD Insert;  Surgeon: Rakesh Rivera MD;  Location: [facility];  Service:      PICC  4/18/2018          No family history on file.     Social History     Socioeconomic History     Marital status:      Spouse name: Not on file     Number of children: Not on file     Years of education: Not on file     Highest education level: Not on file   Occupational History     Not on file   Tobacco Use     Smoking status: Never     Smokeless tobacco: Never   Vaping Use     Vaping status: Not on file   Substance and Sexual Activity     Alcohol use: Yes     Comment: Alcoholic Drinks/day: very rare      Drug use: No     Sexual activity: Not on file   Other Topics Concern     Not on file   Social History Narrative    The patient's son was present to provide the HPI.     Social Determinants of Health     Financial Resource Strain: Not on file   Food Insecurity: Not on file   Transportation Needs: Not on file   Physical Activity: Not on file   Stress: Not on file   Social Connections: Not on file   Intimate Partner Violence: Not on file   Housing Stability: Not on file           Medications  Allergies   Current Outpatient Medications   Medication Sig Dispense Refill     acetaminophen (TYLENOL) 325 MG tablet [ACETAMINOPHEN (TYLENOL) 325 MG TABLET] Take 650 mg by mouth every 6 (six) hours as needed for pain.       allopurinol (ZYLOPRIM) 300 MG tablet [ALLOPURINOL (ZYLOPRIM) 300 MG TABLET] Take 300 mg by mouth daily.       atorvastatin (LIPITOR) 80 MG tablet  [ATORVASTATIN (LIPITOR) 80 MG TABLET] Take 1 tablet (80 mg total) by mouth daily. 90 tablet 0     cetirizine (ZYRTEC) 10 MG tablet [CETIRIZINE (ZYRTEC) 10 MG TABLET] Take 1 tablet by mouth daily.  0     cholecalciferol, vitamin D3, (VITAMIN D3) 5,000 unit Tab [CHOLECALCIFEROL, VITAMIN D3, (VITAMIN D3) 5,000 UNIT TAB] Take 5,000 Units by mouth Daily at 5 pm.       clopidogrel (PLAVIX) 75 MG tablet TAKE 1 PILL (75 MG TOTAL) BY MOUTH DAILY /TXHUA HNUB NOJ 1 LUB [facility] KOM NTSHAV STEFFANIE 90 tablet 1     diltiazem ER COATED BEADS (CARDIZEM CD/CARTIA XT) 180 MG 24 hr capsule TAKE 1 CAPSULE (180 MG TOTAL) BY MOUTH DAILY/ TXHUA HNUB NOJ 1 [facility] ZOO NTSHAV SIAB 90 capsule 1     fluticasone (VERAMYST) 27.5 mcg/actuation nasal spray Spray 1 spray in nostril nightly as needed        furosemide (LASIX) 20 MG tablet [FUROSEMIDE (LASIX) 20 MG TABLET] Take 1 tablet (20 mg total) by mouth daily. 90 tablet 0     hydrALAZINE (APRESOLINE) 25 MG tablet [HYDRALAZINE (APRESOLINE) 25 MG TABLET] TAKE 1 PILL (25 MG TOTAL) BY MOUTH 3 TIMES EVERYDAY/ TXHUA HNUB NOJ 1 [facility] 3 ZAUG 270 tablet 2     levothyroxine (SYNTHROID) 88 MCG tablet [LEVOTHYROXINE (SYNTHROID) 88 MCG TABLET] Take 88 mcg by mouth Daily at 6:00 am. Indications: hypothyroidism       metoprolol succinate (TOPROL-XL) 100 MG 24 hr tablet [METOPROLOL SUCCINATE (TOPROL-XL) 100 MG 24 HR TABLET] TAKE 2 TABLETS (200 MG TOTAL) BY MOUTH DAILY/ TXHUA HNUB NOJ 2 LUB [facility] ZOO NTSHAV SIAB 180 tablet 1     nitroGLYcerin (NITROSTAT) 0.4 MG sublingual tablet DISSOLVE 1 PILL UNDER TONGUE EVERY 5 MINUTES AS NEEDED FOR CHEST PAIN/YOG MOB HAUV SIAB MUAB IB LUB Merged with [facility] NAVYA HAUV QAB NPLAIG TXHUA 5 DINA THIS 25 tablet 3     Vitamin D, Cholecalciferol, 25 MCG (1000 UT) CAPS Take 1 capsule by mouth daily       XARELTO 15 mg tablet [XARELTO 15 MG TABLET] TAKE 1 TABLET (15 MG TOTAL) BY MOUTH DAILY WITH SUPPER. 90 tablet 1     No Known Allergies       Lab Results    Chemistry/lipid CBC  Cardiac Enzymes/BNP/TSH/INR   Recent Labs   Lab Test 11/10/22  1501   CHOL 168   HDL 44   LDL 55   TRIG 344*     Recent Labs   Lab Test 11/10/22  1501 07/20/21  1512 10/08/20  1353   LDL 55 75 61     Recent Labs   Lab Test 11/10/22  1501   *   POTASSIUM 3.6   CHLORIDE 108*   CO2 22   *   BUN 29.4*   CR 2.03*   GFRESTIMATED 35*   BOB 9.2     Recent Labs   Lab Test 11/10/22  1501 07/30/21  1437 07/20/21  1512   CR 2.03* 1.74* 1.59*     Recent Labs   Lab Test 04/20/18  1025   A1C 5.3          Recent Labs   Lab Test 11/10/22  1501   WBC 7.7   HGB 16.6   HCT 49.1   MCV 94        Recent Labs   Lab Test 11/10/22  1501 02/19/20  1506 11/02/19  1049   HGB 16.6 16.3 16.4    Recent Labs   Lab Test 05/16/18  2035 04/17/18  2212 04/13/18  0246   TROPONINI <0.01 0.06 0.12     Recent Labs   Lab Test 02/19/20  1506 05/16/18  2035 04/17/18  2212   * 81* 335*     Recent Labs   Lab Test 11/10/22  1501   TSH 2.04     Recent Labs   Lab Test 11/02/19  1049 04/29/18  1139 04/26/18  1447   INR 1.47* 1.19* 1.79*        Parth Russo MD

## 2023-04-24 NOTE — RESULT ENCOUNTER NOTE
Lab work looks stable,kidney function mildly improved from previous, await holter, I did ask him to review the watchman device pamphlet if we can connect with his daughter  ty mdg

## 2023-05-04 LAB
MDC_IDC_LEAD_IMPLANT_DT: NORMAL
MDC_IDC_LEAD_IMPLANT_DT: NORMAL
MDC_IDC_LEAD_LOCATION: NORMAL
MDC_IDC_LEAD_LOCATION: NORMAL
MDC_IDC_LEAD_LOCATION_DETAIL_1: NORMAL
MDC_IDC_LEAD_LOCATION_DETAIL_1: NORMAL
MDC_IDC_LEAD_MFG: NORMAL
MDC_IDC_LEAD_MFG: NORMAL
MDC_IDC_LEAD_MODEL: NORMAL
MDC_IDC_LEAD_MODEL: NORMAL
MDC_IDC_LEAD_POLARITY_TYPE: NORMAL
MDC_IDC_LEAD_POLARITY_TYPE: NORMAL
MDC_IDC_LEAD_SERIAL: NORMAL
MDC_IDC_LEAD_SERIAL: NORMAL
MDC_IDC_MSMT_BATTERY_DTM: NORMAL
MDC_IDC_MSMT_BATTERY_REMAINING_LONGEVITY: 66 MO
MDC_IDC_MSMT_BATTERY_REMAINING_PERCENTAGE: 78 %
MDC_IDC_MSMT_BATTERY_STATUS: NORMAL
MDC_IDC_MSMT_CAP_CHARGE_DTM: NORMAL
MDC_IDC_MSMT_CAP_CHARGE_TIME: 11 S
MDC_IDC_MSMT_CAP_CHARGE_TYPE: NORMAL
MDC_IDC_MSMT_LEADCHNL_RA_IMPEDANCE_VALUE: 654 OHM
MDC_IDC_MSMT_LEADCHNL_RA_LEAD_CHANNEL_STATUS: NORMAL
MDC_IDC_MSMT_LEADCHNL_RA_SENSING_INTR_AMPL: 0.7 MV
MDC_IDC_MSMT_LEADCHNL_RV_IMPEDANCE_VALUE: 795 OHM
MDC_IDC_MSMT_LEADCHNL_RV_PACING_THRESHOLD_AMPLITUDE: 0.7 V
MDC_IDC_MSMT_LEADCHNL_RV_PACING_THRESHOLD_PULSEWIDTH: 0.4 MS
MDC_IDC_MSMT_LEADCHNL_RV_SENSING_INTR_AMPL: 16.1 MV
MDC_IDC_PG_IMPLANT_DTM: NORMAL
MDC_IDC_PG_MFG: NORMAL
MDC_IDC_PG_MODEL: NORMAL
MDC_IDC_PG_SERIAL: NORMAL
MDC_IDC_PG_TYPE: NORMAL
MDC_IDC_SESS_CLINIC_NAME: NORMAL
MDC_IDC_SESS_DTM: NORMAL
MDC_IDC_SESS_TYPE: NORMAL
MDC_IDC_SET_BRADY_LOWRATE: 60 {BEATS}/MIN
MDC_IDC_SET_BRADY_MAX_SENSOR_RATE: 120 {BEATS}/MIN
MDC_IDC_SET_BRADY_MODE: NORMAL
MDC_IDC_SET_LEADCHNL_RA_PACING_POLARITY: NORMAL
MDC_IDC_SET_LEADCHNL_RA_SENSING_ADAPTATION_MODE: NORMAL
MDC_IDC_SET_LEADCHNL_RA_SENSING_POLARITY: NORMAL
MDC_IDC_SET_LEADCHNL_RA_SENSING_SENSITIVITY: 0.15 MV
MDC_IDC_SET_LEADCHNL_RV_PACING_AMPLITUDE: NORMAL
MDC_IDC_SET_LEADCHNL_RV_PACING_POLARITY: NORMAL
MDC_IDC_SET_LEADCHNL_RV_PACING_PULSEWIDTH: 0.4 MS
MDC_IDC_SET_LEADCHNL_RV_SENSING_ADAPTATION_MODE: NORMAL
MDC_IDC_SET_LEADCHNL_RV_SENSING_POLARITY: NORMAL
MDC_IDC_SET_LEADCHNL_RV_SENSING_SENSITIVITY: 0.6 MV
MDC_IDC_SET_ZONE_DETECTION_INTERVAL: 250 MS
MDC_IDC_SET_ZONE_DETECTION_INTERVAL: 324 MS
MDC_IDC_SET_ZONE_TYPE: NORMAL
MDC_IDC_SET_ZONE_TYPE: NORMAL
MDC_IDC_SET_ZONE_VENDOR_TYPE: NORMAL
MDC_IDC_SET_ZONE_VENDOR_TYPE: NORMAL
MDC_IDC_STAT_AT_MODE_SW_COUNT: 0
MDC_IDC_STAT_BRADY_DTM_END: NORMAL
MDC_IDC_STAT_BRADY_DTM_START: NORMAL
MDC_IDC_STAT_BRADY_RA_PERCENT_PACED: 0 %
MDC_IDC_STAT_BRADY_RV_PERCENT_PACED: 37 %
MDC_IDC_STAT_EPISODE_RECENT_COUNT: 0
MDC_IDC_STAT_EPISODE_RECENT_COUNT: 113
MDC_IDC_STAT_EPISODE_RECENT_COUNT: 120
MDC_IDC_STAT_EPISODE_RECENT_COUNT_DTM_END: NORMAL
MDC_IDC_STAT_EPISODE_RECENT_COUNT_DTM_START: NORMAL
MDC_IDC_STAT_EPISODE_TYPE: NORMAL
MDC_IDC_STAT_EPISODE_VENDOR_TYPE: NORMAL
MDC_IDC_STAT_TACHYTHERAPY_ATP_DELIVERED_RECENT: 0
MDC_IDC_STAT_TACHYTHERAPY_ATP_DELIVERED_TOTAL: 3
MDC_IDC_STAT_TACHYTHERAPY_RECENT_DTM_END: NORMAL
MDC_IDC_STAT_TACHYTHERAPY_RECENT_DTM_START: NORMAL
MDC_IDC_STAT_TACHYTHERAPY_SHOCKS_ABORTED_RECENT: 0
MDC_IDC_STAT_TACHYTHERAPY_SHOCKS_ABORTED_TOTAL: 0
MDC_IDC_STAT_TACHYTHERAPY_SHOCKS_DELIVERED_RECENT: 0
MDC_IDC_STAT_TACHYTHERAPY_SHOCKS_DELIVERED_TOTAL: 0
MDC_IDC_STAT_TACHYTHERAPY_TOTAL_DTM_END: NORMAL
MDC_IDC_STAT_TACHYTHERAPY_TOTAL_DTM_START: NORMAL

## 2023-05-11 ENCOUNTER — LAB REQUISITION (OUTPATIENT)
Dept: LAB | Facility: CLINIC | Age: 68
End: 2023-05-11
Payer: COMMERCIAL

## 2023-05-11 ENCOUNTER — HOSPITAL ENCOUNTER (OUTPATIENT)
Facility: CLINIC | Age: 68
Discharge: HOME | End: 2023-05-11
Admitting: NURSE PRACTITIONER
Payer: COMMERCIAL

## 2023-05-11 DIAGNOSIS — E03.9 HYPOTHYROIDISM, UNSPECIFIED: ICD-10-CM

## 2023-05-11 DIAGNOSIS — I13.0 HYPERTENSIVE HEART AND CHRONIC KIDNEY DISEASE WITH HEART FAILURE AND STAGE 1 THROUGH STAGE 4 CHRONIC KIDNEY DISEASE, OR UNSPECIFIED CHRONIC KIDNEY DISEASE (H): ICD-10-CM

## 2023-05-11 PROCEDURE — 84439 ASSAY OF FREE THYROXINE: CPT | Mod: ORL | Performed by: NURSE PRACTITIONER

## 2023-05-11 PROCEDURE — 80048 BASIC METABOLIC PNL TOTAL CA: CPT | Performed by: NURSE PRACTITIONER

## 2023-05-11 PROCEDURE — 84443 ASSAY THYROID STIM HORMONE: CPT | Mod: ORL | Performed by: NURSE PRACTITIONER

## 2023-05-12 LAB
ANION GAP SERPL CALCULATED.3IONS-SCNC: 12 MMOL/L (ref 7–15)
BUN SERPL-MCNC: 36.8 MG/DL (ref 8–23)
CALCIUM SERPL-MCNC: 9.6 MG/DL (ref 8.8–10.2)
CHLORIDE SERPL-SCNC: 106 MMOL/L (ref 98–107)
CREAT SERPL-MCNC: 2.16 MG/DL (ref 0.67–1.17)
DEPRECATED HCO3 PLAS-SCNC: 24 MMOL/L (ref 22–29)
GFR SERPL CREATININE-BSD FRML MDRD: 33 ML/MIN/1.73M2
GLUCOSE SERPL-MCNC: 122 MG/DL (ref 70–99)
POTASSIUM SERPL-SCNC: 3.8 MMOL/L (ref 3.4–5.3)
SODIUM SERPL-SCNC: 142 MMOL/L (ref 136–145)
T4 FREE SERPL-MCNC: 1.51 NG/DL (ref 0.9–1.7)
TSH SERPL DL<=0.005 MIU/L-ACNC: 3.47 UIU/ML (ref 0.3–4.2)

## 2023-05-15 ENCOUNTER — HOSPITAL ENCOUNTER (OUTPATIENT)
Dept: CARDIOLOGY | Facility: HOSPITAL | Age: 68
Discharge: HOME | End: 2023-05-15
Attending: INTERNAL MEDICINE
Payer: COMMERCIAL

## 2023-05-15 DIAGNOSIS — I48.21 PERMANENT ATRIAL FIBRILLATION (H): ICD-10-CM

## 2023-05-15 PROCEDURE — 93226 XTRNL ECG REC<48 HR SCAN A/R: CPT

## 2023-05-21 PROCEDURE — 93227 XTRNL ECG REC<48 HR R&I: CPT | Performed by: INTERNAL MEDICINE

## 2023-05-22 ENCOUNTER — APPOINTMENT (OUTPATIENT)
Dept: INTERPRETER SERVICES | Facility: CLINIC | Age: 68
End: 2023-05-22
Payer: COMMERCIAL

## 2023-05-26 ENCOUNTER — APPOINTMENT (OUTPATIENT)
Dept: INTERPRETER SERVICES | Facility: CLINIC | Age: 68
End: 2023-05-26
Payer: COMMERCIAL

## 2023-07-17 ENCOUNTER — ANCILLARY PROCEDURE (OUTPATIENT)
Dept: CARDIOLOGY | Facility: CLINIC | Age: 68
End: 2023-07-17
Attending: INTERNAL MEDICINE
Payer: COMMERCIAL

## 2023-07-17 DIAGNOSIS — I49.01 VENTRICULAR FIBRILLATION (H): ICD-10-CM

## 2023-07-17 DIAGNOSIS — I42.0 DILATED CARDIOMYOPATHY (H): ICD-10-CM

## 2023-07-17 DIAGNOSIS — Z95.810 ICD (IMPLANTABLE CARDIOVERTER-DEFIBRILLATOR) IN PLACE: ICD-10-CM

## 2023-07-17 LAB
MDC_IDC_EPISODE_DTM: NORMAL
MDC_IDC_EPISODE_DURATION: 11 S
MDC_IDC_EPISODE_DURATION: 13 S
MDC_IDC_EPISODE_DURATION: 15 S
MDC_IDC_EPISODE_DURATION: 29 S
MDC_IDC_EPISODE_ID: NORMAL
MDC_IDC_EPISODE_TYPE: NORMAL
MDC_IDC_EPISODE_VENDOR_TYPE: NORMAL
MDC_IDC_LEAD_IMPLANT_DT: NORMAL
MDC_IDC_LEAD_IMPLANT_DT: NORMAL
MDC_IDC_LEAD_LOCATION: NORMAL
MDC_IDC_LEAD_LOCATION: NORMAL
MDC_IDC_LEAD_LOCATION_DETAIL_1: NORMAL
MDC_IDC_LEAD_LOCATION_DETAIL_1: NORMAL
MDC_IDC_LEAD_MFG: NORMAL
MDC_IDC_LEAD_MFG: NORMAL
MDC_IDC_LEAD_MODEL: NORMAL
MDC_IDC_LEAD_MODEL: NORMAL
MDC_IDC_LEAD_POLARITY_TYPE: NORMAL
MDC_IDC_LEAD_POLARITY_TYPE: NORMAL
MDC_IDC_LEAD_SERIAL: NORMAL
MDC_IDC_LEAD_SERIAL: NORMAL
MDC_IDC_MSMT_BATTERY_DTM: NORMAL
MDC_IDC_MSMT_BATTERY_REMAINING_LONGEVITY: 66 MO
MDC_IDC_MSMT_BATTERY_REMAINING_PERCENTAGE: 72 %
MDC_IDC_MSMT_BATTERY_STATUS: NORMAL
MDC_IDC_MSMT_CAP_CHARGE_DTM: NORMAL
MDC_IDC_MSMT_CAP_CHARGE_TIME: 11.1 S
MDC_IDC_MSMT_CAP_CHARGE_TYPE: NORMAL
MDC_IDC_MSMT_LEADCHNL_RA_IMPEDANCE_VALUE: 700 OHM
MDC_IDC_MSMT_LEADCHNL_RA_PACING_THRESHOLD_AMPLITUDE: 1 V
MDC_IDC_MSMT_LEADCHNL_RA_PACING_THRESHOLD_PULSEWIDTH: 0.4 MS
MDC_IDC_MSMT_LEADCHNL_RV_IMPEDANCE_VALUE: 849 OHM
MDC_IDC_MSMT_LEADCHNL_RV_PACING_THRESHOLD_AMPLITUDE: 0.7 V
MDC_IDC_MSMT_LEADCHNL_RV_PACING_THRESHOLD_PULSEWIDTH: 0.4 MS
MDC_IDC_PG_IMPLANT_DTM: NORMAL
MDC_IDC_PG_MFG: NORMAL
MDC_IDC_PG_MODEL: NORMAL
MDC_IDC_PG_SERIAL: NORMAL
MDC_IDC_PG_TYPE: NORMAL
MDC_IDC_SESS_CLINIC_NAME: NORMAL
MDC_IDC_SESS_DTM: NORMAL
MDC_IDC_SESS_TYPE: NORMAL
MDC_IDC_SET_BRADY_AT_MODE_SWITCH_RATE: 160 {BEATS}/MIN
MDC_IDC_SET_BRADY_LOWRATE: 60 {BEATS}/MIN
MDC_IDC_SET_BRADY_MAX_SENSOR_RATE: 120 {BEATS}/MIN
MDC_IDC_SET_BRADY_MODE: NORMAL
MDC_IDC_SET_LEADCHNL_RA_PACING_POLARITY: NORMAL
MDC_IDC_SET_LEADCHNL_RA_SENSING_ADAPTATION_MODE: NORMAL
MDC_IDC_SET_LEADCHNL_RA_SENSING_POLARITY: NORMAL
MDC_IDC_SET_LEADCHNL_RA_SENSING_SENSITIVITY: 0.15 MV
MDC_IDC_SET_LEADCHNL_RV_PACING_AMPLITUDE: 1.5 V
MDC_IDC_SET_LEADCHNL_RV_PACING_POLARITY: NORMAL
MDC_IDC_SET_LEADCHNL_RV_PACING_PULSEWIDTH: 0.4 MS
MDC_IDC_SET_LEADCHNL_RV_SENSING_ADAPTATION_MODE: NORMAL
MDC_IDC_SET_LEADCHNL_RV_SENSING_POLARITY: NORMAL
MDC_IDC_SET_LEADCHNL_RV_SENSING_SENSITIVITY: 0.6 MV
MDC_IDC_SET_ZONE_DETECTION_INTERVAL: 250 MS
MDC_IDC_SET_ZONE_DETECTION_INTERVAL: 324 MS
MDC_IDC_SET_ZONE_TYPE: NORMAL
MDC_IDC_SET_ZONE_TYPE: NORMAL
MDC_IDC_SET_ZONE_VENDOR_TYPE: NORMAL
MDC_IDC_SET_ZONE_VENDOR_TYPE: NORMAL
MDC_IDC_STAT_BRADY_DTM_END: NORMAL
MDC_IDC_STAT_BRADY_DTM_START: NORMAL
MDC_IDC_STAT_BRADY_RA_PERCENT_PACED: 0 %
MDC_IDC_STAT_BRADY_RV_PERCENT_PACED: 35 %
MDC_IDC_STAT_EPISODE_RECENT_COUNT: 0
MDC_IDC_STAT_EPISODE_RECENT_COUNT: 3
MDC_IDC_STAT_EPISODE_RECENT_COUNT_DTM_END: NORMAL
MDC_IDC_STAT_EPISODE_RECENT_COUNT_DTM_START: NORMAL
MDC_IDC_STAT_EPISODE_TYPE: NORMAL
MDC_IDC_STAT_EPISODE_VENDOR_TYPE: NORMAL
MDC_IDC_STAT_TACHYTHERAPY_ATP_DELIVERED_RECENT: 0
MDC_IDC_STAT_TACHYTHERAPY_ATP_DELIVERED_TOTAL: 3
MDC_IDC_STAT_TACHYTHERAPY_RECENT_DTM_END: NORMAL
MDC_IDC_STAT_TACHYTHERAPY_RECENT_DTM_START: NORMAL
MDC_IDC_STAT_TACHYTHERAPY_SHOCKS_ABORTED_RECENT: 0
MDC_IDC_STAT_TACHYTHERAPY_SHOCKS_ABORTED_TOTAL: 0
MDC_IDC_STAT_TACHYTHERAPY_SHOCKS_DELIVERED_RECENT: 0
MDC_IDC_STAT_TACHYTHERAPY_SHOCKS_DELIVERED_TOTAL: 0
MDC_IDC_STAT_TACHYTHERAPY_TOTAL_DTM_END: NORMAL
MDC_IDC_STAT_TACHYTHERAPY_TOTAL_DTM_START: NORMAL

## 2023-07-17 PROCEDURE — 93296 REM INTERROG EVL PM/IDS: CPT | Performed by: INTERNAL MEDICINE

## 2023-07-17 PROCEDURE — 93295 DEV INTERROG REMOTE 1/2/MLT: CPT | Performed by: INTERNAL MEDICINE

## 2023-10-16 ENCOUNTER — ANCILLARY PROCEDURE (OUTPATIENT)
Dept: CARDIOLOGY | Facility: CLINIC | Age: 68
End: 2023-10-16
Attending: INTERNAL MEDICINE
Payer: COMMERCIAL

## 2023-10-16 DIAGNOSIS — I49.01 VENTRICULAR FIBRILLATION (H): ICD-10-CM

## 2023-10-16 DIAGNOSIS — I42.0 DILATED CARDIOMYOPATHY (H): ICD-10-CM

## 2023-10-16 DIAGNOSIS — Z95.810 ICD (IMPLANTABLE CARDIOVERTER-DEFIBRILLATOR) IN PLACE: ICD-10-CM

## 2023-10-17 LAB
MDC_IDC_EPISODE_DTM: NORMAL
MDC_IDC_EPISODE_DURATION: 11 S
MDC_IDC_EPISODE_DURATION: 12 S
MDC_IDC_EPISODE_DURATION: 13 S
MDC_IDC_EPISODE_DURATION: 16 S
MDC_IDC_EPISODE_ID: NORMAL
MDC_IDC_EPISODE_TYPE: NORMAL
MDC_IDC_EPISODE_TYPE_INDUCED: NO
MDC_IDC_EPISODE_VENDOR_TYPE: NORMAL
MDC_IDC_LEAD_CONNECTION_STATUS: NORMAL
MDC_IDC_LEAD_CONNECTION_STATUS: NORMAL
MDC_IDC_LEAD_IMPLANT_DT: NORMAL
MDC_IDC_LEAD_IMPLANT_DT: NORMAL
MDC_IDC_LEAD_LOCATION: NORMAL
MDC_IDC_LEAD_LOCATION: NORMAL
MDC_IDC_LEAD_LOCATION_DETAIL_1: NORMAL
MDC_IDC_LEAD_LOCATION_DETAIL_1: NORMAL
MDC_IDC_LEAD_MFG: NORMAL
MDC_IDC_LEAD_MFG: NORMAL
MDC_IDC_LEAD_MODEL: NORMAL
MDC_IDC_LEAD_MODEL: NORMAL
MDC_IDC_LEAD_POLARITY_TYPE: NORMAL
MDC_IDC_LEAD_POLARITY_TYPE: NORMAL
MDC_IDC_LEAD_SERIAL: NORMAL
MDC_IDC_LEAD_SERIAL: NORMAL
MDC_IDC_MSMT_BATTERY_DTM: NORMAL
MDC_IDC_MSMT_BATTERY_REMAINING_LONGEVITY: 60 MO
MDC_IDC_MSMT_BATTERY_REMAINING_PERCENTAGE: 70 %
MDC_IDC_MSMT_BATTERY_STATUS: NORMAL
MDC_IDC_MSMT_CAP_CHARGE_DTM: NORMAL
MDC_IDC_MSMT_CAP_CHARGE_TIME: 11.1 S
MDC_IDC_MSMT_CAP_CHARGE_TYPE: NORMAL
MDC_IDC_MSMT_LEADCHNL_RA_IMPEDANCE_VALUE: 664 OHM
MDC_IDC_MSMT_LEADCHNL_RA_PACING_THRESHOLD_AMPLITUDE: 1 V
MDC_IDC_MSMT_LEADCHNL_RA_PACING_THRESHOLD_PULSEWIDTH: 0.4 MS
MDC_IDC_MSMT_LEADCHNL_RV_IMPEDANCE_VALUE: 799 OHM
MDC_IDC_MSMT_LEADCHNL_RV_PACING_THRESHOLD_AMPLITUDE: 0.7 V
MDC_IDC_MSMT_LEADCHNL_RV_PACING_THRESHOLD_PULSEWIDTH: 0.4 MS
MDC_IDC_PG_IMPLANT_DTM: NORMAL
MDC_IDC_PG_MFG: NORMAL
MDC_IDC_PG_MODEL: NORMAL
MDC_IDC_PG_SERIAL: NORMAL
MDC_IDC_PG_TYPE: NORMAL
MDC_IDC_SESS_CLINIC_NAME: NORMAL
MDC_IDC_SESS_DTM: NORMAL
MDC_IDC_SESS_TYPE: NORMAL
MDC_IDC_SET_BRADY_AT_MODE_SWITCH_RATE: 160 {BEATS}/MIN
MDC_IDC_SET_BRADY_LOWRATE: 60 {BEATS}/MIN
MDC_IDC_SET_BRADY_MAX_SENSOR_RATE: 120 {BEATS}/MIN
MDC_IDC_SET_BRADY_MODE: NORMAL
MDC_IDC_SET_LEADCHNL_RA_PACING_POLARITY: NORMAL
MDC_IDC_SET_LEADCHNL_RA_SENSING_ADAPTATION_MODE: NORMAL
MDC_IDC_SET_LEADCHNL_RA_SENSING_POLARITY: NORMAL
MDC_IDC_SET_LEADCHNL_RA_SENSING_SENSITIVITY: 0.15 MV
MDC_IDC_SET_LEADCHNL_RV_PACING_AMPLITUDE: 1.5 V
MDC_IDC_SET_LEADCHNL_RV_PACING_POLARITY: NORMAL
MDC_IDC_SET_LEADCHNL_RV_PACING_PULSEWIDTH: 0.4 MS
MDC_IDC_SET_LEADCHNL_RV_SENSING_ADAPTATION_MODE: NORMAL
MDC_IDC_SET_LEADCHNL_RV_SENSING_POLARITY: NORMAL
MDC_IDC_SET_LEADCHNL_RV_SENSING_SENSITIVITY: 0.6 MV
MDC_IDC_SET_ZONE_DETECTION_INTERVAL: 250 MS
MDC_IDC_SET_ZONE_DETECTION_INTERVAL: 324 MS
MDC_IDC_SET_ZONE_STATUS: NORMAL
MDC_IDC_SET_ZONE_STATUS: NORMAL
MDC_IDC_SET_ZONE_TYPE: NORMAL
MDC_IDC_SET_ZONE_TYPE: NORMAL
MDC_IDC_SET_ZONE_VENDOR_TYPE: NORMAL
MDC_IDC_SET_ZONE_VENDOR_TYPE: NORMAL
MDC_IDC_STAT_BRADY_DTM_END: NORMAL
MDC_IDC_STAT_BRADY_DTM_START: NORMAL
MDC_IDC_STAT_BRADY_RA_PERCENT_PACED: 0 %
MDC_IDC_STAT_BRADY_RV_PERCENT_PACED: 37 %
MDC_IDC_STAT_EPISODE_RECENT_COUNT: 0
MDC_IDC_STAT_EPISODE_RECENT_COUNT: 13
MDC_IDC_STAT_EPISODE_RECENT_COUNT_DTM_END: NORMAL
MDC_IDC_STAT_EPISODE_RECENT_COUNT_DTM_START: NORMAL
MDC_IDC_STAT_EPISODE_TYPE: NORMAL
MDC_IDC_STAT_EPISODE_VENDOR_TYPE: NORMAL
MDC_IDC_STAT_TACHYTHERAPY_ATP_DELIVERED_RECENT: 0
MDC_IDC_STAT_TACHYTHERAPY_ATP_DELIVERED_TOTAL: 3
MDC_IDC_STAT_TACHYTHERAPY_RECENT_DTM_END: NORMAL
MDC_IDC_STAT_TACHYTHERAPY_RECENT_DTM_START: NORMAL
MDC_IDC_STAT_TACHYTHERAPY_SHOCKS_ABORTED_RECENT: 0
MDC_IDC_STAT_TACHYTHERAPY_SHOCKS_ABORTED_TOTAL: 0
MDC_IDC_STAT_TACHYTHERAPY_SHOCKS_DELIVERED_RECENT: 0
MDC_IDC_STAT_TACHYTHERAPY_SHOCKS_DELIVERED_TOTAL: 0
MDC_IDC_STAT_TACHYTHERAPY_TOTAL_DTM_END: NORMAL
MDC_IDC_STAT_TACHYTHERAPY_TOTAL_DTM_START: NORMAL

## 2023-10-17 PROCEDURE — 93295 DEV INTERROG REMOTE 1/2/MLT: CPT | Performed by: INTERNAL MEDICINE

## 2023-10-17 PROCEDURE — 93296 REM INTERROG EVL PM/IDS: CPT | Performed by: INTERNAL MEDICINE

## 2023-11-16 ENCOUNTER — LAB REQUISITION (OUTPATIENT)
Dept: LAB | Facility: CLINIC | Age: 68
End: 2023-11-16
Payer: COMMERCIAL

## 2023-11-16 ENCOUNTER — HOSPITAL ENCOUNTER (OUTPATIENT)
Facility: CLINIC | Age: 68
Discharge: HOME | End: 2023-11-16
Admitting: NURSE PRACTITIONER
Payer: COMMERCIAL

## 2023-11-16 DIAGNOSIS — Z12.5 ENCOUNTER FOR SCREENING FOR MALIGNANT NEOPLASM OF PROSTATE: ICD-10-CM

## 2023-11-16 DIAGNOSIS — E78.2 MIXED HYPERLIPIDEMIA: ICD-10-CM

## 2023-11-16 DIAGNOSIS — E03.9 HYPOTHYROIDISM, UNSPECIFIED: ICD-10-CM

## 2023-11-16 PROCEDURE — 84443 ASSAY THYROID STIM HORMONE: CPT | Mod: ORL | Performed by: NURSE PRACTITIONER

## 2023-11-16 PROCEDURE — 84439 ASSAY OF FREE THYROXINE: CPT | Performed by: NURSE PRACTITIONER

## 2023-11-16 PROCEDURE — G0103 PSA SCREENING: HCPCS | Mod: ORL | Performed by: NURSE PRACTITIONER

## 2023-11-16 PROCEDURE — 80053 COMPREHEN METABOLIC PANEL: CPT | Mod: ORL | Performed by: NURSE PRACTITIONER

## 2023-11-16 PROCEDURE — 80061 LIPID PANEL: CPT | Performed by: NURSE PRACTITIONER

## 2023-11-17 LAB
ALBUMIN SERPL BCG-MCNC: 4 G/DL (ref 3.5–5.2)
ALP SERPL-CCNC: 120 U/L (ref 40–150)
ALT SERPL W P-5'-P-CCNC: 24 U/L (ref 0–70)
ANION GAP SERPL CALCULATED.3IONS-SCNC: 13 MMOL/L (ref 7–15)
AST SERPL W P-5'-P-CCNC: 35 U/L (ref 0–45)
BILIRUB SERPL-MCNC: 0.5 MG/DL
BUN SERPL-MCNC: 38.4 MG/DL (ref 8–23)
CALCIUM SERPL-MCNC: 9.5 MG/DL (ref 8.8–10.2)
CHLORIDE SERPL-SCNC: 104 MMOL/L (ref 98–107)
CHOLEST SERPL-MCNC: 181 MG/DL
CREAT SERPL-MCNC: 2.24 MG/DL (ref 0.67–1.17)
DEPRECATED HCO3 PLAS-SCNC: 22 MMOL/L (ref 22–29)
EGFRCR SERPLBLD CKD-EPI 2021: 31 ML/MIN/1.73M2
GLUCOSE SERPL-MCNC: 98 MG/DL (ref 70–99)
HDLC SERPL-MCNC: 44 MG/DL
LDLC SERPL CALC-MCNC: 66 MG/DL
NONHDLC SERPL-MCNC: 137 MG/DL
POTASSIUM SERPL-SCNC: 4.1 MMOL/L (ref 3.4–5.3)
PROT SERPL-MCNC: 7.3 G/DL (ref 6.4–8.3)
PSA SERPL DL<=0.01 NG/ML-MCNC: 1.2 NG/ML (ref 0–4.5)
SODIUM SERPL-SCNC: 139 MMOL/L (ref 135–145)
T4 FREE SERPL-MCNC: 1.78 NG/DL (ref 0.9–1.7)
TRIGL SERPL-MCNC: 355 MG/DL
TSH SERPL DL<=0.005 MIU/L-ACNC: 1.8 UIU/ML (ref 0.3–4.2)

## 2023-11-29 ENCOUNTER — HOSPITAL ENCOUNTER (OUTPATIENT)
Facility: CLINIC | Age: 68
Discharge: HOME | End: 2023-11-29
Admitting: NURSE PRACTITIONER
Payer: COMMERCIAL

## 2023-11-29 ENCOUNTER — LAB REQUISITION (OUTPATIENT)
Dept: LAB | Facility: CLINIC | Age: 68
End: 2023-11-29
Payer: COMMERCIAL

## 2023-11-29 DIAGNOSIS — N18.31 CHRONIC KIDNEY DISEASE, STAGE 3A (H): ICD-10-CM

## 2023-11-29 PROCEDURE — 80048 BASIC METABOLIC PNL TOTAL CA: CPT | Performed by: NURSE PRACTITIONER

## 2023-11-30 LAB
ANION GAP SERPL CALCULATED.3IONS-SCNC: 15 MMOL/L (ref 7–15)
BUN SERPL-MCNC: 38.2 MG/DL (ref 8–23)
CALCIUM SERPL-MCNC: 9.4 MG/DL (ref 8.8–10.2)
CHLORIDE SERPL-SCNC: 106 MMOL/L (ref 98–107)
CREAT SERPL-MCNC: 2.06 MG/DL (ref 0.67–1.17)
DEPRECATED HCO3 PLAS-SCNC: 19 MMOL/L (ref 22–29)
EGFRCR SERPLBLD CKD-EPI 2021: 34 ML/MIN/1.73M2
GLUCOSE SERPL-MCNC: 172 MG/DL (ref 70–99)
POTASSIUM SERPL-SCNC: 3.5 MMOL/L (ref 3.4–5.3)
SODIUM SERPL-SCNC: 140 MMOL/L (ref 135–145)

## 2024-01-24 ENCOUNTER — ANCILLARY PROCEDURE (OUTPATIENT)
Dept: CARDIOLOGY | Facility: CLINIC | Age: 69
End: 2024-01-24
Attending: INTERNAL MEDICINE
Payer: COMMERCIAL

## 2024-01-24 DIAGNOSIS — Z95.810 ICD (IMPLANTABLE CARDIOVERTER-DEFIBRILLATOR) IN PLACE: Primary | ICD-10-CM

## 2024-01-24 DIAGNOSIS — I48.21 PERMANENT ATRIAL FIBRILLATION (H): ICD-10-CM

## 2024-01-24 DIAGNOSIS — Z95.810 ICD (IMPLANTABLE CARDIOVERTER-DEFIBRILLATOR) IN PLACE: ICD-10-CM

## 2024-01-24 DIAGNOSIS — I49.01 VENTRICULAR FIBRILLATION (H): ICD-10-CM

## 2024-01-24 DIAGNOSIS — I48.19 PERSISTENT ATRIAL FIBRILLATION (H): ICD-10-CM

## 2024-01-24 DIAGNOSIS — I42.0 DILATED CARDIOMYOPATHY (H): ICD-10-CM

## 2024-01-24 LAB
MDC_IDC_EPISODE_DTM: NORMAL
MDC_IDC_EPISODE_DURATION: 12 S
MDC_IDC_EPISODE_DURATION: 13 S
MDC_IDC_EPISODE_DURATION: 14 S
MDC_IDC_EPISODE_DURATION: 14 S
MDC_IDC_EPISODE_DURATION: 15 S
MDC_IDC_EPISODE_DURATION: 16 S
MDC_IDC_EPISODE_DURATION: 16 S
MDC_IDC_EPISODE_DURATION: 18 S
MDC_IDC_EPISODE_DURATION: 21 S
MDC_IDC_EPISODE_ID: NORMAL
MDC_IDC_EPISODE_TYPE: NORMAL
MDC_IDC_EPISODE_TYPE_INDUCED: NO
MDC_IDC_EPISODE_VENDOR_TYPE: NORMAL
MDC_IDC_LEAD_CONNECTION_STATUS: NORMAL
MDC_IDC_LEAD_CONNECTION_STATUS: NORMAL
MDC_IDC_LEAD_IMPLANT_DT: NORMAL
MDC_IDC_LEAD_IMPLANT_DT: NORMAL
MDC_IDC_LEAD_LOCATION: NORMAL
MDC_IDC_LEAD_LOCATION: NORMAL
MDC_IDC_LEAD_LOCATION_DETAIL_1: NORMAL
MDC_IDC_LEAD_LOCATION_DETAIL_1: NORMAL
MDC_IDC_LEAD_MFG: NORMAL
MDC_IDC_LEAD_MFG: NORMAL
MDC_IDC_LEAD_MODEL: NORMAL
MDC_IDC_LEAD_MODEL: NORMAL
MDC_IDC_LEAD_POLARITY_TYPE: NORMAL
MDC_IDC_LEAD_POLARITY_TYPE: NORMAL
MDC_IDC_LEAD_SERIAL: NORMAL
MDC_IDC_LEAD_SERIAL: NORMAL
MDC_IDC_MSMT_BATTERY_DTM: NORMAL
MDC_IDC_MSMT_BATTERY_REMAINING_LONGEVITY: 60 MO
MDC_IDC_MSMT_BATTERY_REMAINING_PERCENTAGE: 66 %
MDC_IDC_MSMT_BATTERY_STATUS: NORMAL
MDC_IDC_MSMT_CAP_CHARGE_DTM: NORMAL
MDC_IDC_MSMT_CAP_CHARGE_TIME: 11.3 S
MDC_IDC_MSMT_CAP_CHARGE_TYPE: NORMAL
MDC_IDC_MSMT_LEADCHNL_RA_IMPEDANCE_VALUE: 640 OHM
MDC_IDC_MSMT_LEADCHNL_RA_PACING_THRESHOLD_AMPLITUDE: 1 V
MDC_IDC_MSMT_LEADCHNL_RA_PACING_THRESHOLD_PULSEWIDTH: 0.4 MS
MDC_IDC_MSMT_LEADCHNL_RV_IMPEDANCE_VALUE: 705 OHM
MDC_IDC_MSMT_LEADCHNL_RV_PACING_THRESHOLD_AMPLITUDE: 0.7 V
MDC_IDC_MSMT_LEADCHNL_RV_PACING_THRESHOLD_PULSEWIDTH: 0.4 MS
MDC_IDC_PG_IMPLANT_DTM: NORMAL
MDC_IDC_PG_MFG: NORMAL
MDC_IDC_PG_MODEL: NORMAL
MDC_IDC_PG_SERIAL: NORMAL
MDC_IDC_PG_TYPE: NORMAL
MDC_IDC_SESS_CLINIC_NAME: NORMAL
MDC_IDC_SESS_DTM: NORMAL
MDC_IDC_SESS_TYPE: NORMAL
MDC_IDC_SET_BRADY_AT_MODE_SWITCH_RATE: 160 {BEATS}/MIN
MDC_IDC_SET_BRADY_LOWRATE: 60 {BEATS}/MIN
MDC_IDC_SET_BRADY_MAX_SENSOR_RATE: 120 {BEATS}/MIN
MDC_IDC_SET_BRADY_MODE: NORMAL
MDC_IDC_SET_LEADCHNL_RA_PACING_POLARITY: NORMAL
MDC_IDC_SET_LEADCHNL_RA_SENSING_ADAPTATION_MODE: NORMAL
MDC_IDC_SET_LEADCHNL_RA_SENSING_POLARITY: NORMAL
MDC_IDC_SET_LEADCHNL_RA_SENSING_SENSITIVITY: 0.15 MV
MDC_IDC_SET_LEADCHNL_RV_PACING_AMPLITUDE: 1.5 V
MDC_IDC_SET_LEADCHNL_RV_PACING_POLARITY: NORMAL
MDC_IDC_SET_LEADCHNL_RV_PACING_PULSEWIDTH: 0.4 MS
MDC_IDC_SET_LEADCHNL_RV_SENSING_ADAPTATION_MODE: NORMAL
MDC_IDC_SET_LEADCHNL_RV_SENSING_POLARITY: NORMAL
MDC_IDC_SET_LEADCHNL_RV_SENSING_SENSITIVITY: 0.6 MV
MDC_IDC_SET_ZONE_DETECTION_INTERVAL: 250 MS
MDC_IDC_SET_ZONE_DETECTION_INTERVAL: 324 MS
MDC_IDC_SET_ZONE_STATUS: NORMAL
MDC_IDC_SET_ZONE_STATUS: NORMAL
MDC_IDC_SET_ZONE_TYPE: NORMAL
MDC_IDC_SET_ZONE_TYPE: NORMAL
MDC_IDC_SET_ZONE_VENDOR_TYPE: NORMAL
MDC_IDC_SET_ZONE_VENDOR_TYPE: NORMAL
MDC_IDC_STAT_BRADY_DTM_END: NORMAL
MDC_IDC_STAT_BRADY_DTM_START: NORMAL
MDC_IDC_STAT_BRADY_RA_PERCENT_PACED: 0 %
MDC_IDC_STAT_BRADY_RV_PERCENT_PACED: 37 %
MDC_IDC_STAT_EPISODE_RECENT_COUNT: 0
MDC_IDC_STAT_EPISODE_RECENT_COUNT: 11
MDC_IDC_STAT_EPISODE_RECENT_COUNT_DTM_END: NORMAL
MDC_IDC_STAT_EPISODE_RECENT_COUNT_DTM_START: NORMAL
MDC_IDC_STAT_EPISODE_TYPE: NORMAL
MDC_IDC_STAT_EPISODE_VENDOR_TYPE: NORMAL
MDC_IDC_STAT_TACHYTHERAPY_ATP_DELIVERED_RECENT: 0
MDC_IDC_STAT_TACHYTHERAPY_ATP_DELIVERED_TOTAL: 3
MDC_IDC_STAT_TACHYTHERAPY_RECENT_DTM_END: NORMAL
MDC_IDC_STAT_TACHYTHERAPY_RECENT_DTM_START: NORMAL
MDC_IDC_STAT_TACHYTHERAPY_SHOCKS_ABORTED_RECENT: 0
MDC_IDC_STAT_TACHYTHERAPY_SHOCKS_ABORTED_TOTAL: 0
MDC_IDC_STAT_TACHYTHERAPY_SHOCKS_DELIVERED_RECENT: 0
MDC_IDC_STAT_TACHYTHERAPY_SHOCKS_DELIVERED_TOTAL: 0
MDC_IDC_STAT_TACHYTHERAPY_TOTAL_DTM_END: NORMAL
MDC_IDC_STAT_TACHYTHERAPY_TOTAL_DTM_START: NORMAL

## 2024-01-24 PROCEDURE — 93295 DEV INTERROG REMOTE 1/2/MLT: CPT | Performed by: INTERNAL MEDICINE

## 2024-01-24 PROCEDURE — 93296 REM INTERROG EVL PM/IDS: CPT | Performed by: INTERNAL MEDICINE

## 2024-05-08 ENCOUNTER — APPOINTMENT (OUTPATIENT)
Dept: INTERPRETER SERVICES | Facility: CLINIC | Age: 69
End: 2024-05-08
Payer: COMMERCIAL

## 2024-05-16 ENCOUNTER — LAB REQUISITION (OUTPATIENT)
Dept: LAB | Facility: CLINIC | Age: 69
End: 2024-05-16
Payer: COMMERCIAL

## 2024-05-16 DIAGNOSIS — E03.9 HYPOTHYROIDISM, UNSPECIFIED: ICD-10-CM

## 2024-05-16 DIAGNOSIS — E55.9 VITAMIN D DEFICIENCY, UNSPECIFIED: ICD-10-CM

## 2024-05-16 DIAGNOSIS — I10 ESSENTIAL (PRIMARY) HYPERTENSION: ICD-10-CM

## 2024-05-16 PROCEDURE — 80061 LIPID PANEL: CPT | Mod: ORL | Performed by: NURSE PRACTITIONER

## 2024-05-16 PROCEDURE — 82306 VITAMIN D 25 HYDROXY: CPT | Mod: ORL | Performed by: NURSE PRACTITIONER

## 2024-05-16 PROCEDURE — 84439 ASSAY OF FREE THYROXINE: CPT | Mod: ORL | Performed by: NURSE PRACTITIONER

## 2024-05-16 PROCEDURE — 80053 COMPREHEN METABOLIC PANEL: CPT | Mod: ORL | Performed by: NURSE PRACTITIONER

## 2024-05-16 PROCEDURE — 84443 ASSAY THYROID STIM HORMONE: CPT | Mod: ORL | Performed by: NURSE PRACTITIONER

## 2024-05-17 LAB
ALBUMIN SERPL BCG-MCNC: 3.9 G/DL (ref 3.5–5.2)
ALP SERPL-CCNC: 126 U/L (ref 40–150)
ALT SERPL W P-5'-P-CCNC: 17 U/L (ref 0–70)
ANION GAP SERPL CALCULATED.3IONS-SCNC: 12 MMOL/L (ref 7–15)
AST SERPL W P-5'-P-CCNC: 38 U/L (ref 0–45)
BILIRUB SERPL-MCNC: 0.6 MG/DL
BUN SERPL-MCNC: 44.3 MG/DL (ref 8–23)
CALCIUM SERPL-MCNC: 9 MG/DL (ref 8.8–10.2)
CHLORIDE SERPL-SCNC: 105 MMOL/L (ref 98–107)
CHOLEST SERPL-MCNC: 146 MG/DL
CREAT SERPL-MCNC: 2.48 MG/DL (ref 0.67–1.17)
DEPRECATED HCO3 PLAS-SCNC: 23 MMOL/L (ref 22–29)
EGFRCR SERPLBLD CKD-EPI 2021: 27 ML/MIN/1.73M2
FASTING STATUS PATIENT QL REPORTED: ABNORMAL
FASTING STATUS PATIENT QL REPORTED: ABNORMAL
GLUCOSE SERPL-MCNC: 102 MG/DL (ref 70–99)
HDLC SERPL-MCNC: 43 MG/DL
LDLC SERPL CALC-MCNC: 67 MG/DL
NONHDLC SERPL-MCNC: 103 MG/DL
POTASSIUM SERPL-SCNC: 3.8 MMOL/L (ref 3.4–5.3)
PROT SERPL-MCNC: 7.4 G/DL (ref 6.4–8.3)
SODIUM SERPL-SCNC: 140 MMOL/L (ref 135–145)
T4 FREE SERPL-MCNC: 1.72 NG/DL (ref 0.9–1.7)
TRIGL SERPL-MCNC: 178 MG/DL
TSH SERPL DL<=0.005 MIU/L-ACNC: 2.35 UIU/ML (ref 0.3–4.2)
VIT D+METAB SERPL-MCNC: 30 NG/ML (ref 20–50)

## 2024-05-24 ENCOUNTER — LAB REQUISITION (OUTPATIENT)
Dept: LAB | Facility: CLINIC | Age: 69
End: 2024-05-24
Payer: COMMERCIAL

## 2024-05-24 DIAGNOSIS — I10 ESSENTIAL (PRIMARY) HYPERTENSION: ICD-10-CM

## 2024-05-24 PROCEDURE — 80053 COMPREHEN METABOLIC PANEL: CPT | Mod: ORL | Performed by: NURSE PRACTITIONER

## 2024-05-25 LAB
ALBUMIN SERPL BCG-MCNC: 3.6 G/DL (ref 3.5–5.2)
ALP SERPL-CCNC: 132 U/L (ref 40–150)
ALT SERPL W P-5'-P-CCNC: 22 U/L (ref 0–70)
ANION GAP SERPL CALCULATED.3IONS-SCNC: 13 MMOL/L (ref 7–15)
AST SERPL W P-5'-P-CCNC: 39 U/L (ref 0–45)
BILIRUB SERPL-MCNC: 0.8 MG/DL
BUN SERPL-MCNC: 40.8 MG/DL (ref 8–23)
CALCIUM SERPL-MCNC: 9.6 MG/DL (ref 8.8–10.2)
CHLORIDE SERPL-SCNC: 104 MMOL/L (ref 98–107)
CREAT SERPL-MCNC: 2.37 MG/DL (ref 0.67–1.17)
DEPRECATED HCO3 PLAS-SCNC: 23 MMOL/L (ref 22–29)
EGFRCR SERPLBLD CKD-EPI 2021: 29 ML/MIN/1.73M2
GLUCOSE SERPL-MCNC: 88 MG/DL (ref 70–99)
POTASSIUM SERPL-SCNC: 3.3 MMOL/L (ref 3.4–5.3)
PROT SERPL-MCNC: 7.1 G/DL (ref 6.4–8.3)
SODIUM SERPL-SCNC: 140 MMOL/L (ref 135–145)

## 2024-07-03 ENCOUNTER — DOCUMENTATION ONLY (OUTPATIENT)
Dept: CARDIOLOGY | Facility: CLINIC | Age: 69
End: 2024-07-03
Payer: COMMERCIAL

## 2024-07-03 NOTE — PROGRESS NOTES
Patient has not scheduled a clinic appointment with [facility] since 4/21/2023. Multiple attempts to reach the patient have been unsuccessful. Sent letter to schedule office visit or to update our clinic if patient is being seen elsewhere. Delinquent letter was sent per protocol asking the patient to update our clinic or will be made inactive 30 days after the letter has been sent.        Serina Rivers  Device Tech  [phone]

## 2024-08-08 ENCOUNTER — DOCUMENTATION ONLY (OUTPATIENT)
Dept: CARDIOLOGY | Facility: CLINIC | Age: 69
End: 2024-08-08
Payer: COMMERCIAL

## 2024-08-08 NOTE — PROGRESS NOTES
Patient did not respond to the delinquent letter sent 7/3/2024. After 30 days of letter and no response, patient has been made inactive in [employer] database. This does not affect the function of their implanted device, however unable to monitor status. Patient is welcome to re-establish with [facility] anytime patient wishes.       Sreina Rivers  Device Tech  [phone]

## 2024-11-19 ENCOUNTER — LAB REQUISITION (OUTPATIENT)
Dept: LAB | Facility: CLINIC | Age: 69
End: 2024-11-19
Payer: COMMERCIAL

## 2024-11-19 ENCOUNTER — TRANSFERRED RECORDS (OUTPATIENT)
Dept: HEALTH INFORMATION MANAGEMENT | Facility: CLINIC | Age: 69
End: 2024-11-19

## 2024-11-19 DIAGNOSIS — I13.0 HYPERTENSIVE HEART AND CHRONIC KIDNEY DISEASE WITH HEART FAILURE AND STAGE 1 THROUGH STAGE 4 CHRONIC KIDNEY DISEASE, OR UNSPECIFIED CHRONIC KIDNEY DISEASE (H): ICD-10-CM

## 2024-11-20 ENCOUNTER — APPOINTMENT (OUTPATIENT)
Dept: INTERPRETER SERVICES | Facility: CLINIC | Age: 69
End: 2024-11-20
Payer: COMMERCIAL

## 2024-11-20 ENCOUNTER — MEDICAL CORRESPONDENCE (OUTPATIENT)
Dept: HEALTH INFORMATION MANAGEMENT | Facility: CLINIC | Age: 69
End: 2024-11-20
Payer: COMMERCIAL

## 2024-11-20 LAB
ALBUMIN SERPL BCG-MCNC: 3.5 G/DL (ref 3.5–5.2)
ALP SERPL-CCNC: 115 U/L (ref 40–150)
ALT SERPL W P-5'-P-CCNC: 12 U/L (ref 0–70)
ANION GAP SERPL CALCULATED.3IONS-SCNC: 14 MMOL/L (ref 7–15)
AST SERPL W P-5'-P-CCNC: 27 U/L (ref 0–45)
BILIRUB SERPL-MCNC: 0.5 MG/DL
BUN SERPL-MCNC: 60.4 MG/DL (ref 8–23)
CALCIUM SERPL-MCNC: 9.2 MG/DL (ref 8.8–10.4)
CHLORIDE SERPL-SCNC: 106 MMOL/L (ref 98–107)
CREAT SERPL-MCNC: 2.73 MG/DL (ref 0.67–1.17)
EGFRCR SERPLBLD CKD-EPI 2021: 24 ML/MIN/1.73M2
GLUCOSE SERPL-MCNC: 96 MG/DL (ref 70–99)
HCO3 SERPL-SCNC: 19 MMOL/L (ref 22–29)
POTASSIUM SERPL-SCNC: 3.6 MMOL/L (ref 3.4–5.3)
PROT SERPL-MCNC: 7.4 G/DL (ref 6.4–8.3)
SODIUM SERPL-SCNC: 139 MMOL/L (ref 135–145)

## 2025-01-14 ENCOUNTER — ANCILLARY PROCEDURE (OUTPATIENT)
Dept: CARDIOLOGY | Facility: CLINIC | Age: 70
End: 2025-01-14
Attending: INTERNAL MEDICINE
Payer: COMMERCIAL

## 2025-01-14 VITALS
RESPIRATION RATE: 16 BRPM | SYSTOLIC BLOOD PRESSURE: 120 MMHG | HEART RATE: 60 BPM | WEIGHT: 171 LBS | DIASTOLIC BLOOD PRESSURE: 74 MMHG | HEIGHT: 64 IN | BODY MASS INDEX: 29.19 KG/M2

## 2025-01-14 DIAGNOSIS — I49.01 VENTRICULAR FIBRILLATION (H): ICD-10-CM

## 2025-01-14 DIAGNOSIS — Z95.810 ICD (IMPLANTABLE CARDIOVERTER-DEFIBRILLATOR) IN PLACE: ICD-10-CM

## 2025-01-14 DIAGNOSIS — I49.01 VF (VENTRICULAR FIBRILLATION) (H): ICD-10-CM

## 2025-01-14 DIAGNOSIS — I50.84 END STAGE CONGESTIVE HEART FAILURE (H): ICD-10-CM

## 2025-01-14 DIAGNOSIS — E78.5 DYSLIPIDEMIA, GOAL LDL BELOW 70: Chronic | ICD-10-CM

## 2025-01-14 DIAGNOSIS — I25.5 ISCHEMIC CARDIOMYOPATHY: ICD-10-CM

## 2025-01-14 DIAGNOSIS — I48.21 PERMANENT ATRIAL FIBRILLATION (H): ICD-10-CM

## 2025-01-14 DIAGNOSIS — I48.19 PERSISTENT ATRIAL FIBRILLATION (H): ICD-10-CM

## 2025-01-14 DIAGNOSIS — I25.10 CORONARY ARTERY DISEASE INVOLVING NATIVE CORONARY ARTERY OF NATIVE HEART WITHOUT ANGINA PECTORIS: Primary | ICD-10-CM

## 2025-01-14 DIAGNOSIS — I42.0 DILATED CARDIOMYOPATHY (H): ICD-10-CM

## 2025-01-14 DIAGNOSIS — I10 BENIGN ESSENTIAL HYPERTENSION: ICD-10-CM

## 2025-01-14 DIAGNOSIS — N18.4 CKD (CHRONIC KIDNEY DISEASE) STAGE 4, GFR 15-29 ML/MIN (H): ICD-10-CM

## 2025-01-14 PROCEDURE — 99214 OFFICE O/P EST MOD 30 MIN: CPT | Performed by: INTERNAL MEDICINE

## 2025-01-14 NOTE — LETTER
1/14/2025    Jessica Ventura, NP  [address] [place] [address]    RE: Dillon Gorge       Dear Colleague,     I had the pleasure of seeing Dillon Gorge in the [facility].            Assessment/Plan:   Coronary artery disease status post DEJUAN to LAD and LCx in April 2018: The patient denies chest pain or shortness of breath.  Echocardiogram in 2022 was reported recovered LV systolic function to 60%.  Continue Plavix, metoprolol succinate and atorvastatin.  Ischemic cardiomyopathy, LVEF improved from 25% to 60%, chronic systolic congestive heart failure: The patient is compensated.  Physical examination indicated a few crackles in left lower base.  No obvious signs of fluid retention.  The patient has no complaints of shortness of breath.  Continue furosemide 20 mg daily.  Laboratory test is reviewed.  The patient had stage IV CKD, follow-up with nephrology clinic.  Status post dual-chamber ICD placement due to ventricular fibrillation: The patient had no ventricular fibrillation based on ICD interrogation.  Continue follow-up with device clinic.  Persistent atrial fibrillation: His ventricular rate is well-controlled.  Continue diltiazem  mg daily, metoprolol succinate 100 mg daily. His [id number] score is 4.  Continue Xarelto 15 mg at suppertime.  The patient is on Plavix due to coronary artery disease and multiple stent placement.  May consider to stop her Plavix in the future.  Will discuss Watchman device instead of chronic anticoagulation at next visit.  Benign essential hypertension: His blood pressure is well-controlled with diltiazem ER and metoprolol succinate, hydralazine.  Dyslipidemia: Continue atorvastatin 80 mg at bedtime.  LDL was well-controlled.  Stage IV CKD: Follow-up with nephrology clinic.    The patient did not bring his medication with him.  Advised him to bring all his medication bottles with him at next visit.    Thank you for the opportunity to be involved in the  care of Dillon Pennington. If you have any questions, please feel free to contact me.  I will see the patient again in 6 months and as needed.    Much or all of the text in this note was generated through the use of Dragon Dictate voice-to-text software. Errors in spelling or words which seem out of context are unintentional. Sound alike errors, in particular, may have escaped editing.       History of Present Illness:   It is my pleasure to see Dillon Pennington at the [facility] clinic for routine cardiology follow up.  Dillon Pennington is a 69 year old male with a medical history of coronary artery disease status post DEJUAN to LAD and LCx in April 2018, ischemic cardiomyopathy with LVEF 25% to 60%, chronic systolic congestive heart failure, status post ICD placement due to ventricular fibrillation, persistent atrial fibrillation, benign essential hypertension, dyslipidemia, stage IV CKD.    The patient does not speak English.  His medical history is interpreted by professional [employer] .  He has followed up with Dr. Russo in the past.  Now he presents to cardiology clinic for routine cardiology follow-up.    He denies chest pain, shortness of breath on exertion, palpitations, dizziness, orthopnea, PND or leg edema.  He states that he has been doing fine over last 6 months.  His blood pressure and heart rate are controlled.    He has ICD interrogation today which showed persistent atrial fibrillation with very short episode of rapid ventricular response, well-controlled atrial fibrillation, normal device function.    Past Medical History:     Patient Active Problem List   Diagnosis     Chronic kidney disease (CKD), stage III (moderate) (H)     Chronic systolic heart failure (H)     Dilated cardiomyopathy (H)     Permanent atrial fibrillation     VF (ventricular fibrillation) (H)     Coronary artery disease involving native coronary artery of native heart without angina pectoris     Ventricular tachycardia  (H)     Anemia     Dyslipidemia, goal LDL below 70     ICD (implantable cardioverter-defibrillator) in place, dual chamber     Cough     Food insecurity     Limitation of activities due to disability     Dyspnea     Ischemic cardiomyopathy     End stage congestive heart failure (H)     CKD (chronic kidney disease) stage 4, GFR 15-29 ml/min (H)       Past Surgical History:     Past Surgical History:   Procedure Laterality Date     CHOLECYSTECTOMY       CV CORONARY ANGIOGRAM N/A 4/18/2018    Procedure: Coronary Angiogram;  Surgeon: Heriberto Cheung MD;  Location: [facility];  Service:      CV CORONARY ANGIOGRAM N/A 4/20/2018    Procedure: Coronary Angiogram;  Surgeon: Javon Carrington MD;  Location: [facility];  Service:      CV IMPELLA INSERT N/A 4/20/2018    Procedure: Impella Insert;  Surgeon: Javon Carrington MD;  Location: [facility];  Service:      CV LEFT HEART CATHETERIZATION WITHOUT LEFT VENTRICULOGRAM Left 4/18/2018    Procedure: Left Heart Catheterization Without Left Ventriculogram;  Surgeon: Heriberto Cheung MD;  Location: [facility];  Service:      EP ICD INSERT N/A 4/23/2018    Procedure: EP ICD Insert;  Surgeon: Rakesh Rivera MD;  Location: [facility];  Service:      PICC  4/18/2018            Family History:   No family history on file.     Social History:    reports that he has never smoked. He has never used smokeless tobacco. He reports current alcohol use. He reports that he does not use drugs.    Review of Systems:   12 systems are reviewed negative except for in HPI.    Meds:     Current Outpatient Medications:      acetaminophen (TYLENOL) 325 MG tablet, [ACETAMINOPHEN (TYLENOL) 325 MG TABLET] Take 650 mg by mouth every 6 (six) hours as needed for pain., Disp: , Rfl:      allopurinol (ZYLOPRIM) 300 MG tablet, [ALLOPURINOL (ZYLOPRIM) 300 MG TABLET] Take 300 mg by mouth daily., Disp: , Rfl:      atorvastatin (LIPITOR) 80 MG  tablet, [ATORVASTATIN (LIPITOR) 80 MG TABLET] Take 1 tablet (80 mg total) by mouth daily., Disp: 90 tablet, Rfl: 0     cetirizine (ZYRTEC) 10 MG tablet, [CETIRIZINE (ZYRTEC) 10 MG TABLET] Take 1 tablet by mouth daily., Disp: , Rfl: 0     cholecalciferol, vitamin D3, (VITAMIN D3) 5,000 unit Tab, [CHOLECALCIFEROL, VITAMIN D3, (VITAMIN D3) 5,000 UNIT TAB] Take 5,000 Units by mouth Daily at 5 pm., Disp: , Rfl:      clopidogrel (PLAVIX) 75 MG tablet, TAKE 1 PILL (75 MG TOTAL) BY MOUTH DAILY /TXHUA HNUB NOJ 1 [facility] PAB KOM NTSHAV STEFFANIE, Disp: 90 tablet, Rfl: 1     diltiazem ER COATED BEADS (CARDIZEM CD/CARTIA XT) 180 MG 24 hr capsule, TAKE 1 CAPSULE (180 MG TOTAL) BY MOUTH DAILY/ TXHUA HNUB NOJ 1 [facility] PAB ZOO NTSHAV SIAB, Disp: 90 capsule, Rfl: 1     fluticasone (VERAMYST) 27.5 mcg/actuation nasal spray, Spray 1 spray in nostril nightly as needed , Disp: , Rfl:      furosemide (LASIX) 20 MG tablet, [FUROSEMIDE (LASIX) 20 MG TABLET] Take 1 tablet (20 mg total) by mouth daily., Disp: 90 tablet, Rfl: 0     hydrALAZINE (APRESOLINE) 25 MG tablet, [HYDRALAZINE (APRESOLINE) 25 MG TABLET] TAKE 1 PILL (25 MG TOTAL) BY MOUTH 3 TIMES EVERYDAY/ TXHUA HNUB NOJ 1 [facility] 3 ZAUG, Disp: 270 tablet, Rfl: 2     levothyroxine (SYNTHROID) 88 MCG tablet, [LEVOTHYROXINE (SYNTHROID) 88 MCG TABLET] Take 88 mcg by mouth Daily at 6:00 am. Indications: hypothyroidism, Disp: , Rfl:      nitroGLYcerin (NITROSTAT) 0.4 MG sublingual tablet, DISSOLVE 1 PILL UNDER TONGUE EVERY 5 MINUTES AS NEEDED FOR CHEST PAIN/YOG MOB HAUV SIAB MUAB IB LUB [facility] NAVYA HAUV QAB NPLAIG TXHUA 5 DINA THIS, Disp: 25 tablet, Rfl: 3     XARELTO 15 mg tablet, [XARELTO 15 MG TABLET] TAKE 1 TABLET (15 MG TOTAL) BY MOUTH DAILY WITH SUPPER., Disp: 90 tablet, Rfl: 1     metoprolol succinate (TOPROL-XL) 100 MG 24 hr tablet, [METOPROLOL SUCCINATE (TOPROL-XL) 100 MG 24 HR TABLET] TAKE 2 TABLETS (200 MG TOTAL) BY MOUTH DAILY/ COLTA HNUB NOJ 2 LUB TSHUAJ PAB ZOANH NTSHAV SAMI,  "Disp: 180 tablet, Rfl: 1    Allergies:   Patient has no known allergies.      Objective:      Physical Exam  77.6 kg (171 lb)  1.626 m (5' 4\")  Body mass index is 29.35 kg/m .  /74 (BP Location: Right arm, Patient Position: Sitting, Cuff Size: Adult Regular)   Pulse 60   Resp 16   Ht 1.626 m (5' 4\")   Wt 77.6 kg (171 lb)   BMI 29.35 kg/m      General Appearance:   Awake, Alert, No acute distress.   HEENT:  Pupil equal and reactive to light. No scleral icterus; the mucous membranes were moist.   Neck: No cervical bruits. No JVD. No thyromegaly.     Chest: The spine was straight. The chest was symmetric.   Lungs:   Respirations unlabored; Lungs are clear to auscultation.  Few crackles in left bases. No wheezing.   Cardiovascular:   Irregular rhythm and rate, normal first and second heart sounds with no murmurs. No rubs or gallops.    Abdomen:  Soft. No tenderness. Non-distended. Bowels sounds are present   Extremities: Equal tibial pulses. No leg edema.   Skin: No rashes or ulcers. Warm, Dry.   Musculoskeletal: No tenderness. No deformity.   Neurologic: Mood and affect are appropriate. No focal deficits.         ICD interrogation today: Personally reviewed  Normal device function  Short episodes of atrial fibrillation with rapid ventricular response    Cardiac Imaging Studies  Echocardiogram in February 2022:  The left ventricle is normal in size.  There is mild concentric left ventricular hypertrophy.  The visual ejection fraction is 60-65%.  Septal wall motion abnormality may reflect pacemaker activation.  TAPSE is abnormal, which is consistent with abnormal right ventricular  systolic function.  Pacemaker catheter in the right heart  The left atrium is mildly dilated.  There is mild (1+) mitral regurgitation.  Right ventricle systolic pressure estimate normal  There is mild (1+) aortic regurgitation.  The ascending aorta is Mildly dilated.  Comapred to 7/21/2020 no significant change is observed.LV " "function is mildly  more vigorous on the current study    Lab Review   Lab Results   Component Value Date     11/19/2024    CO2 19 11/19/2024    CO2 23 07/30/2021    BUN 60.4 11/19/2024    BUN 35 07/30/2021     Lab Results   Component Value Date    WBC 7.7 11/10/2022    HGB 16.6 11/10/2022    HCT 49.1 11/10/2022    MCV 94 11/10/2022     11/10/2022     Lab Results   Component Value Date    CHOL 146 05/16/2024    CHOL 181 11/16/2023    CHOL 152 04/21/2023     Lab Results   Component Value Date    HDL 43 05/16/2024    HDL 44 11/16/2023    HDL 35 (L) 04/21/2023     No components found for: \"LDLCALC\"  Lab Results   Component Value Date    TRIG 178 (H) 05/16/2024    TRIG 355 (H) 11/16/2023    TRIG 263 (H) 04/21/2023     No results found for: \"CHOLHDL\"  Lab Results   Component Value Date    TROPONINI <0.01 05/16/2018     Lab Results   Component Value Date     02/19/2020     Lab Results   Component Value Date    TSH 2.35 05/16/2024    TSH 1.69 07/20/2021                 Thank you for allowing me to participate in the care of your patient.      Sincerely,     Alfa Pendleton MD     [facility]  cc:   Alo Saez MD  [address] [facility] [address] [facility] [address]  [place] [address]      "

## 2025-01-14 NOTE — PROGRESS NOTES
Assessment/Plan:   Coronary artery disease status post DEJUAN to LAD and LCx in April 2018: The patient denies chest pain or shortness of breath.  Echocardiogram in 2022 was reported recovered LV systolic function to 60%.  Continue Plavix, metoprolol succinate and atorvastatin.  Ischemic cardiomyopathy, LVEF improved from 25% to 60%, chronic systolic congestive heart failure: The patient is compensated.  Physical examination indicated a few crackles in left lower base.  No obvious signs of fluid retention.  The patient has no complaints of shortness of breath.  Continue furosemide 20 mg daily.  Laboratory test is reviewed.  The patient had stage IV CKD, follow-up with nephrology clinic.  Status post dual-chamber ICD placement due to ventricular fibrillation: The patient had no ventricular fibrillation based on ICD interrogation.  Continue follow-up with device clinic.  Persistent atrial fibrillation: His ventricular rate is well-controlled.  Continue diltiazem  mg daily, metoprolol succinate 100 mg daily. His [id number] score is 4.  Continue Xarelto 15 mg at suppertime.  The patient is on Plavix due to coronary artery disease and multiple stent placement.  May consider to stop her Plavix in the future.  Will discuss Watchman device instead of chronic anticoagulation at next visit.  Benign essential hypertension: His blood pressure is well-controlled with diltiazem ER and metoprolol succinate, hydralazine.  Dyslipidemia: Continue atorvastatin 80 mg at bedtime.  LDL was well-controlled.  Stage IV CKD: Follow-up with nephrology clinic.    The patient did not bring his medication with him.  Advised him to bring all his medication bottles with him at next visit.    Thank you for the opportunity to be involved in the care of Dillon Pennington. If you have any questions, please feel free to contact me.  I will see the patient again in 6 months and as needed.    Much or all of the text in this note was generated  through the use of Dragon Dictate voice-to-text software. Errors in spelling or words which seem out of context are unintentional. Sound alike errors, in particular, may have escaped editing.       History of Present Illness:   It is my pleasure to see Dillon Pennington at the [facility] clinic for routine cardiology follow up.  Dillon Pennington is a 69 year old male with a medical history of coronary artery disease status post DEJUAN to LAD and LCx in April 2018, ischemic cardiomyopathy with LVEF 25% to 60%, chronic systolic congestive heart failure, status post ICD placement due to ventricular fibrillation, persistent atrial fibrillation, benign essential hypertension, dyslipidemia, stage IV CKD.    The patient does not speak English.  His medical history is interpreted by professional [employer] .  He has followed up with Dr. Russo in the past.  Now he presents to cardiology clinic for routine cardiology follow-up.    He denies chest pain, shortness of breath on exertion, palpitations, dizziness, orthopnea, PND or leg edema.  He states that he has been doing fine over last 6 months.  His blood pressure and heart rate are controlled.    He has ICD interrogation today which showed persistent atrial fibrillation with very short episode of rapid ventricular response, well-controlled atrial fibrillation, normal device function.    Past Medical History:     Patient Active Problem List   Diagnosis    Chronic kidney disease (CKD), stage III (moderate) (H)    Chronic systolic heart failure (H)    Dilated cardiomyopathy (H)    Permanent atrial fibrillation    VF (ventricular fibrillation) (H)    Coronary artery disease involving native coronary artery of native heart without angina pectoris    Ventricular tachycardia (H)    Anemia    Dyslipidemia, goal LDL below 70    ICD (implantable cardioverter-defibrillator) in place, dual chamber    Cough    Food insecurity    Limitation of activities due to disability     Dyspnea    Ischemic cardiomyopathy    End stage congestive heart failure (H)    CKD (chronic kidney disease) stage 4, GFR 15-29 ml/min (H)       Past Surgical History:     Past Surgical History:   Procedure Laterality Date    CHOLECYSTECTOMY      CV CORONARY ANGIOGRAM N/A 4/18/2018    Procedure: Coronary Angiogram;  Surgeon: Heriberto Cheung MD;  Location: [facility];  Service:     CV CORONARY ANGIOGRAM N/A 4/20/2018    Procedure: Coronary Angiogram;  Surgeon: Javon Carrington MD;  Location: [facility];  Service:     CV IMPELLA INSERT N/A 4/20/2018    Procedure: Impella Insert;  Surgeon: Javon Carrington MD;  Location: [facility];  Service:     CV LEFT HEART CATHETERIZATION WITHOUT LEFT VENTRICULOGRAM Left 4/18/2018    Procedure: Left Heart Catheterization Without Left Ventriculogram;  Surgeon: Heriberto Cheung MD;  Location: [facility];  Service:     EP ICD INSERT N/A 4/23/2018    Procedure: EP ICD Insert;  Surgeon: Rakesh Rivera MD;  Location: [facility];  Service:     PICC  4/18/2018            Family History:   No family history on file.     Social History:    reports that he has never smoked. He has never used smokeless tobacco. He reports current alcohol use. He reports that he does not use drugs.    Review of Systems:   12 systems are reviewed negative except for in HPI.    Meds:     Current Outpatient Medications:     acetaminophen (TYLENOL) 325 MG tablet, [ACETAMINOPHEN (TYLENOL) 325 MG TABLET] Take 650 mg by mouth every 6 (six) hours as needed for pain., Disp: , Rfl:     allopurinol (ZYLOPRIM) 300 MG tablet, [ALLOPURINOL (ZYLOPRIM) 300 MG TABLET] Take 300 mg by mouth daily., Disp: , Rfl:     atorvastatin (LIPITOR) 80 MG tablet, [ATORVASTATIN (LIPITOR) 80 MG TABLET] Take 1 tablet (80 mg total) by mouth daily., Disp: 90 tablet, Rfl: 0    cetirizine (ZYRTEC) 10 MG tablet, [CETIRIZINE (ZYRTEC) 10 MG TABLET] Take 1 tablet by mouth daily.,  "Disp: , Rfl: 0    cholecalciferol, vitamin D3, (VITAMIN D3) 5,000 unit Tab, [CHOLECALCIFEROL, VITAMIN D3, (VITAMIN D3) 5,000 UNIT TAB] Take 5,000 Units by mouth Daily at 5 pm., Disp: , Rfl:     clopidogrel (PLAVIX) 75 MG tablet, TAKE 1 PILL (75 MG TOTAL) BY MOUTH DAILY /TXA UB NO 1 LUB [facility] PAB KOM NTSHAV STEFFANIE, Disp: 90 tablet, Rfl: 1    diltiazem ER COATED BEADS (CARDIZEM CD/CARTIA XT) 180 MG 24 hr capsule, TAKE 1 CAPSULE (180 MG TOTAL) BY MOUTH DAILY/ TXA UB NO 1 LUB [facility] PAB ZOO NTSHAV SIAB, Disp: 90 capsule, Rfl: 1    fluticasone (VERAMYST) 27.5 mcg/actuation nasal spray, Spray 1 spray in nostril nightly as needed , Disp: , Rfl:     furosemide (LASIX) 20 MG tablet, [FUROSEMIDE (LASIX) 20 MG TABLET] Take 1 tablet (20 mg total) by mouth daily., Disp: 90 tablet, Rfl: 0    hydrALAZINE (APRESOLINE) 25 MG tablet, [HYDRALAZINE (APRESOLINE) 25 MG TABLET] TAKE 1 PILL (25 MG TOTAL) BY MOUTH 3 TIMES EVERYDAY/ TXA [facility] NO 1 LUB [facility] 3 ZAUG, Disp: 270 tablet, Rfl: 2    levothyroxine (SYNTHROID) 88 MCG tablet, [LEVOTHYROXINE (SYNTHROID) 88 MCG TABLET] Take 88 mcg by mouth Daily at 6:00 am. Indications: hypothyroidism, Disp: , Rfl:     nitroGLYcerin (NITROSTAT) 0.4 MG sublingual tablet, DISSOLVE 1 PILL UNDER TONGUE EVERY 5 MINUTES AS NEEDED FOR CHEST PAIN/YOG MOB HAUV SIAB MUAB IB LUB [facility]  HAUV QAB NPLAIG TXHUA 5 DINA THIS, Disp: 25 tablet, Rfl: 3    XARELTO 15 mg tablet, [XARELTO 15 MG TABLET] TAKE 1 TABLET (15 MG TOTAL) BY MOUTH DAILY WITH SUPPER., Disp: 90 tablet, Rfl: 1    metoprolol succinate (TOPROL-XL) 100 MG 24 hr tablet, [METOPROLOL SUCCINATE (TOPROL-XL) 100 MG 24 HR TABLET] TAKE 2 TABLETS (200 MG TOTAL) BY MOUTH DAILY/ ASA HNUB NOJ 2 LUB TSHUAJ PAB ZOO NTSHAV SIAB, Disp: 180 tablet, Rfl: 1    Allergies:   Patient has no known allergies.      Objective:      Physical Exam  77.6 kg (171 lb)  1.626 m (5' 4\")  Body mass index is 29.35 kg/m .  /74 (BP Location: Right arm, Patient Position: " "Sitting, Cuff Size: Adult Regular)   Pulse 60   Resp 16   Ht 1.626 m (5' 4\")   Wt 77.6 kg (171 lb)   BMI 29.35 kg/m      General Appearance:   Awake, Alert, No acute distress.   HEENT:  Pupil equal and reactive to light. No scleral icterus; the mucous membranes were moist.   Neck: No cervical bruits. No JVD. No thyromegaly.     Chest: The spine was straight. The chest was symmetric.   Lungs:   Respirations unlabored; Lungs are clear to auscultation.  Few crackles in left bases. No wheezing.   Cardiovascular:   Irregular rhythm and rate, normal first and second heart sounds with no murmurs. No rubs or gallops.    Abdomen:  Soft. No tenderness. Non-distended. Bowels sounds are present   Extremities: Equal tibial pulses. No leg edema.   Skin: No rashes or ulcers. Warm, Dry.   Musculoskeletal: No tenderness. No deformity.   Neurologic: Mood and affect are appropriate. No focal deficits.         ICD interrogation today: Personally reviewed  Normal device function  Short episodes of atrial fibrillation with rapid ventricular response    Cardiac Imaging Studies  Echocardiogram in February 2022:  The left ventricle is normal in size.  There is mild concentric left ventricular hypertrophy.  The visual ejection fraction is 60-65%.  Septal wall motion abnormality may reflect pacemaker activation.  TAPSE is abnormal, which is consistent with abnormal right ventricular  systolic function.  Pacemaker catheter in the right heart  The left atrium is mildly dilated.  There is mild (1+) mitral regurgitation.  Right ventricle systolic pressure estimate normal  There is mild (1+) aortic regurgitation.  The ascending aorta is Mildly dilated.  Comapred to 7/21/2020 no significant change is observed.LV function is mildly  more vigorous on the current study    Lab Review   Lab Results   Component Value Date     11/19/2024    CO2 19 11/19/2024    CO2 23 07/30/2021    BUN 60.4 11/19/2024    BUN 35 07/30/2021     Lab Results " "  Component Value Date    WBC 7.7 11/10/2022    HGB 16.6 11/10/2022    HCT 49.1 11/10/2022    MCV 94 11/10/2022     11/10/2022     Lab Results   Component Value Date    CHOL 146 05/16/2024    CHOL 181 11/16/2023    CHOL 152 04/21/2023     Lab Results   Component Value Date    HDL 43 05/16/2024    HDL 44 11/16/2023    HDL 35 (L) 04/21/2023     No components found for: \"LDLCALC\"  Lab Results   Component Value Date    TRIG 178 (H) 05/16/2024    TRIG 355 (H) 11/16/2023    TRIG 263 (H) 04/21/2023     No results found for: \"CHOLHDL\"  Lab Results   Component Value Date    TROPONINI <0.01 05/16/2018     Lab Results   Component Value Date     02/19/2020     Lab Results   Component Value Date    TSH 2.35 05/16/2024    TSH 1.69 07/20/2021             "

## 2025-01-15 LAB
MDC_IDC_EPISODE_DTM: NORMAL
MDC_IDC_EPISODE_ID: NORMAL
MDC_IDC_EPISODE_TYPE: NORMAL
MDC_IDC_LEAD_CONNECTION_STATUS: NORMAL
MDC_IDC_LEAD_CONNECTION_STATUS: NORMAL
MDC_IDC_LEAD_IMPLANT_DT: NORMAL
MDC_IDC_LEAD_IMPLANT_DT: NORMAL
MDC_IDC_LEAD_LOCATION: NORMAL
MDC_IDC_LEAD_LOCATION: NORMAL
MDC_IDC_LEAD_LOCATION_DETAIL_1: NORMAL
MDC_IDC_LEAD_LOCATION_DETAIL_1: NORMAL
MDC_IDC_LEAD_MFG: NORMAL
MDC_IDC_LEAD_MFG: NORMAL
MDC_IDC_LEAD_MODEL: NORMAL
MDC_IDC_LEAD_MODEL: NORMAL
MDC_IDC_LEAD_POLARITY_TYPE: NORMAL
MDC_IDC_LEAD_POLARITY_TYPE: NORMAL
MDC_IDC_LEAD_SERIAL: NORMAL
MDC_IDC_LEAD_SERIAL: NORMAL
MDC_IDC_MSMT_BATTERY_DTM: NORMAL
MDC_IDC_MSMT_BATTERY_REMAINING_LONGEVITY: 48 MO
MDC_IDC_MSMT_BATTERY_REMAINING_PERCENTAGE: 54 %
MDC_IDC_MSMT_BATTERY_STATUS: NORMAL
MDC_IDC_MSMT_CAP_CHARGE_DTM: NORMAL
MDC_IDC_MSMT_CAP_CHARGE_TIME: 11.7 S
MDC_IDC_MSMT_CAP_CHARGE_TYPE: NORMAL
MDC_IDC_MSMT_LEADCHNL_RA_IMPEDANCE_VALUE: 691 OHM
MDC_IDC_MSMT_LEADCHNL_RA_IMPEDANCE_VALUE: 691 OHM
MDC_IDC_MSMT_LEADCHNL_RA_LEAD_CHANNEL_STATUS: NORMAL
MDC_IDC_MSMT_LEADCHNL_RV_IMPEDANCE_VALUE: 744 OHM
MDC_IDC_MSMT_LEADCHNL_RV_PACING_THRESHOLD_AMPLITUDE: 0.6 V
MDC_IDC_MSMT_LEADCHNL_RV_PACING_THRESHOLD_PULSEWIDTH: 0.4 MS
MDC_IDC_PG_IMPLANT_DTM: NORMAL
MDC_IDC_PG_MFG: NORMAL
MDC_IDC_PG_MODEL: NORMAL
MDC_IDC_PG_SERIAL: NORMAL
MDC_IDC_PG_TYPE: NORMAL
MDC_IDC_SESS_CLINIC_NAME: NORMAL
MDC_IDC_SESS_DTM: NORMAL
MDC_IDC_SESS_TYPE: NORMAL
MDC_IDC_SET_BRADY_LOWRATE: 60 {BEATS}/MIN
MDC_IDC_SET_BRADY_MAX_SENSOR_RATE: 120 {BEATS}/MIN
MDC_IDC_SET_BRADY_MODE: NORMAL
MDC_IDC_SET_LEADCHNL_RA_PACING_POLARITY: NORMAL
MDC_IDC_SET_LEADCHNL_RA_SENSING_ADAPTATION_MODE: NORMAL
MDC_IDC_SET_LEADCHNL_RA_SENSING_POLARITY: NORMAL
MDC_IDC_SET_LEADCHNL_RA_SENSING_SENSITIVITY: 0.15 MV
MDC_IDC_SET_LEADCHNL_RV_PACING_AMPLITUDE: 1.5 V
MDC_IDC_SET_LEADCHNL_RV_PACING_POLARITY: NORMAL
MDC_IDC_SET_LEADCHNL_RV_PACING_PULSEWIDTH: 0.4 MS
MDC_IDC_SET_LEADCHNL_RV_SENSING_ADAPTATION_MODE: NORMAL
MDC_IDC_SET_LEADCHNL_RV_SENSING_POLARITY: NORMAL
MDC_IDC_SET_LEADCHNL_RV_SENSING_SENSITIVITY: 0.6 MV
MDC_IDC_SET_ZONE_DETECTION_INTERVAL: 250 MS
MDC_IDC_SET_ZONE_DETECTION_INTERVAL: 324 MS
MDC_IDC_SET_ZONE_STATUS: NORMAL
MDC_IDC_SET_ZONE_STATUS: NORMAL
MDC_IDC_SET_ZONE_TYPE: NORMAL
MDC_IDC_SET_ZONE_TYPE: NORMAL
MDC_IDC_SET_ZONE_VENDOR_TYPE: NORMAL
MDC_IDC_SET_ZONE_VENDOR_TYPE: NORMAL
MDC_IDC_STAT_AT_BURDEN_PERCENT: 100 %
MDC_IDC_STAT_BRADY_DTM_END: NORMAL
MDC_IDC_STAT_BRADY_DTM_START: NORMAL
MDC_IDC_STAT_BRADY_RA_PERCENT_PACED: 0 %
MDC_IDC_STAT_BRADY_RV_PERCENT_PACED: 35 %
MDC_IDC_STAT_EPISODE_RECENT_COUNT: 0
MDC_IDC_STAT_EPISODE_RECENT_COUNT: 50
MDC_IDC_STAT_EPISODE_RECENT_COUNT_DTM_END: NORMAL
MDC_IDC_STAT_EPISODE_RECENT_COUNT_DTM_START: NORMAL
MDC_IDC_STAT_EPISODE_TYPE: NORMAL
MDC_IDC_STAT_EPISODE_VENDOR_TYPE: NORMAL
MDC_IDC_STAT_TACHYTHERAPY_ATP_DELIVERED_RECENT: 0
MDC_IDC_STAT_TACHYTHERAPY_ATP_DELIVERED_TOTAL: 3
MDC_IDC_STAT_TACHYTHERAPY_RECENT_DTM_END: NORMAL
MDC_IDC_STAT_TACHYTHERAPY_RECENT_DTM_START: NORMAL
MDC_IDC_STAT_TACHYTHERAPY_SHOCKS_ABORTED_RECENT: 0
MDC_IDC_STAT_TACHYTHERAPY_SHOCKS_ABORTED_TOTAL: 0
MDC_IDC_STAT_TACHYTHERAPY_SHOCKS_DELIVERED_RECENT: 0
MDC_IDC_STAT_TACHYTHERAPY_SHOCKS_DELIVERED_TOTAL: 0
MDC_IDC_STAT_TACHYTHERAPY_TOTAL_DTM_END: NORMAL
MDC_IDC_STAT_TACHYTHERAPY_TOTAL_DTM_START: NORMAL

## 2025-01-15 PROCEDURE — 93282 PRGRMG EVAL IMPLANTABLE DFB: CPT | Performed by: INTERNAL MEDICINE

## 2025-02-06 ENCOUNTER — APPOINTMENT (OUTPATIENT)
Dept: INTERPRETER SERVICES | Facility: CLINIC | Age: 70
End: 2025-02-06
Payer: COMMERCIAL

## 2025-02-06 ENCOUNTER — TELEPHONE (OUTPATIENT)
Dept: PHARMACY | Facility: OTHER | Age: 70
End: 2025-02-06
Payer: COMMERCIAL

## 2025-02-06 NOTE — TELEPHONE ENCOUNTER
[facility] Recruitment: [facility]  insurance     Referral outreach attempt #1 on February 6, 2025      Outcome: left voicemail- Call back number [phone]    Carmela Haley - [facility]    [phone]

## 2025-02-14 ENCOUNTER — LAB REQUISITION (OUTPATIENT)
Dept: LAB | Facility: CLINIC | Age: 70
End: 2025-02-14
Payer: COMMERCIAL

## 2025-02-14 DIAGNOSIS — Z12.5 ENCOUNTER FOR SCREENING FOR MALIGNANT NEOPLASM OF PROSTATE: ICD-10-CM

## 2025-02-14 DIAGNOSIS — E11.22 TYPE 2 DIABETES MELLITUS WITH DIABETIC CHRONIC KIDNEY DISEASE (H): ICD-10-CM

## 2025-02-14 DIAGNOSIS — M10.9 GOUT, UNSPECIFIED: ICD-10-CM

## 2025-02-14 DIAGNOSIS — I13.0 HYPERTENSIVE HEART AND CHRONIC KIDNEY DISEASE WITH HEART FAILURE AND STAGE 1 THROUGH STAGE 4 CHRONIC KIDNEY DISEASE, OR UNSPECIFIED CHRONIC KIDNEY DISEASE (H): ICD-10-CM

## 2025-02-14 PROCEDURE — 84550 ASSAY OF BLOOD/URIC ACID: CPT | Performed by: NURSE PRACTITIONER

## 2025-02-14 PROCEDURE — G0103 PSA SCREENING: HCPCS | Performed by: NURSE PRACTITIONER

## 2025-02-14 PROCEDURE — 80053 COMPREHEN METABOLIC PANEL: CPT | Performed by: NURSE PRACTITIONER

## 2025-02-14 PROCEDURE — 82043 UR ALBUMIN QUANTITATIVE: CPT | Performed by: NURSE PRACTITIONER

## 2025-02-14 PROCEDURE — 87086 URINE CULTURE/COLONY COUNT: CPT | Performed by: NURSE PRACTITIONER

## 2025-02-14 PROCEDURE — 87086 URINE CULTURE/COLONY COUNT: CPT | Mod: ORL | Performed by: NURSE PRACTITIONER

## 2025-02-14 PROCEDURE — 82570 ASSAY OF URINE CREATININE: CPT | Performed by: NURSE PRACTITIONER

## 2025-02-14 PROCEDURE — 80048 BASIC METABOLIC PNL TOTAL CA: CPT | Performed by: NURSE PRACTITIONER

## 2025-02-15 LAB
ALBUMIN SERPL BCG-MCNC: 3.5 G/DL (ref 3.5–5.2)
ALP SERPL-CCNC: 128 U/L (ref 40–150)
ALT SERPL W P-5'-P-CCNC: 60 U/L (ref 0–70)
ANION GAP SERPL CALCULATED.3IONS-SCNC: 13 MMOL/L (ref 7–15)
AST SERPL W P-5'-P-CCNC: 64 U/L (ref 0–45)
BILIRUB SERPL-MCNC: 0.5 MG/DL
BUN SERPL-MCNC: 61 MG/DL (ref 8–23)
CALCIUM SERPL-MCNC: 9.2 MG/DL (ref 8.8–10.4)
CHLORIDE SERPL-SCNC: 105 MMOL/L (ref 98–107)
CREAT SERPL-MCNC: 2.76 MG/DL (ref 0.67–1.17)
CREAT UR-MCNC: 102 MG/DL
EGFRCR SERPLBLD CKD-EPI 2021: 24 ML/MIN/1.73M2
GLUCOSE SERPL-MCNC: 109 MG/DL (ref 70–99)
HCO3 SERPL-SCNC: 22 MMOL/L (ref 22–29)
MICROALBUMIN UR-MCNC: 1113 MG/L
MICROALBUMIN/CREAT UR: 1091.18 MG/G CR (ref 0–17)
POTASSIUM SERPL-SCNC: 3.5 MMOL/L (ref 3.4–5.3)
PROT SERPL-MCNC: 7.1 G/DL (ref 6.4–8.3)
PSA SERPL DL<=0.01 NG/ML-MCNC: 1.59 NG/ML (ref 0–6.5)
SODIUM SERPL-SCNC: 140 MMOL/L (ref 135–145)
URATE SERPL-MCNC: 5 MG/DL (ref 3.4–7)

## 2025-02-16 LAB — BACTERIA UR CULT: NO GROWTH

## 2025-05-05 ENCOUNTER — LAB REQUISITION (OUTPATIENT)
Dept: LAB | Facility: CLINIC | Age: 70
End: 2025-05-05
Payer: COMMERCIAL

## 2025-05-06 LAB — BACTERIA UR CULT: NO GROWTH

## 2025-05-14 ENCOUNTER — TELEPHONE (OUTPATIENT)
Dept: PHARMACY | Facility: OTHER | Age: 70
End: 2025-05-14
Payer: COMMERCIAL

## 2025-05-14 ENCOUNTER — APPOINTMENT (OUTPATIENT)
Dept: INTERPRETER SERVICES | Facility: CLINIC | Age: 70
End: 2025-05-14
Payer: COMMERCIAL

## 2025-05-14 NOTE — TELEPHONE ENCOUNTER
[facility] Recruitment: [facility]  insurance     Referral outreach attempt #2 on May 14, 2025.       Outcome: visit scheduled  Call completed with [facility] .     Radha Herr PharmD   Medication Therapy Management Pharmacy Resident

## 2025-05-20 ENCOUNTER — LAB REQUISITION (OUTPATIENT)
Dept: LAB | Facility: CLINIC | Age: 70
End: 2025-05-20
Payer: COMMERCIAL

## 2025-05-20 DIAGNOSIS — R31.0 GROSS HEMATURIA: ICD-10-CM

## 2025-05-20 DIAGNOSIS — E78.2 MIXED HYPERLIPIDEMIA: ICD-10-CM

## 2025-05-20 DIAGNOSIS — E03.9 HYPOTHYROIDISM, UNSPECIFIED: ICD-10-CM

## 2025-05-20 LAB
ALBUMIN UR-MCNC: 30 MG/DL
APPEARANCE UR: CLEAR
BILIRUB UR QL STRIP: NEGATIVE
COLOR UR AUTO: ABNORMAL
GLUCOSE UR STRIP-MCNC: NEGATIVE MG/DL
HGB UR QL STRIP: ABNORMAL
KETONES UR STRIP-MCNC: NEGATIVE MG/DL
LEUKOCYTE ESTERASE UR QL STRIP: NEGATIVE
MUCOUS THREADS #/AREA URNS LPF: PRESENT /LPF
NITRATE UR QL: NEGATIVE
PH UR STRIP: 5.5 [PH] (ref 5–7)
RBC URINE: 17 /HPF
SP GR UR STRIP: 1.01 (ref 1–1.03)
UROBILINOGEN UR STRIP-MCNC: NORMAL MG/DL
WBC URINE: <1 /HPF

## 2025-05-21 LAB
ALBUMIN SERPL BCG-MCNC: 3.4 G/DL (ref 3.5–5.2)
ALP SERPL-CCNC: 109 U/L (ref 40–150)
ALT SERPL W P-5'-P-CCNC: 22 U/L (ref 0–70)
ANION GAP SERPL CALCULATED.3IONS-SCNC: 11 MMOL/L (ref 7–15)
AST SERPL W P-5'-P-CCNC: 29 U/L (ref 0–45)
BILIRUB SERPL-MCNC: 0.6 MG/DL
BUN SERPL-MCNC: 50 MG/DL (ref 8–23)
CALCIUM SERPL-MCNC: 8.9 MG/DL (ref 8.8–10.4)
CHLORIDE SERPL-SCNC: 108 MMOL/L (ref 98–107)
CHOLEST SERPL-MCNC: 127 MG/DL
CREAT SERPL-MCNC: 3.36 MG/DL (ref 0.67–1.17)
EGFRCR SERPLBLD CKD-EPI 2021: 19 ML/MIN/1.73M2
FASTING STATUS PATIENT QL REPORTED: ABNORMAL
FASTING STATUS PATIENT QL REPORTED: ABNORMAL
GLUCOSE SERPL-MCNC: 96 MG/DL (ref 70–99)
HCO3 SERPL-SCNC: 22 MMOL/L (ref 22–29)
HDLC SERPL-MCNC: 39 MG/DL
LDLC SERPL CALC-MCNC: 49 MG/DL
NONHDLC SERPL-MCNC: 88 MG/DL
POTASSIUM SERPL-SCNC: 3.8 MMOL/L (ref 3.4–5.3)
PROT SERPL-MCNC: 6.7 G/DL (ref 6.4–8.3)
SODIUM SERPL-SCNC: 141 MMOL/L (ref 135–145)
T4 FREE SERPL-MCNC: 1.67 NG/DL (ref 0.9–1.7)
TRIGL SERPL-MCNC: 196 MG/DL
TSH SERPL DL<=0.005 MIU/L-ACNC: 2.14 UIU/ML (ref 0.3–4.2)

## 2025-05-30 ENCOUNTER — VIRTUAL VISIT (OUTPATIENT)
Dept: PHARMACY | Facility: CLINIC | Age: 70
End: 2025-05-30
Payer: COMMERCIAL

## 2025-05-30 DIAGNOSIS — I48.19 PERSISTENT ATRIAL FIBRILLATION (H): Primary | ICD-10-CM

## 2025-05-30 DIAGNOSIS — I42.0 DILATED CARDIOMYOPATHY (H): ICD-10-CM

## 2025-05-30 DIAGNOSIS — J30.81 ALLERGIC RHINITIS DUE TO ANIMALS: ICD-10-CM

## 2025-05-30 DIAGNOSIS — M10.9 GOUT: ICD-10-CM

## 2025-05-30 DIAGNOSIS — I25.10 CORONARY ARTERY DISEASE INVOLVING NATIVE CORONARY ARTERY OF NATIVE HEART WITHOUT ANGINA PECTORIS: Chronic | ICD-10-CM

## 2025-05-30 DIAGNOSIS — E87.6 HYPOKALEMIA: ICD-10-CM

## 2025-05-30 DIAGNOSIS — E03.9 HYPOTHYROIDISM: ICD-10-CM

## 2025-05-30 DIAGNOSIS — E78.2 MIXED HYPERLIPIDEMIA: ICD-10-CM

## 2025-05-30 DIAGNOSIS — N18.4 CKD (CHRONIC KIDNEY DISEASE) STAGE 4, GFR 15-29 ML/MIN (H): ICD-10-CM

## 2025-05-30 DIAGNOSIS — M10.9 GOUT, UNSPECIFIED CAUSE, UNSPECIFIED CHRONICITY, UNSPECIFIED SITE: ICD-10-CM

## 2025-05-30 DIAGNOSIS — E03.9 HYPOTHYROIDISM, UNSPECIFIED TYPE: ICD-10-CM

## 2025-05-30 DIAGNOSIS — I50.84 END STAGE CONGESTIVE HEART FAILURE (H): ICD-10-CM

## 2025-05-30 RX ORDER — LIDOCAINE 40 MG/G
CREAM TOPICAL 2 TIMES DAILY
COMMUNITY
End: 2025-05-30

## 2025-05-30 RX ORDER — POTASSIUM CHLORIDE 1500 MG/1
20 TABLET, EXTENDED RELEASE ORAL DAILY
COMMUNITY

## 2025-05-30 RX ORDER — HYDROCORTISONE AND ACETIC ACID 20.75; 10.375 MG/ML; MG/ML
3 SOLUTION AURICULAR (OTIC) 3 TIMES DAILY
COMMUNITY
End: 2025-05-30

## 2025-05-30 RX ORDER — LISINOPRIL 5 MG/1
5 TABLET ORAL DAILY
COMMUNITY

## 2025-05-30 RX ORDER — TRIAMCINOLONE ACETONIDE 1 MG/G
CREAM TOPICAL 2 TIMES DAILY
COMMUNITY

## 2025-05-30 RX ORDER — POLYVINYL ALCOHOL, POVIDONE 14; 6 MG/ML; MG/ML
1 SOLUTION/ DROPS OPHTHALMIC DAILY
COMMUNITY
End: 2025-05-30

## 2025-05-30 RX ORDER — CETIRIZINE HYDROCHLORIDE 5 MG/1
5 TABLET ORAL DAILY
COMMUNITY

## 2025-05-30 RX ORDER — METOPROLOL SUCCINATE 200 MG/1
200 TABLET, EXTENDED RELEASE ORAL DAILY
COMMUNITY

## 2025-05-30 RX ORDER — FEXOFENADINE HCL 180 MG/1
180 TABLET ORAL DAILY PRN
COMMUNITY
End: 2025-05-30

## 2025-05-30 RX ORDER — BENZONATATE 100 MG/1
100 CAPSULE ORAL 3 TIMES DAILY
COMMUNITY
End: 2025-05-30

## 2025-05-30 NOTE — LETTER
May 30, 2025  Dillon Pennington  [address] [place] [address]    Dear Mr. Pennington, [facility]     Thank you for talking with me on May 30, 2025 about your health and medications. As a follow-up to our conversation, I have included two documents:      Your Recommended To-Do List has steps you should take to get the best results from your medications.  Your Medication List will help you keep track of your medications and how to take them.    If you want to talk about these documents, please call Radha Herr RPH at phone: [phone], Monday-Friday 8-4:30pm.    I look forward to working with you and your doctors to make sure your medications work well for you.    Sincerely,  Radha Herr RPH  [facility] Pharmacist, [facility]

## 2025-05-30 NOTE — LETTER
"Recommended To-Do List      Prepared on: May 30, 2025       You can get the best results from your medications by completing the items on this \"To-Do List.\"      Bring your To-Do List when you go to your doctor. And, share it with your family or caregivers.    My To-Do List:  What we talked about: What I should do:   A new medication that may be of benefit to you    Start taking cetirizine (zyrTEC) daily as needed for allergies          What we talked about: What I should do:                     "

## 2025-05-30 NOTE — PATIENT INSTRUCTIONS
"Recommendations from today's MTM visit:                                                       START cetirizine 5mg 1 tablet daily as needed for allergies   Replace the batteries in your scale and check your weight every morning   Let your doctor know if you gain more than 2 pounds in a day or 5 pounds in a week  Watch for signs of bleeding and seek medical care if you were to fall or hit your hea  Continue monitoring blood pressure daily and report to your doctor if you start seeing top numbers under 100   Make sure you are drinking plenty of water as instructed by your doctor as your renal function will be checked at your next appointment     Follow-up: Return in 4 weeks (on 6/27/2025) for with PharmD.      It was great speaking with you today.  I value your experience and would be very thankful for your time in providing feedback in our clinic survey. In the next few days, you may receive an email or text message from [employer] with a link to a survey related to your  clinical pharmacist.\"      To schedule another MTM appointment, please call the MTM scheduling line at [phone].      My Clinical Pharmacist's contact information:                                                       Please feel free to contact me with any questions or concerns you have.      Radha Herr, Fern   Medication Therapy Management Pharmacy Resident      "

## 2025-05-30 NOTE — LETTER
_  Medication List        Prepared on: May 30, 2025     Bring your Medication List when you go to the doctor, hospital, or   emergency room. And, share it with your family or caregivers.     Note any changes to how you take your medications.  Cross out medications when you no longer use them.    Medication How I take it Why I use it Prescriber   acetaminophen 500 MG CAPS Take 1-2 capsules by mouth 2 times daily as needed for mild pain.  Pain Jessica Ventura NP     allopurinol (ZYLOPRIM) 300 MG tablet [ALLOPURINOL (ZYLOPRIM) 300 MG TABLET] Take 300 mg by mouth daily. Gout Gomez Farah MD   atorvastatin (LIPITOR) 80 MG tablet [ATORVASTATIN (LIPITOR) 80 MG TABLET] Take 1 tablet (80 mg total) by mouth daily. CAD (Coronary Artery Disease); S/P Coronary Artery Stent Placement Parth Russo MD   cholecalciferol (VITAMIN D3) 25 mcg (1000 units) capsule Take 1 capsule by mouth daily.  General Health Yves Beltran MD   clopidogrel (PLAVIX) 75 MG tablet TAKE 1 PILL (75 MG TOTAL) BY MOUTH DAILY /TXHUA HNUB NOJ 1 LUB TSHUAJ PAB KOM NTSHAV STEFFANIE CAD (Coronary Artery Disease); S/P Coronary Artery Stent Placement Rakesh Rivera MD   diltiazem ER COATED BEADS (CARDIZEM CD/CARTIA XT) 180 MG 24 hr capsule TAKE 1 CAPSULE (180 MG TOTAL) BY MOUTH DAILY/ TXHUA HNUB NOJ 1 LUB TSHUAJ PAB ZOO NTSHAV SIAB Persistent Atrial Fibrillation (H); Dilated Cardiomyopathy (H) Rakesh Rivera MD   FLUTICASONE FUROATE NA Spray 1 spray in nostril daily.  Allergies Jessica Ventura NP     furosemide (LASIX) 20 MG tablet [FUROSEMIDE (LASIX) 20 MG TABLET] Take 1 tablet (20 mg total) by mouth daily. DMITRI (acute kidney injury) Parth Russo MD   hydrALAZINE (APRESOLINE) 25 MG tablet [HYDRALAZINE (APRESOLINE) 25 MG TABLET] TAKE 1 PILL (25 MG TOTAL) BY MOUTH 3 TIMES EVERYDAY/ TXHUA HNUB NOJ 1 LUB TSHUAJ 3 ZAUG Ischemic Cardiomyopathy; Essential Hypertension Parth Russo MD   levothyroxine (SYNTHROID) 88 MCG tablet [LEVOTHYROXINE (SYNTHROID) 88 MCG  TABLET] Take 88 mcg by mouth Daily at 6:00 am. Indications: hypothyroidism  Hypothyroidism Jessica Ventura NP     lisinopril (ZESTRIL) 5 MG tablet Take 5 mg by mouth daily.  Hypertension Chanel Hobbs APRN CNP      metoprolol succinate ER (TOPROL XL) 200 MG 24 hr tablet Take 200 mg by mouth daily.  Hypertension Jessica Ventura NP     potassium chloride kendell ER (KLOR-CON M20) 20 MEQ CR tablet Take 20 mEq by mouth daily.  Low potassium Jessica Ventura NP     triamcinolone (KENALOG) 0.1 % external cream Apply topically 2 times daily. Skin  Jessica Ventura NP     XARELTO 15 mg tablet [XARELTO 15 MG TABLET] TAKE 1 TABLET (15 MG TOTAL) BY MOUTH DAILY WITH SUPPER. Ventricular Tachycardia (H); ICD (implantable cardioverter-defibrillator) in place; Persistent Atrial Fibrillation (H) Rakesh Rivera MD         Add new medications, over-the-counter drugs, herbals, vitamins, or  minerals in the blank rows below.    Medication How I take it Why I use it Prescriber                                      Allergies:      No Known Allergies        Side effects I have had:      No Known Side Effects        Other Information:              My notes and questions:

## 2025-05-30 NOTE — Clinical Note
Irvin Pendleton,   I had an appointment with Dillon morrison and he reported that he does not have nitrostat at home. I see that the prescription was last written in 2021 and so I just wanted to check with you that he should have nitrostat before I sent in a new prescription. Please let me know either way!   Thanks,  Radha Herr, PharmD  Medication Therapy Management Pharmacy Resident

## 2025-05-30 NOTE — PROGRESS NOTES
Medication Therapy Management (MTM) Encounter    ASSESSMENT:                            Medication Adherence/Access: No issues identified.     Hypertension/AFib  Blood pressure at at goal <130/80 mmHg at last check and reported home blood pressure also at goal. Managed by cardiology. Patient at high bleed risk due to use of xarelto and clopidogrel. Educated patient to monitor for signs of bleeding and to seek medical care if he were to hit his head. Also recommended patient continue to monitor blood pressure and if he notices his blood pressure starts to trend down to notify his doctor. Could consider titrating lisinopril while closely monitoring renal function and tapering down hydralazine as hydralazine has limited known benefit in HFpEF, especially without the use of a nitrate.    Hyperlipidemia/CAD/HFpEF  Managed by cardiology. Patient appropriately on high intensity statin for CAD and LDL at goal <55 mg/dL. Patient not on GDMT for heart failure. Jardiance and spironolactone are not recommended due to patient's renal function. Will discuss nitrostat with cardiology as it is recommended for patient's with CAD to have on hand for angina, but it has not been prescribed by cardiology since 2021.   6/2 Update: Per cardiology, patient should have nitrostat on hand.        Allergies/Acute Cough  Patient would benefit from having a medication to help with allergies. Will try cetrizine as patient does not remember if cetrizine or fexofenadine was more effective for him. Will assess if it is helpful at next appointment.     Hypothyroidism  TSH within target range. No medication changes recommended today.     Supplements   Recommend vitamin D level be checked with next lab draw as it has been over a year since the level has been last checked. Also recommend close monitoring of potassium level.      Topicals  Stable.     Gout  Stable. No medication changes recommended today.       Of note: serum creatinine increased at last  check on 5/20. Per PCP, patient to increase water intake by 1-2 bottles per day and is scheduled to have his renal function rechecked at appointment with PCP in June.     PLAN:                            START cetirizine 5mg 1 tablet daily as needed for allergies   Replace the batteries in your scale and check your weight every morning   Let your doctor know if you gain more than 2 pounds in a day or 5 pounds in a week  Watch for signs of bleeding and seek medical care if you were to fall or hit your head  Continue monitoring blood pressure daily and report to your doctor if you start seeing top numbers under 100   I will speak to your cardiology about if you need a new prescription for Nitrostat   Make sure you are drinking plenty of water as instructed by your doctor as your renal function will be checked at your next appointment   New prescription for nitrostat sent to pharmacy,  and use as needed for chest pain       Follow-up: Return in 4 weeks (on 6/27/2025) for with Fern.    All changes were made via collaborative practice agreement with Jessica Ventura NP.     Radha Herr PharmD   Medication Therapy Management Pharmacy Resident        SUBJECTIVE/OBJECTIVE:                          Dillon Pennington is a 70 year old male called for a yearly medication review. He was referred to [facility] by their insurance plan. Patient was accompanied by son.  (ID# [id number]) was used during today's visit.      Reason for visit: MT follow-up.    Allergies/ADRs: Reviewed in chart  Past Medical History: Reviewed in chart  Tobacco: He reports that he has never smoked. He has never used smokeless tobacco.  Alcohol: Less than 1 beverage / month    Medication Adherence/Access: Patient received pill packs from [facility]. Patient reported he rarely misses a dose of medication.    Hypertension /Afib  Diltiazem 180mg daily   Lisinopril 5mg daily   Hydralazine 25mg 3 times daily   Xarelto 15mg daily   Metoprolol Succ  ER 200mg daily   Patient reports no current medication side effects  Patient self monitors blood pressure.  Home BP monitoring 112/64 mmHg- home blood pressure reported was taken during visit.   Patient reports no signs of bleeding.   Per cardiology, Watchman device may be considered in place of Xarelto - cardio planning to discuss at next appointment in August.        Hyperlipidemia /CAD/HFpEF  Atorvastatin 80mg daily   Clopidogrel 75mg daily   Furosemide 20mg daily   Nitrostat 0.4mg as needed - patient reports he does not have - prescription from 2021  Patient reports no significant myalgias. Did report he will have to urinate after taking furosemide.   Patient reports no chest pain or shortness of breath.   LVEF: 60%   Stent placed in 2018.   Patient is following a low sodium diet. Patient is avoiding alcohol - reports he only drinks about 1 alcoholic drink per month and only if a special event is happening.   Patient does monitor weight at home, however the battery on his scale is dead so he has not checked his weight in over 1 week. Reported he has been about 164 lbs recently.        Allergy /Cough  Fexofenadine 180mg daily as needed -no longer uses, last filled 11/2024, removed from medication list   Fluticasone nasal spray 1 spray each nostril daily   Sodium chloride 0.65% nasal spray 1 spray each nostril every 1 hour as needed - no longer uses, removed from medication list   Benzonatate 100mg 3 times daily  - old prescription, removed from medication list   Patient reports no current medication side effects.    Primary symptoms are runny nose - reports it has been worse in the past month.   Primary triggers are animal dander and pollen.   MedHx: Cetirizine - patient does not remember if this was helpful for him.   Patient does not remember if cetirizine or fexofenadine was helpful for his allergies as he does not read english and so he does not know what he was taking previously.        Hypothyroidism    Levothyroxine 88mcg daily   Patient is having the following symptoms: none.        Supplements   Cholecalciferol 25 mcg daily   Potassium Chloride 20 mEq daily   No reported issues at this time.        Topicals  Acetic acid-hydrocortisone 1-2% otic soln 3 drops 3 times daily  - no longer uses, removed from medication list   Refresh eye drops 1 drop daily -  no longer uses, removed from medication list   Triamcinolone 0.1% cream 2 times daily    Patient reports no medication side effects.   Reports he rarely uses triamcinolone, but still has at home in case his outer ear becomes itchy.     Gout/Pain  Allopurinol 300mg daily   Tylenol 325mg 2 tabs 2 times daily as needed - uses 1-2 tablets as needed  Has not had a gout flare in almost 2 years.    Requesting a refill of tylenol to use as needed     Today's Vitals: There were no vitals taken for this visit.  ----------------    I spent 57 minutes with this patient today. All changes were made via collaborative practice agreement with Jessica Ventura NP.     A summary of these recommendations was declined by the patient.    Radha Herr, PharmD   Medication Therapy Management Pharmacy Resident    Patient was seen independently by Dr. Herr. I agree with her assessment and plan.   Mi Parr, Pharm.D, [facility]  Medication Therapy Management Pharmacist  [phone]      Telemedicine Visit Details  The patient's medications can be safely assessed via a telemedicine encounter.  Type of service:  Telephone visit  Originating Location (pt. Location): Home    Distant Location (provider location):  Off-site  Start Time: 9:05  End Time: 10:02     Medication Therapy Recommendations  Allergic rhinitis due to animals   1 Current Medication: FLUTICASONE FUROATE NA   Current Medication Sig: Spray 1 spray in nostril daily.   Rationale: Synergistic therapy - Needs additional medication therapy - Indication   Recommendation: Start Medication - cetirizine 10 MG tablet   Status:  Accepted per CPA   Identified Date: 5/30/2025 Completed Date: 5/30/2025         Coronary artery disease involving native coronary artery of native heart without angina pectoris   1 Rationale: Synergistic therapy - Needs additional medication therapy - Indication   Recommendation: Start Medication - nitroGLYcerin 0.4 MG sublingual tablet   Status: Contact Provider - Awaiting Response   Identified Date: 5/30/2025

## 2025-06-01 RX ORDER — BUDESONIDE AND FORMOTEROL FUMARATE DIHYDRATE 160; 4.5 UG/1; UG/1
AEROSOL RESPIRATORY (INHALATION)
Qty: 20.4 G | Refills: 11 | Status: CANCELLED | OUTPATIENT
Start: 2025-06-01

## 2025-08-06 ENCOUNTER — ANCILLARY PROCEDURE (OUTPATIENT)
Dept: CARDIOLOGY | Facility: CLINIC | Age: 70
End: 2025-08-06
Attending: INTERNAL MEDICINE
Payer: COMMERCIAL

## 2025-08-06 DIAGNOSIS — I42.0 DILATED CARDIOMYOPATHY (H): ICD-10-CM

## 2025-08-06 DIAGNOSIS — I49.01 VENTRICULAR FIBRILLATION (H): ICD-10-CM

## 2025-08-06 DIAGNOSIS — Z95.810 ICD (IMPLANTABLE CARDIOVERTER-DEFIBRILLATOR) IN PLACE: ICD-10-CM

## 2025-08-06 DIAGNOSIS — I48.19 PERSISTENT ATRIAL FIBRILLATION (H): ICD-10-CM

## 2025-08-06 LAB
MDC_IDC_EPISODE_DTM: NORMAL
MDC_IDC_EPISODE_DURATION: 12 S
MDC_IDC_EPISODE_DURATION: 16 S
MDC_IDC_EPISODE_ID: NORMAL
MDC_IDC_EPISODE_TYPE: NORMAL
MDC_IDC_EPISODE_TYPE_INDUCED: NO
MDC_IDC_EPISODE_TYPE_INDUCED: NO
MDC_IDC_EPISODE_VENDOR_TYPE: NORMAL
MDC_IDC_LEAD_CONNECTION_STATUS: NORMAL
MDC_IDC_LEAD_CONNECTION_STATUS: NORMAL
MDC_IDC_LEAD_IMPLANT_DT: NORMAL
MDC_IDC_LEAD_IMPLANT_DT: NORMAL
MDC_IDC_LEAD_LOCATION: NORMAL
MDC_IDC_LEAD_LOCATION: NORMAL
MDC_IDC_LEAD_LOCATION_DETAIL_1: NORMAL
MDC_IDC_LEAD_LOCATION_DETAIL_1: NORMAL
MDC_IDC_LEAD_MFG: NORMAL
MDC_IDC_LEAD_MFG: NORMAL
MDC_IDC_LEAD_MODEL: NORMAL
MDC_IDC_LEAD_MODEL: NORMAL
MDC_IDC_LEAD_POLARITY_TYPE: NORMAL
MDC_IDC_LEAD_POLARITY_TYPE: NORMAL
MDC_IDC_LEAD_SERIAL: NORMAL
MDC_IDC_LEAD_SERIAL: NORMAL
MDC_IDC_MSMT_BATTERY_DTM: NORMAL
MDC_IDC_MSMT_BATTERY_REMAINING_LONGEVITY: 42 MO
MDC_IDC_MSMT_BATTERY_REMAINING_PERCENTAGE: 46 %
MDC_IDC_MSMT_BATTERY_STATUS: NORMAL
MDC_IDC_MSMT_CAP_CHARGE_DTM: NORMAL
MDC_IDC_MSMT_CAP_CHARGE_TIME: 12 S
MDC_IDC_MSMT_CAP_CHARGE_TYPE: NORMAL
MDC_IDC_MSMT_LEADCHNL_RV_IMPEDANCE_VALUE: 774 OHM
MDC_IDC_PG_IMPLANT_DTM: NORMAL
MDC_IDC_PG_MFG: NORMAL
MDC_IDC_PG_MODEL: NORMAL
MDC_IDC_PG_SERIAL: NORMAL
MDC_IDC_PG_TYPE: NORMAL
MDC_IDC_SESS_CLINIC_NAME: NORMAL
MDC_IDC_SESS_DTM: NORMAL
MDC_IDC_SESS_TYPE: NORMAL
MDC_IDC_SET_BRADY_AT_MODE_SWITCH_RATE: 160 {BEATS}/MIN
MDC_IDC_SET_BRADY_LOWRATE: 60 {BEATS}/MIN
MDC_IDC_SET_BRADY_MAX_SENSOR_RATE: 120 {BEATS}/MIN
MDC_IDC_SET_BRADY_MODE: NORMAL
MDC_IDC_SET_LEADCHNL_RA_PACING_POLARITY: NORMAL
MDC_IDC_SET_LEADCHNL_RA_SENSING_ADAPTATION_MODE: NORMAL
MDC_IDC_SET_LEADCHNL_RA_SENSING_POLARITY: NORMAL
MDC_IDC_SET_LEADCHNL_RA_SENSING_SENSITIVITY: 0.15 MV
MDC_IDC_SET_LEADCHNL_RV_PACING_AMPLITUDE: 1.5 V
MDC_IDC_SET_LEADCHNL_RV_PACING_POLARITY: NORMAL
MDC_IDC_SET_LEADCHNL_RV_PACING_PULSEWIDTH: 0.4 MS
MDC_IDC_SET_LEADCHNL_RV_SENSING_ADAPTATION_MODE: NORMAL
MDC_IDC_SET_LEADCHNL_RV_SENSING_POLARITY: NORMAL
MDC_IDC_SET_LEADCHNL_RV_SENSING_SENSITIVITY: 0.6 MV
MDC_IDC_SET_ZONE_DETECTION_INTERVAL: 250 MS
MDC_IDC_SET_ZONE_DETECTION_INTERVAL: 324 MS
MDC_IDC_SET_ZONE_STATUS: NORMAL
MDC_IDC_SET_ZONE_STATUS: NORMAL
MDC_IDC_SET_ZONE_TYPE: NORMAL
MDC_IDC_SET_ZONE_TYPE: NORMAL
MDC_IDC_SET_ZONE_VENDOR_TYPE: NORMAL
MDC_IDC_SET_ZONE_VENDOR_TYPE: NORMAL
MDC_IDC_STAT_BRADY_DTM_END: NORMAL
MDC_IDC_STAT_BRADY_DTM_START: NORMAL
MDC_IDC_STAT_BRADY_RA_PERCENT_PACED: 0 %
MDC_IDC_STAT_BRADY_RV_PERCENT_PACED: 39 %
MDC_IDC_STAT_EPISODE_RECENT_COUNT: 0
MDC_IDC_STAT_EPISODE_RECENT_COUNT: 2
MDC_IDC_STAT_EPISODE_RECENT_COUNT_DTM_END: NORMAL
MDC_IDC_STAT_EPISODE_RECENT_COUNT_DTM_START: NORMAL
MDC_IDC_STAT_EPISODE_TYPE: NORMAL
MDC_IDC_STAT_EPISODE_VENDOR_TYPE: NORMAL
MDC_IDC_STAT_TACHYTHERAPY_ATP_DELIVERED_RECENT: 0
MDC_IDC_STAT_TACHYTHERAPY_ATP_DELIVERED_TOTAL: 3
MDC_IDC_STAT_TACHYTHERAPY_RECENT_DTM_END: NORMAL
MDC_IDC_STAT_TACHYTHERAPY_RECENT_DTM_START: NORMAL
MDC_IDC_STAT_TACHYTHERAPY_SHOCKS_ABORTED_RECENT: 0
MDC_IDC_STAT_TACHYTHERAPY_SHOCKS_ABORTED_TOTAL: 0
MDC_IDC_STAT_TACHYTHERAPY_SHOCKS_DELIVERED_RECENT: 0
MDC_IDC_STAT_TACHYTHERAPY_SHOCKS_DELIVERED_TOTAL: 0
MDC_IDC_STAT_TACHYTHERAPY_TOTAL_DTM_END: NORMAL
MDC_IDC_STAT_TACHYTHERAPY_TOTAL_DTM_START: NORMAL

## 2025-08-06 PROCEDURE — 93296 REM INTERROG EVL PM/IDS: CPT | Performed by: INTERNAL MEDICINE

## 2025-08-06 PROCEDURE — 93295 DEV INTERROG REMOTE 1/2/MLT: CPT | Performed by: INTERNAL MEDICINE

## 2025-08-21 ENCOUNTER — TELEPHONE (OUTPATIENT)
Dept: PHARMACY | Facility: PHYSICIAN GROUP | Age: 70
End: 2025-08-21
Payer: COMMERCIAL